# Patient Record
Sex: FEMALE | Race: WHITE | NOT HISPANIC OR LATINO | Employment: UNEMPLOYED | URBAN - METROPOLITAN AREA
[De-identification: names, ages, dates, MRNs, and addresses within clinical notes are randomized per-mention and may not be internally consistent; named-entity substitution may affect disease eponyms.]

---

## 2017-06-19 ENCOUNTER — TRANSCRIBE ORDERS (OUTPATIENT)
Dept: ADMINISTRATIVE | Facility: HOSPITAL | Age: 52
End: 2017-06-19

## 2017-06-19 DIAGNOSIS — G45.9 TIA DUE TO EMBOLISM (HCC): Primary | ICD-10-CM

## 2017-06-19 DIAGNOSIS — I74.9 TIA DUE TO EMBOLISM (HCC): Primary | ICD-10-CM

## 2017-06-21 ENCOUNTER — HOSPITAL ENCOUNTER (OUTPATIENT)
Dept: RADIOLOGY | Facility: MEDICAL CENTER | Age: 52
Discharge: HOME | End: 2017-06-21
Payer: COMMERCIAL

## 2017-06-21 DIAGNOSIS — I74.9 TIA DUE TO EMBOLISM (HCC): ICD-10-CM

## 2017-06-21 DIAGNOSIS — G45.9 TIA DUE TO EMBOLISM (HCC): ICD-10-CM

## 2017-06-21 PROCEDURE — 93880 EXTRACRANIAL BILAT STUDY: CPT

## 2017-07-24 ENCOUNTER — ALLSCRIPTS OFFICE VISIT (OUTPATIENT)
Dept: OTHER | Facility: OTHER | Age: 52
End: 2017-07-24

## 2018-01-15 VITALS — HEART RATE: 67 BPM | DIASTOLIC BLOOD PRESSURE: 80 MMHG | RESPIRATION RATE: 18 BRPM | SYSTOLIC BLOOD PRESSURE: 130 MMHG

## 2018-01-24 DIAGNOSIS — I65.23 OCCLUSION AND STENOSIS OF BILATERAL CAROTID ARTERIES: ICD-10-CM

## 2018-08-21 ENCOUNTER — HOSPITAL ENCOUNTER (OUTPATIENT)
Dept: RADIOLOGY | Facility: MEDICAL CENTER | Age: 53
Discharge: HOME | End: 2018-08-21
Payer: COMMERCIAL

## 2018-08-21 DIAGNOSIS — I65.23 OCCLUSION AND STENOSIS OF BILATERAL CAROTID ARTERIES: ICD-10-CM

## 2018-08-21 PROCEDURE — 93880 EXTRACRANIAL BILAT STUDY: CPT | Performed by: SURGERY

## 2018-08-21 PROCEDURE — 93880 EXTRACRANIAL BILAT STUDY: CPT

## 2018-11-04 ENCOUNTER — OFFICE VISIT (OUTPATIENT)
Dept: URGENT CARE | Facility: MEDICAL CENTER | Age: 53
End: 2018-11-04
Payer: COMMERCIAL

## 2018-11-04 VITALS
HEART RATE: 84 BPM | SYSTOLIC BLOOD PRESSURE: 176 MMHG | HEIGHT: 63 IN | OXYGEN SATURATION: 97 % | BODY MASS INDEX: 34.12 KG/M2 | DIASTOLIC BLOOD PRESSURE: 118 MMHG | WEIGHT: 192.6 LBS | RESPIRATION RATE: 18 BRPM | TEMPERATURE: 98 F

## 2018-11-04 DIAGNOSIS — S05.01XA ABRASION OF RIGHT CORNEA, INITIAL ENCOUNTER: Primary | ICD-10-CM

## 2018-11-04 PROCEDURE — 99213 OFFICE O/P EST LOW 20 MIN: CPT | Performed by: PHYSICIAN ASSISTANT

## 2018-11-04 RX ORDER — KETOROLAC TROMETHAMINE 5 MG/ML
1 SOLUTION OPHTHALMIC 4 TIMES DAILY
Qty: 5 ML | Refills: 0 | Status: SHIPPED | OUTPATIENT
Start: 2018-11-04 | End: 2019-10-23 | Stop reason: ALTCHOICE

## 2018-11-04 RX ORDER — HYDROCHLOROTHIAZIDE 25 MG/1
25 TABLET ORAL DAILY
COMMUNITY

## 2018-11-04 RX ORDER — OFLOXACIN 3 MG/ML
SOLUTION/ DROPS OPHTHALMIC
Qty: 5 ML | Refills: 0 | Status: SHIPPED | OUTPATIENT
Start: 2018-11-04 | End: 2019-10-23 | Stop reason: ALTCHOICE

## 2018-11-04 RX ORDER — IRBESARTAN 150 MG/1
150 TABLET ORAL EVERY OTHER DAY
COMMUNITY

## 2018-11-04 RX ORDER — LEVOTHYROXINE AND LIOTHYRONINE 76; 18 UG/1; UG/1
120 TABLET ORAL EVERY OTHER DAY
COMMUNITY
End: 2019-11-05

## 2018-11-04 NOTE — PATIENT INSTRUCTIONS
Corneal Ulcer   WHAT YOU NEED TO KNOW:   What is a corneal ulcer? A corneal ulcer is an open sore on your cornea  The cornea is the smooth, clear outer layer of your eye  A corneal ulcer is caused by bacteria that get into your eye, such as through a scratch  What increases my risk for a corneal ulcer? · Dry eyes    · Eye injury from an accident, contact lenses, or a chemical splash    · Medical conditions, such as diabetes or rheumatoid arthritis    · Incorrect use of eye medicines    · Swollen eyelids    · Recent eye surgery  What are the signs and symptoms of a corneal ulcer? The most common symptom is eye pain  You may also have the following:  · A feeling that you have something in your eye    · Round, white spots or gray haze on your eye    · Red, swollen, and watery eyes    · Red skin around your eye    · Blurred or decreased vision    · Sensitivity to bright light  How is a corneal ulcer diagnosed? Your healthcare provider will examine your eye and ask about your symptoms  You may need any of the following:  · Slit-lamp test:  A microscope is used to look into your eye and check for injury  Your healthcare provider may use dye to see your injury better  · Contact lens culture: Your healthcare provider may take a sample of your contact lens to check for bacteria  How is a corneal ulcer treated? · NSAIDs:  These medicines decrease swelling, pain, and fever  NSAIDs are available without a doctor's order  Ask your healthcare provider which medicine is right for you  Ask how much to take and when to take it  Take as directed  NSAIDs can cause stomach bleeding and kidney problems if not taken correctly  · Antibiotic eye medicine: This is given to treat an infection caused by bacteria  It may be in eyedrops or an ointment  · Cycloplegic eye medicine: This will dilate your pupil and relax your eye muscles, which will decrease your pain  · Pain medicines:   You may be given prescription medicine to take away or decrease pain  Do not wait until the pain is severe before you take your medicine  What are the risks of a corneal ulcer? Your condition may return or worsen after treatment  The bacteria may spread deeper into your eye and cause tissue damage or scarring  Without treatment, you could lose your vision  How can I manage my symptoms? · Apply a warm compress:  Wet a washcloth with warm water and place it on your eye  This will help decrease swelling and pain  Use as often as directed  · Clean around your eye:  Gently remove any crusty buildup around your eye  · Use eyedrops: This will keep your eyes moist and help decrease pain  · Use safety equipment:  Wear sunglasses or safety goggles to avoid another injury  · Ask about your contacts:  Do not wear contact lenses until your healthcare provider says it is okay  Always clean your contact lenses with proper contact   When should I contact my healthcare provider? · Your vision gets worse  · Your symptoms do not improve with treatment  · You have questions or concerns about your condition or care  When should I seek immediate care or call 911? · You have severe eye pain  · You lose your vision  · You think your corneal ulcer is getting bigger  · You injure your eye again  CARE AGREEMENT:   You have the right to help plan your care  Learn about your health condition and how it may be treated  Discuss treatment options with your caregivers to decide what care you want to receive  You always have the right to refuse treatment  The above information is an  only  It is not intended as medical advice for individual conditions or treatments  Talk to your doctor, nurse or pharmacist before following any medical regimen to see if it is safe and effective for you    © 2017 [employer] Information is for End User's use only and may not be sold, redistributed or otherwise used for commercial purposes  All illustrations and images included in CareNotes® are the copyrighted property of A D A M , Inc  or Alfie Flores

## 2018-11-04 NOTE — PROGRESS NOTES
[facility]      NAME: Keaton Diamond is a 48 y o  female  : 1965    MRN: [id number]  DATE: 2018  TIME: 4:10 PM    Assessment and Plan   Abrasion of right cornea, initial encounter [S05 01XA]  1  Abrasion of right cornea, initial encounter  ofloxacin (OCUFLOX) 0 3 % ophthalmic solution    ketorolac (ACULAR) 0 5 % ophthalmic solution       Patient Instructions      I am concerned for possible corneal ulceration, due to the size the region gave patient OFloxin and Acular  Patient will follow up Ophthalmology tomorrow morning she will call for appointment  Advised if symptoms worsen, she is unable to get ophthalmology to go to the emergency department without delay  Chief Complaint     Chief Complaint   Patient presents with    Eye Pain         History of Present Illness   Keaton Diamond presents to the clinic c/o      35-year-old female, presents for evaluation of right eye pain, redness started today when she 1st toe  Patient states she has been having clear drainage coming from that eye as well she states that she feels is more irritation compared to pain  She denies any photophobia  She denies any new visual disturbances or visual loss  Denies any pain with extraocular movements  She does not wear contact lenses  Review of Systems   Review of Systems   Constitutional: Negative for fever  Eyes: Positive for pain and redness  Negative for photophobia and itching           Current Medications     Long-Term Prescriptions   Medication Sig Dispense Refill    aspirin 81 MG tablet Take 1 tablet by mouth daily      hydrochlorothiazide (HYDRODIURIL) 25 mg tablet Take 25 mg by mouth daily      irbesartan (AVAPRO) 150 mg tablet Take 150 mg by mouth daily at bedtime      ketorolac (ACULAR) 0 5 % ophthalmic solution Administer 1 drop to the right eye 4 (four) times a day 5 mL 0    thyroid (ARMOUR THYROID) 120 MG tablet Take by mouth         Current Allergies     Allergies as of 11/04/2018    (No Known Allergies)            The following portions of the patient's history were reviewed and updated as appropriate: allergies, current medications, past family history, past medical history, past social history, past surgical history and problem list     HISTORICAL INFO:  Past Medical History:   Diagnosis Date    Hypertension      No past surgical history on file  Objective   BP (!) 176/118   Pulse 84   Temp 98 °F (36 7 °C) (Temporal)   Resp 18   Ht 5' 3" (1 6 m)   Wt 87 4 kg (192 lb 9 6 oz)   SpO2 97%   BMI 34 12 kg/m²        Physical Exam     Physical Exam   Constitutional: She appears well-developed and well-nourished  No distress  HENT:   Head: Normocephalic and atraumatic  Eyes: Pupils are equal, round, and reactive to light  EOM are normal  Right conjunctiva is injected  Left conjunctiva is not injected  Fluorescein stain, with proparacaine drops revealed a triangular shaped abrasion at approximately 7 o'clock on cornea  due to the size, concerning for corneal ulcer  Cardiovascular: Normal rate, regular rhythm and normal heart sounds  Pulmonary/Chest: Effort normal and breath sounds normal  No respiratory distress  She has no wheezes  She has no rales  Skin: Skin is warm and dry  She is not diaphoretic  Nursing note and vitals reviewed  M*Modal software was used to dictate this note  It may contain errors with dictating incorrect words/spelling  Please contact provider directly for any questions

## 2019-02-26 DIAGNOSIS — I65.23 BILATERAL CAROTID ARTERY STENOSIS: Primary | ICD-10-CM

## 2019-03-22 ENCOUNTER — TELEPHONE (OUTPATIENT)
Dept: ADMINISTRATIVE | Facility: HOSPITAL | Age: 54
End: 2019-03-22

## 2019-04-01 ENCOUNTER — HOSPITAL ENCOUNTER (OUTPATIENT)
Dept: NON INVASIVE DIAGNOSTICS | Facility: CLINIC | Age: 54
Discharge: HOME | End: 2019-04-01
Payer: COMMERCIAL

## 2019-04-01 DIAGNOSIS — I65.23 BILATERAL CAROTID ARTERY STENOSIS: ICD-10-CM

## 2019-04-01 PROCEDURE — 93880 EXTRACRANIAL BILAT STUDY: CPT | Performed by: SURGERY

## 2019-04-01 PROCEDURE — 93880 EXTRACRANIAL BILAT STUDY: CPT

## 2019-10-23 ENCOUNTER — OFFICE VISIT (OUTPATIENT)
Dept: FAMILY MEDICINE CLINIC | Facility: MEDICAL CENTER | Age: 54
End: 2019-10-23
Payer: COMMERCIAL

## 2019-10-23 VITALS
RESPIRATION RATE: 16 BRPM | DIASTOLIC BLOOD PRESSURE: 60 MMHG | SYSTOLIC BLOOD PRESSURE: 108 MMHG | WEIGHT: 193 LBS | HEART RATE: 78 BPM | BODY MASS INDEX: 34.2 KG/M2 | HEIGHT: 63 IN

## 2019-10-23 DIAGNOSIS — Z12.39 SCREENING FOR BREAST CANCER: ICD-10-CM

## 2019-10-23 DIAGNOSIS — Z12.11 SCREENING FOR COLON CANCER: ICD-10-CM

## 2019-10-23 DIAGNOSIS — E78.5 HYPERLIPIDEMIA, UNSPECIFIED HYPERLIPIDEMIA TYPE: ICD-10-CM

## 2019-10-23 DIAGNOSIS — I10 ESSENTIAL HYPERTENSION: Primary | ICD-10-CM

## 2019-10-23 DIAGNOSIS — Z12.4 SCREENING FOR CERVICAL CANCER: ICD-10-CM

## 2019-10-23 DIAGNOSIS — Z13.1 SCREENING FOR DIABETES MELLITUS: ICD-10-CM

## 2019-10-23 DIAGNOSIS — Z11.59 NEED FOR HEPATITIS C SCREENING TEST: ICD-10-CM

## 2019-10-23 DIAGNOSIS — E83.52 HYPERCALCEMIA: ICD-10-CM

## 2019-10-23 DIAGNOSIS — E03.9 HYPOTHYROIDISM, UNSPECIFIED TYPE: ICD-10-CM

## 2019-10-23 PROBLEM — I65.23 CAROTID STENOSIS, ASYMPTOMATIC, BILATERAL: Status: ACTIVE | Noted: 2017-07-24

## 2019-10-23 PROCEDURE — 3074F SYST BP LT 130 MM HG: CPT | Performed by: FAMILY MEDICINE

## 2019-10-23 PROCEDURE — 99204 OFFICE O/P NEW MOD 45 MIN: CPT | Performed by: FAMILY MEDICINE

## 2019-10-23 PROCEDURE — 3078F DIAST BP <80 MM HG: CPT | Performed by: FAMILY MEDICINE

## 2019-10-23 RX ORDER — ASCORBIC ACID 100 MG
TABLET,CHEWABLE ORAL
COMMUNITY
End: 2020-01-30 | Stop reason: CLARIF

## 2019-10-23 RX ORDER — LEVOTHYROXINE AND LIOTHYRONINE 57; 13.5 UG/1; UG/1
90 TABLET ORAL EVERY OTHER DAY
COMMUNITY
End: 2019-11-06 | Stop reason: SDUPTHER

## 2019-10-23 RX ORDER — IRBESARTAN 300 MG/1
300 TABLET ORAL EVERY OTHER DAY
COMMUNITY
Start: 2019-08-24 | End: 2020-09-18 | Stop reason: SDUPTHER

## 2019-10-23 NOTE — PROGRESS NOTES
Assessment/Plan:    No problem-specific Assessment & Plan notes found for this encounter  Diagnoses and all orders for this visit:    Essential hypertension  -     Comprehensive metabolic panel; Future  -     Microalbumin / creatinine urine ratio  Blood pressure well controlled in the office today  Review of previous labs show stable renal function  Recheck labs to assess stability of renal function  Continue alternating doses of Avapro  Hypothyroidism, unspecified type  -     T4, free; Future  -     TSH, 3rd generation; Future  -     Thyroid Antibodies Panel; Future  Previous labs shows low TSH  I am going to repeat thyroid labs and adjust dose of Armor Thyroid if needed  For now continue alternating doses of 120 mg and 90 mg  Hyperlipidemia, unspecified hyperlipidemia type  -     Comprehensive metabolic panel; Future  -     Lipid Panel with Direct LDL reflex; Future  Elevated on previous labs  Recheck labs to assess current levels  May need to start medication based on current lipid levels and risk score  Will follow up results and go from there  Hypercalcemia  -     Vitamin D 25 hydroxy; Future  -     PTH, intact; Future  Review of previous labs also shows hypercalcemia  Check a vitamin-D level and PTH level  Screening for cervical cancer  -     Ambulatory referral to Gynecology; Future  Referral to gynecology for cervical cancer screening  Screening for colon cancer  -     Ambulatory referral to Gastroenterology; Future  Risks and benefits of colonoscopy discussed for colon cancer screening  Pt agreeable to getting a colonoscopy and referral for gastroenterology placed  Pt instructed to call the number on the referral to schedule an appointment for a consultation with the gastroenterology office before getting the colonoscopy  Screening for breast cancer  -     Mammo screening bilateral w 3d & cad; Future  Risks and benefits of mammography discussed to screen for breast cancer  Pt agreeable to the test and order for mammogram placed  Pt instructed to call the number on the order to schedule the appointment for the test     Need for hepatitis C screening test  -     Hepatitis C antibody; Future  Risks and benefits of Hep C testing discussed to screen for Hep C infection  Pt agreeable to the test and order for Hep C ab placed  Screening for diabetes mellitus  -     Hemoglobin A1C; Future  Check A1c to screen for diabetes  Other orders  -     B Complex Vitamins (B COMPLEX 50 PO); B Complex  -     Multiple Vitamins-Minerals (MULTIVITAMIN ADULT EXTRA C PO); multivitamin  -     Ascorbic Acid (VITAMIN C) 100 MG CHEW; Vitamin C  -     irbesartan (AVAPRO) 300 mg tablet; Take 300 mg by mouth every other day Alternating with 150mg dose  -     thyroid (ARMOUR THYROID) 90 MG tablet; Take 90 mg by mouth every other day Alternating with 120mg dose      Age appropriate vaccinations highly encouraged but patient declines any vaccines at this time  BMI Counseling: Body mass index is 34 19 kg/m²  The BMI is above normal  Nutrition recommendations include decreasing overall calorie intake  Exercise recommendations include moderate aerobic physical activity for 150 minutes/week  Follow-up in six months or sooner if needed  Subjective:      Patient ID: Ele Al is a 47 y o  female  Patient presents to establish care  She has hypertension  She is currently on Avapro 300 mg and 150 mg  Alternates dosing  This helps to keep her blood pressure controlled and not too high and not too low  Tolerating medication well without any lightheadedness, dizziness or cough  She has hypothyroidism  She is on Armor thyroid 120 mg and 90 mg  Alternates dosing  Believes she is due for thyroid labs  No symptoms of hypothyroidism such as weight gain, hair loss or fatigue  She has hyperlipidemia  She is not on any medication at this time    She has been on statins in the past but does not tolerate the medication as she states it raises her CK level  Currently controls her cholesterol via lifestyle modification with healthy diet and exercise  Has not seen the gynecologist in many years  Has not had a colonoscopy  Has not had a mammogram in a few years  Patient did bring in labs for review from April 2019  The following portions of the patient's history were reviewed and updated as appropriate:   She  has a past medical history of Hypertension and Hypothyroid  She   Patient Active Problem List    Diagnosis Date Noted    HTN (hypertension) 10/23/2019    Hypothyroid 10/23/2019    Hyperlipidemia 10/23/2019    Carotid stenosis, asymptomatic, bilateral 07/24/2017     She  has a past surgical history that includes Hip surgery (Right) and Breast surgery  Her family history includes Heart disease in her mother; Lung cancer in her father; Other in her mother  She  reports that she has quit smoking  She has never used smokeless tobacco  She reports that she drinks alcohol  She reports that she does not use drugs  Current Outpatient Medications   Medication Sig Dispense Refill    Ascorbic Acid (VITAMIN C) 100 MG CHEW Vitamin C      aspirin 81 MG tablet Take 1 tablet by mouth daily      B Complex Vitamins (B COMPLEX 50 PO) B Complex      hydrochlorothiazide (HYDRODIURIL) 25 mg tablet Take 25 mg by mouth daily      irbesartan (AVAPRO) 150 mg tablet Take 150 mg by mouth every other day Alternating with 300mg dose      irbesartan (AVAPRO) 300 mg tablet Take 300 mg by mouth every other day Alternating with 150mg dose      Multiple Vitamins-Minerals (MULTIVITAMIN ADULT EXTRA C PO) multivitamin      thyroid (ARMOUR THYROID) 120 MG tablet Take 120 mg by mouth every other day Alternating with 90mg dose       thyroid (ARMOUR THYROID) 90 MG tablet Take 90 mg by mouth every other day Alternating with 120mg dose       No current facility-administered medications for this visit  Current Outpatient Medications on File Prior to Visit   Medication Sig    Ascorbic Acid (VITAMIN C) 100 MG CHEW Vitamin C    aspirin 81 MG tablet Take 1 tablet by mouth daily    B Complex Vitamins (B COMPLEX 50 PO) B Complex    hydrochlorothiazide (HYDRODIURIL) 25 mg tablet Take 25 mg by mouth daily    irbesartan (AVAPRO) 150 mg tablet Take 150 mg by mouth every other day Alternating with 300mg dose    irbesartan (AVAPRO) 300 mg tablet Take 300 mg by mouth every other day Alternating with 150mg dose    Multiple Vitamins-Minerals (MULTIVITAMIN ADULT EXTRA C PO) multivitamin    thyroid (ARMOUR THYROID) 120 MG tablet Take 120 mg by mouth every other day Alternating with 90mg dose     thyroid (ARMOUR THYROID) 90 MG tablet Take 90 mg by mouth every other day Alternating with 120mg dose    [DISCONTINUED] ketorolac (ACULAR) 0 5 % ophthalmic solution Administer 1 drop to the right eye 4 (four) times a day (Patient not taking: Reported on 10/23/2019)    [DISCONTINUED] ofloxacin (OCUFLOX) 0 3 % ophthalmic solution Apply 1 drop every 1-2 hours on day 1-3 while awake  Day 4-7 every 4 hours  (Patient not taking: Reported on 10/23/2019)     No current facility-administered medications on file prior to visit  She is allergic to latex       Review of Systems   Constitutional: Negative for fever  HENT: Negative for ear pain, rhinorrhea and sore throat  Eyes: Negative for visual disturbance  Respiratory: Negative for shortness of breath  Cardiovascular: Negative for chest pain  Gastrointestinal: Negative for abdominal pain and blood in stool  Genitourinary: Negative for dysuria and hematuria  Musculoskeletal: Negative for arthralgias and myalgias  Skin: Negative for rash  Neurological: Negative for headaches  Psychiatric/Behavioral: Negative for dysphoric mood  The patient is not nervous/anxious            Objective:      /60 (BP Location: Left arm, Patient Position: Sitting, Cuff Size: Adult)   Pulse 78   Resp 16   Ht 5' 3" (1 6 m)   Wt 87 5 kg (193 lb)   BMI 34 19 kg/m²          Physical Exam   Constitutional: She is oriented to person, place, and time  Vital signs are normal  She appears well-developed and well-nourished  HENT:   Head: Normocephalic and atraumatic  Right Ear: Tympanic membrane, external ear and ear canal normal    Left Ear: Tympanic membrane, external ear and ear canal normal    Nose: Nose normal    Mouth/Throat: Uvula is midline, oropharynx is clear and moist and mucous membranes are normal    Eyes: Pupils are equal, round, and reactive to light  Conjunctivae, EOM and lids are normal    Neck: Trachea normal  Neck supple  No thyromegaly present  Cardiovascular: Normal rate, regular rhythm, S1 normal and S2 normal    No murmur heard  Pulmonary/Chest: Effort normal and breath sounds normal    Abdominal: Soft  Bowel sounds are normal  There is no tenderness  Lymphadenopathy:     She has no cervical adenopathy  Neurological: She is alert and oriented to person, place, and time  No cranial nerve deficit  Skin: Skin is warm, dry and intact  Psychiatric: She has a normal mood and affect   Her speech is normal and behavior is normal  Thought content normal

## 2019-10-25 ENCOUNTER — OFFICE VISIT (OUTPATIENT)
Dept: OBGYN CLINIC | Facility: MEDICAL CENTER | Age: 54
End: 2019-10-25
Payer: COMMERCIAL

## 2019-10-25 VITALS
BODY MASS INDEX: 32.6 KG/M2 | WEIGHT: 184 LBS | HEIGHT: 63 IN | DIASTOLIC BLOOD PRESSURE: 56 MMHG | SYSTOLIC BLOOD PRESSURE: 110 MMHG

## 2019-10-25 DIAGNOSIS — N90.89 DISCOLORATION OF VULVA: ICD-10-CM

## 2019-10-25 DIAGNOSIS — Z01.419 ENCOUNTER FOR GYNECOLOGICAL EXAMINATION WITHOUT ABNORMAL FINDING: ICD-10-CM

## 2019-10-25 DIAGNOSIS — Z12.39 ENCOUNTER FOR SCREENING FOR MALIGNANT NEOPLASM OF BREAST: Primary | ICD-10-CM

## 2019-10-25 DIAGNOSIS — Z12.4 SCREENING FOR CERVICAL CANCER: ICD-10-CM

## 2019-10-25 PROCEDURE — G0143 SCR C/V CYTO,THINLAYER,RESCR: HCPCS | Performed by: STUDENT IN AN ORGANIZED HEALTH CARE EDUCATION/TRAINING PROGRAM

## 2019-10-25 PROCEDURE — S0610 ANNUAL GYNECOLOGICAL EXAMINA: HCPCS | Performed by: STUDENT IN AN ORGANIZED HEALTH CARE EDUCATION/TRAINING PROGRAM

## 2019-10-25 PROCEDURE — 87624 HPV HI-RISK TYP POOLED RSLT: CPT | Performed by: STUDENT IN AN ORGANIZED HEALTH CARE EDUCATION/TRAINING PROGRAM

## 2019-10-25 PROCEDURE — 88305 TISSUE EXAM BY PATHOLOGIST: CPT | Performed by: PATHOLOGY

## 2019-10-25 PROCEDURE — 56605 BIOPSY OF VULVA/PERINEUM: CPT | Performed by: STUDENT IN AN ORGANIZED HEALTH CARE EDUCATION/TRAINING PROGRAM

## 2019-10-25 NOTE — PROGRESS NOTES
Assessment/Plan:    No problem-specific Assessment & Plan notes found for this encounter  {Assess/PlanSmartLinks:40402}      Subjective:      Patient ID: Leta Turner is a 47 y o  female      HPI    {Common ambulatory SmartLinks:23897}    Review of Systems      Objective:      /56 (BP Location: Right arm, Patient Position: Sitting, Cuff Size: Standard)   Ht 5' 3" (1 6 m)   Wt 83 5 kg (184 lb)   BMI 32 59 kg/m²          Physical Exam

## 2019-10-25 NOTE — PROGRESS NOTES
Assessment/Plan:    46 yo F here for annual exam     Follow up results of pap smear and vulvar biopsy  Pt given post procedure bleeding precautions  Instructed to place pressure on area  Subjective:      Patient ID: Matt Landis is a 47 y o  female  46 yo G0 postmenopausal female presents for annual exam  She has no complaints  She has not been to a gyn in several years  Denies postmenopausal bleeding or discharge  No pelvic pain  Did have hot flashes and night sweats years ago but these have stopped  Pt and her  have not been sexually active for years  Pt does have sexual desire and is unsure why they have not been sexually active  Denies issues with their marriage -reports her  is her best friend and they are very happy  Last pap smear: unknown  Denies any abnormal pap smear  Mammo: ordered for next week  Colonoscopy: never  Given referral from PCP  Pt and her  were  on Halloween in full costumes  The following portions of the patient's history were reviewed and updated as appropriate: allergies, current medications, past family history, past medical history, past social history, past surgical history and problem list     Review of Systems   Constitutional: Negative  HENT: Negative  Eyes: Negative  Respiratory: Negative  Cardiovascular: Negative  Gastrointestinal: Negative  Endocrine: Negative  Genitourinary: Negative for dyspareunia, dysuria, frequency, menstrual problem, pelvic pain, vaginal discharge and vaginal pain  Musculoskeletal: Negative  Skin: Negative  Allergic/Immunologic: Negative  Neurological: Negative  Hematological: Negative  Psychiatric/Behavioral: Negative  Objective:      /56 (BP Location: Right arm, Patient Position: Sitting, Cuff Size: Standard)   Ht 5' 3" (1 6 m)   Wt 83 5 kg (184 lb)   BMI 32 59 kg/m²          Physical Exam   Constitutional: She is oriented to person, place, and time  She appears well-nourished  Neck: Normal range of motion  Cardiovascular: Normal rate  Pulmonary/Chest: Effort normal  Right breast exhibits no mass, no nipple discharge, no skin change and no tenderness  Left breast exhibits no mass, no nipple discharge, no skin change and no tenderness  Breasts are symmetrical    Abdominal: Soft  Genitourinary: Vagina normal and uterus normal  There is lesion on the left labia  Uterus is not enlarged and not fixed  Right adnexum displays no mass  Left adnexum displays no mass  No signs of injury around the vagina  Genitourinary Comments: 2 cm light brown discoloration of labia minora  Per pt it has not been pruritic or painful  Neurological: She is alert and oriented to person, place, and time  Skin: Skin is warm and dry  Psychiatric: She has a normal mood and affect  Vitals reviewed  Biopsy  Date/Time: 10/25/2019 3:10 PM  Performed by: Srini Barry MD  Authorized by: Srini Barry MD     Procedure Details - Skin Biopsy:     Biopsy tissue type: mucous membrane    Biopsy method: punch biopsy      Initial size (mm):  6     Area cleansed with betadine  Injected local anesthetic  6 mm punch biopsy taken  Amputated with scissors  Pressure placed  Silver nitrate applied and hemostasis noted

## 2019-10-28 DIAGNOSIS — I65.23 CAROTID STENOSIS, BILATERAL: Primary | ICD-10-CM

## 2019-10-28 LAB
HPV HR 12 DNA CVX QL NAA+PROBE: NEGATIVE
HPV16 DNA CVX QL NAA+PROBE: NEGATIVE
HPV18 DNA CVX QL NAA+PROBE: NEGATIVE

## 2019-10-29 ENCOUNTER — TELEPHONE (OUTPATIENT)
Dept: ADMINISTRATIVE | Facility: HOSPITAL | Age: 54
End: 2019-10-29

## 2019-10-29 LAB
CREAT ?TM UR-SCNC: 141 UMOL/L
EXT MICROALBUMIN URINE RANDOM: 1
HBA1C MFR BLD HPLC: 5.8 %
HCV AB SER-ACNC: NEGATIVE
MICROALBUMIN/CREAT UR: 7.1 MG/G{CREAT}

## 2019-10-31 LAB
LAB AP GYN PRIMARY INTERPRETATION: NORMAL
Lab: NORMAL

## 2019-11-04 ENCOUNTER — TELEPHONE (OUTPATIENT)
Dept: GASTROENTEROLOGY | Facility: AMBULARY SURGERY CENTER | Age: 54
End: 2019-11-04

## 2019-11-04 ENCOUNTER — TELEPHONE (OUTPATIENT)
Dept: OBGYN CLINIC | Facility: CLINIC | Age: 54
End: 2019-11-04

## 2019-11-04 NOTE — TELEPHONE ENCOUNTER
Lm for pt to call back re: scheduling screening colonoscopy w/ dr Isaias Davila at [facility] per referral

## 2019-11-04 NOTE — TELEPHONE ENCOUNTER
----- Message from Christoph Bean MD sent at 11/4/2019 10:49 AM EST -----  Discussed results with pt  Biopsy and pap WNL  Can you please give her a call and set up an annual for October 2020? She is expecting your call  Thanks!

## 2019-11-05 ENCOUNTER — HOSPITAL ENCOUNTER (OUTPATIENT)
Dept: RADIOLOGY | Facility: MEDICAL CENTER | Age: 54
Discharge: HOME | End: 2019-11-05
Payer: COMMERCIAL

## 2019-11-05 ENCOUNTER — TELEPHONE (OUTPATIENT)
Dept: FAMILY MEDICINE CLINIC | Facility: MEDICAL CENTER | Age: 54
End: 2019-11-05

## 2019-11-05 VITALS — WEIGHT: 184 LBS | HEIGHT: 63 IN | BODY MASS INDEX: 32.6 KG/M2

## 2019-11-05 DIAGNOSIS — E03.9 HYPOTHYROIDISM, UNSPECIFIED TYPE: Primary | ICD-10-CM

## 2019-11-05 DIAGNOSIS — E55.9 VITAMIN D DEFICIENCY: ICD-10-CM

## 2019-11-05 DIAGNOSIS — Z12.39 SCREENING FOR BREAST CANCER: ICD-10-CM

## 2019-11-05 PROCEDURE — 77067 SCR MAMMO BI INCL CAD: CPT

## 2019-11-05 PROCEDURE — 77063 BREAST TOMOSYNTHESIS BI: CPT

## 2019-11-05 NOTE — TELEPHONE ENCOUNTER
Aware- needs [facility] as the Vitamin D rx/ will  orders for blood work this afternoon

## 2019-11-05 NOTE — TELEPHONE ENCOUNTER
----- Message from Gayla Blair DO sent at 11/5/2019 12:32 PM EST -----  Labs reviewed  Patient is getting too much thyroid medication has her TSH is very low  She is currently on Armor Thyroid 120 mg and 90 mg which she alternates  She is to discontinue the 120 mg tablets and stick with the 90 mg tablets  Repeat thyroid labs in six weeks  Order for that has been placed  Vitamin-D is low likely causing hypercalcemia  I recommend vitamin-D 85648 units weekly for at least 3-6 months  If patient is agreeable I will fax in the medication  We will then repeat vitamin-D labs at a later date  Remainder of labs unremarkable

## 2019-11-06 PROBLEM — E55.9 VITAMIN D DEFICIENCY: Status: ACTIVE | Noted: 2019-11-06

## 2019-11-06 RX ORDER — LEVOTHYROXINE AND LIOTHYRONINE 57; 13.5 UG/1; UG/1
90 TABLET ORAL DAILY
Qty: 90 TABLET | Refills: 0 | Status: SHIPPED | OUTPATIENT
Start: 2019-11-06 | End: 2019-11-11

## 2019-11-06 RX ORDER — ERGOCALCIFEROL 1.25 MG/1
50000 CAPSULE ORAL WEEKLY
Qty: 12 CAPSULE | Refills: 1 | Status: SHIPPED | OUTPATIENT
Start: 2019-11-06 | End: 2020-01-30 | Stop reason: ALTCHOICE

## 2019-11-06 NOTE — TELEPHONE ENCOUNTER
Vitamin-D and Wander Thyroid both [facility]ed [facility]  Thyroid labs ordered yesterday  That will be for six weeks from yesterday  Vitamin-D labs ordered which will be due in six months

## 2019-11-07 ENCOUNTER — TELEPHONE (OUTPATIENT)
Dept: FAMILY MEDICINE CLINIC | Facility: MEDICAL CENTER | Age: 54
End: 2019-11-07

## 2019-11-07 NOTE — TELEPHONE ENCOUNTER
----- Message from Emerita Lerner DO sent at 11/7/2019  1:02 PM EST -----  Mammogram negative  Repeat in one year

## 2019-11-11 ENCOUNTER — TELEPHONE (OUTPATIENT)
Dept: FAMILY MEDICINE CLINIC | Facility: MEDICAL CENTER | Age: 54
End: 2019-11-11

## 2019-11-11 DIAGNOSIS — E03.9 HYPOTHYROIDISM, UNSPECIFIED TYPE: ICD-10-CM

## 2019-11-11 RX ORDER — LEVOTHYROXINE AND LIOTHYRONINE 57; 13.5 UG/1; UG/1
90 TABLET ORAL DAILY
Qty: 90 TABLET | Refills: 0 | Status: SHIPPED | OUTPATIENT
Start: 2019-11-11 | End: 2019-11-12 | Stop reason: SDUPTHER

## 2019-11-11 NOTE — TELEPHONE ENCOUNTER
Pt is confused by her thyroid med dose  She thought she was to just be taking 90 mg, but when she picked up the rx it has her alternating with the 120 mg dose  Pt wants to confirm correct dose  I did see in the 11/5 phone msg she was to discontinue the 120 mg, but her 90 mg rx says differently    I wanted to check with you first

## 2019-11-11 NOTE — TELEPHONE ENCOUNTER
Aware, she received RX from her mail order for incorrect amount  New RX requested to go to correct pharmacy

## 2019-11-11 NOTE — TELEPHONE ENCOUNTER
Please resend her Switzer Thyroid RX to her mail order pharmacy for the 90 day supply of 90 mg  Daily  You sent it to the [facility], I called them and canceled that RX

## 2019-11-12 RX ORDER — LEVOTHYROXINE AND LIOTHYRONINE 57; 13.5 UG/1; UG/1
90 TABLET ORAL DAILY
Qty: 90 TABLET | Refills: 0 | Status: SHIPPED | OUTPATIENT
Start: 2019-11-12 | End: 2020-02-26

## 2019-11-13 ENCOUNTER — OFFICE VISIT (OUTPATIENT)
Dept: URGENT CARE | Facility: MEDICAL CENTER | Age: 54
End: 2019-11-13
Payer: COMMERCIAL

## 2019-11-13 VITALS
BODY MASS INDEX: 32.43 KG/M2 | HEART RATE: 89 BPM | RESPIRATION RATE: 20 BRPM | WEIGHT: 183 LBS | TEMPERATURE: 97.6 F | HEIGHT: 63 IN | OXYGEN SATURATION: 100 %

## 2019-11-13 DIAGNOSIS — M54.41 ACUTE RIGHT-SIDED LOW BACK PAIN WITH RIGHT-SIDED SCIATICA: Primary | ICD-10-CM

## 2019-11-13 PROCEDURE — 99213 OFFICE O/P EST LOW 20 MIN: CPT | Performed by: PHYSICIAN ASSISTANT

## 2019-11-13 RX ORDER — CYCLOBENZAPRINE HCL 10 MG
10 TABLET ORAL 3 TIMES DAILY PRN
Qty: 30 TABLET | Refills: 0 | Status: SHIPPED | OUTPATIENT
Start: 2019-11-13 | End: 2020-01-30 | Stop reason: ALTCHOICE

## 2019-11-13 RX ORDER — PREDNISONE 20 MG/1
40 TABLET ORAL DAILY
Qty: 10 TABLET | Refills: 0 | Status: SHIPPED | OUTPATIENT
Start: 2019-11-13 | End: 2019-11-18

## 2019-11-14 NOTE — PROGRESS NOTES
[facility]        NAME: Anais Devries is a 47 y o  female  : 1965    MRN: [id number]  DATE: 2019  TIME: 7:33 PM    Assessment and Plan   Acute right-sided low back pain with right-sided sciatica [M54 41]  1  Acute right-sided low back pain with right-sided sciatica  cyclobenzaprine (FLEXERIL) 10 mg tablet    predniSONE 20 mg tablet         Patient Instructions     May continue Tylenol   use moist heat on back   use prednisone and Flexeril as directed   follow-up with PCP if symptoms do not improve    Chief Complaint     Chief Complaint   Patient presents with    Back Pain     x3 as taken tylenol  pt states it barely works  very bad back spasms  History of Present Illness         Patient is a 51-year-old female who presents today with complaints of right-sided lower back pain that started over the past 3 days  Denies any known injury  Patient did have a right hip replacement back in May 2019, she has been in physical therapy for this  She reports her right leg was shorter than her left until her hip was replaced  She does report pain and some numbness and tingling down the right leg as well  She has tried Tylenol Motrin with no relief  Certain movements make the pain worse  She denies any fevers, abdominal pain, bowel or bladder incontinence  Review of Systems   Review of Systems   Constitutional: Negative for fever  Respiratory: Negative for shortness of breath  Cardiovascular: Negative for chest pain  Gastrointestinal: Negative for abdominal pain  Genitourinary: Negative for difficulty urinating, dysuria and hematuria  Musculoskeletal: Positive for back pain  Skin: Negative for rash  Neurological: Positive for numbness           Current Medications       Current Outpatient Medications:     Ascorbic Acid (VITAMIN C) 100 MG CHEW, Vitamin C, Disp: , Rfl:     aspirin 81 MG tablet, Take 1 tablet by mouth daily, Disp: , Rfl:     B Complex Vitamins (B COMPLEX 50 PO), B Complex, Disp: , Rfl:     ergocalciferol (VITAMIN D2) 50,000 units, Take 1 capsule (50,000 Units total) by mouth once a week, Disp: 12 capsule, Rfl: 1    hydrochlorothiazide (HYDRODIURIL) 25 mg tablet, Take 25 mg by mouth daily, Disp: , Rfl:     irbesartan (AVAPRO) 150 mg tablet, Take 150 mg by mouth every other day Alternating with 300mg dose, Disp: , Rfl:     irbesartan (AVAPRO) 300 mg tablet, Take 300 mg by mouth every other day Alternating with 150mg dose, Disp: , Rfl:     Multiple Vitamins-Minerals (MULTIVITAMIN ADULT EXTRA C PO), multivitamin, Disp: , Rfl:     thyroid (ARMOUR THYROID) 90 MG tablet, Take 1 tablet (90 mg total) by mouth daily, Disp: 90 tablet, Rfl: 0    cyclobenzaprine (FLEXERIL) 10 mg tablet, Take 1 tablet (10 mg total) by mouth 3 (three) times a day as needed for muscle spasms, Disp: 30 tablet, Rfl: 0    predniSONE 20 mg tablet, Take 2 tablets (40 mg total) by mouth daily for 5 days, Disp: 10 tablet, Rfl: 0    Current Allergies     Allergies as of 11/13/2019 - Reviewed 11/13/2019   Allergen Reaction Noted    Latex  10/23/2019            The following portions of the patient's history were reviewed and updated as appropriate: allergies, current medications, past family history, past medical history, past social history, past surgical history and problem list      Past Medical History:   Diagnosis Date    Arthritis     Hyperlipidemia     Hypertension     Hypothyroid        Past Surgical History:   Procedure Laterality Date    BREAST CYST EXCISION Left 2001    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    BREAST LUMPECTOMY Left 04/2001    DCIS    BREAST SURGERY      HIP SURGERY Right     1966, 1975, 1976        Family History   Problem Relation Age of Onset    Other Mother         Carotid stenosis, bilateral     Heart disease Mother     Hypertension Mother     Lung cancer Father         smoker    Liver cancer Father    Keysha Slipper Hypertension Father     Hypertension Brother     Heart attack Maternal Grandmother     Thyroid disease Maternal Grandmother     No Known Problems Sister     No Known Problems Maternal Grandfather     No Known Problems Paternal Grandmother     No Known Problems Paternal Grandfather     No Known Problems Sister     No Known Problems Brother     No Known Problems Maternal Aunt     No Known Problems Maternal Aunt          Medications have been verified  Objective   Pulse 89   Temp 97 6 °F (36 4 °C) (Temporal)   Resp 20   Ht 5' 3" (1 6 m)   Wt 83 kg (183 lb)   SpO2 100%   BMI 32 42 kg/m²        Physical Exam     Physical Exam   Constitutional: She appears well-developed and well-nourished  No distress  HENT:   Head: Normocephalic and atraumatic  Cardiovascular: Normal rate and regular rhythm  Pulmonary/Chest: Effort normal and breath sounds normal    Abdominal: Soft  Bowel sounds are normal  She exhibits no distension  There is no tenderness  Musculoskeletal:        Lumbar back: She exhibits decreased range of motion, tenderness, pain and spasm  She exhibits no bony tenderness, no swelling, no edema, no deformity, no laceration and normal pulse  Back:    Skin: Skin is warm and dry  No rash noted

## 2019-11-14 NOTE — PATIENT INSTRUCTIONS
May continue Tylenol   use moist heat on back   use prednisone and Flexeril as directed   follow-up with PCP if symptoms do not improve    Back Pain   WHAT YOU NEED TO KNOW:   Back pain is common  It can be caused by many conditions, such as arthritis or the breakdown of spinal discs  Your risk for back pain is increased by injuries, lack of activity, or repeated bending and twisting  You may feel sore or stiff on one or both sides of your back  The pain may spread to your buttocks or thighs  DISCHARGE INSTRUCTIONS:   Medicines:   · NSAIDs  help decrease swelling and pain  This medicine is available with or without a doctor's order  NSAIDs can cause stomach bleeding or kidney problems in certain people  If you take blood thinner medicine, always ask your healthcare provider if NSAIDs are safe for you  Always read the medicine label and follow directions  · Acetaminophen  decreases pain  It is available without a doctor's order  Ask how much to take and how often to take it  Follow directions  Acetaminophen can cause liver damage if not taken correctly  · Prescription pain medicine  may be given  Ask your healthcare provider how to take this medicine safely  · Take your medicine as directed  Contact your healthcare provider if you think your medicine is not helping or if you have side effects  Tell him or her if you are allergic to any medicine  Keep a list of the medicines, vitamins, and herbs you take  Include the amounts, and when and why you take them  Bring the list or the pill bottles to follow-up visits  Carry your medicine list with you in case of an emergency  Follow up with your healthcare provider in 2 weeks, or as directed:  Write down your questions so you remember to ask them during your visits  How to manage your back pain:   · Apply ice  on your back or affected area for 15 to 20 minutes every hour or as directed  Use an ice pack, or put crushed ice in a plastic bag   Cover it with a towel  Ice helps prevent tissue damage and decreases pain  · Apply heat  on your back or affected area for 20 to 30 minutes every 2 hours for as many days as directed  Heat helps decrease pain and muscle spasms  · Stay active  as much as you can without causing more pain  Bed rest could make your back pain worse  Avoid heavy lifting until your pain is gone  Return to the emergency department if:   · You have pain, numbness, or weakness in one or both legs  · Your pain becomes so severe that you cannot walk  · You cannot control your urine or bowel movements  · You have severe back pain with chest pain  · You have severe back pain, nausea, and vomiting  · You have severe back pain that spreads to your side or genital area  Contact your healthcare provider if:   · You have back pain that does not get better with rest and pain medicine  · You have a fever  · You have pain that worsens when you are on your back or when you rest     · You have pain that worsens when you cough or sneeze  · You lose weight without trying  · You have questions or concerns about your condition or care  © 2017 [facility] Information is for End User's use only and may not be sold, redistributed or otherwise used for commercial purposes  All illustrations and images included in CareNotes® are the copyrighted property of A D [employer] M [employer]  or Alfie Flores  The above information is an  only  It is not intended as medical advice for individual conditions or treatments  Talk to your doctor, nurse or pharmacist before following any medical regimen to see if it is safe and effective for you

## 2019-11-15 ENCOUNTER — HOSPITAL ENCOUNTER (OUTPATIENT)
Dept: NON INVASIVE DIAGNOSTICS | Facility: CLINIC | Age: 54
Discharge: HOME | End: 2019-11-15
Payer: COMMERCIAL

## 2019-11-15 DIAGNOSIS — I65.23 CAROTID STENOSIS, BILATERAL: ICD-10-CM

## 2019-11-15 PROCEDURE — 93880 EXTRACRANIAL BILAT STUDY: CPT

## 2019-11-19 PROCEDURE — 93880 EXTRACRANIAL BILAT STUDY: CPT | Performed by: SURGERY

## 2020-01-02 ENCOUNTER — TELEPHONE (OUTPATIENT)
Dept: FAMILY MEDICINE CLINIC | Facility: MEDICAL CENTER | Age: 55
End: 2020-01-02

## 2020-01-02 DIAGNOSIS — E03.9 HYPOTHYROIDISM, UNSPECIFIED TYPE: Primary | ICD-10-CM

## 2020-01-02 NOTE — TELEPHONE ENCOUNTER
----- Message from Christopher Javier, DO sent at 1/2/2020  8:09 AM EST -----  Thyroid labs do show some abnormality  Continue current dose of Armor Thyroid  Repeat thyroid labs in six weeks  Will determine at that time if medication adjustment needs to be made or if endocrinology referral is needed

## 2020-01-02 NOTE — TELEPHONE ENCOUNTER
Pt aware, agreeable to labs  Please place order and we will mail to her home address as she uses [facility]

## 2020-01-15 NOTE — TELEPHONE ENCOUNTER
Patients GI provider:  Dr Cooper Payne to return call: [phone]    Reason for call: Pt calling to schedule her repeat procedure    Scheduled procedure/appointment date if applicable: Apt/procedure

## 2020-01-16 ENCOUNTER — TELEPHONE (OUTPATIENT)
Dept: GASTROENTEROLOGY | Facility: AMBULARY SURGERY CENTER | Age: 55
End: 2020-01-16

## 2020-01-16 DIAGNOSIS — Z12.11 SCREEN FOR COLON CANCER: Primary | ICD-10-CM

## 2020-01-16 NOTE — TELEPHONE ENCOUNTER
Spoke to pt   Scheduled for colonoscopy w/ dr Frannie Gamez at an gi lab on 1/30/2020/suprep ordered/instructions mailed

## 2020-01-30 ENCOUNTER — HOSPITAL ENCOUNTER (OUTPATIENT)
Dept: GASTROENTEROLOGY | Facility: HOSPITAL | Age: 55
Setting detail: OUTPATIENT SURGERY
Discharge: HOME | End: 2020-01-30
Attending: INTERNAL MEDICINE
Payer: COMMERCIAL

## 2020-01-30 ENCOUNTER — ANESTHESIA EVENT (OUTPATIENT)
Dept: GASTROENTEROLOGY | Facility: HOSPITAL | Age: 55
End: 2020-01-30

## 2020-01-30 ENCOUNTER — ANESTHESIA (OUTPATIENT)
Dept: GASTROENTEROLOGY | Facility: HOSPITAL | Age: 55
End: 2020-01-30

## 2020-01-30 VITALS
RESPIRATION RATE: 18 BRPM | WEIGHT: 182 LBS | TEMPERATURE: 96.8 F | OXYGEN SATURATION: 98 % | BODY MASS INDEX: 32.25 KG/M2 | HEIGHT: 63 IN | HEART RATE: 77 BPM | DIASTOLIC BLOOD PRESSURE: 95 MMHG | SYSTOLIC BLOOD PRESSURE: 156 MMHG

## 2020-01-30 DIAGNOSIS — Z12.11 SCREENING FOR COLON CANCER: ICD-10-CM

## 2020-01-30 PROCEDURE — G0121 COLON CA SCRN NOT HI RSK IND: HCPCS | Performed by: INTERNAL MEDICINE

## 2020-01-30 RX ORDER — PROPOFOL 10 MG/ML
INJECTION, EMULSION INTRAVENOUS AS NEEDED
Status: DISCONTINUED | OUTPATIENT
Start: 2020-01-30 | End: 2020-01-30 | Stop reason: SURG

## 2020-01-30 RX ORDER — MULTIVIT WITH MINERALS/LUTEIN
1000 TABLET ORAL DAILY
COMMUNITY

## 2020-01-30 RX ORDER — LIDOCAINE HYDROCHLORIDE 10 MG/ML
INJECTION, SOLUTION EPIDURAL; INFILTRATION; INTRACAUDAL; PERINEURAL AS NEEDED
Status: DISCONTINUED | OUTPATIENT
Start: 2020-01-30 | End: 2020-01-30 | Stop reason: SURG

## 2020-01-30 RX ORDER — SODIUM CHLORIDE, SODIUM LACTATE, POTASSIUM CHLORIDE, CALCIUM CHLORIDE 600; 310; 30; 20 MG/100ML; MG/100ML; MG/100ML; MG/100ML
INJECTION, SOLUTION INTRAVENOUS CONTINUOUS PRN
Status: DISCONTINUED | OUTPATIENT
Start: 2020-01-30 | End: 2020-01-30 | Stop reason: SURG

## 2020-01-30 RX ORDER — IBUPROFEN 200 MG
800 TABLET ORAL EVERY 6 HOURS PRN
COMMUNITY

## 2020-01-30 RX ADMIN — SODIUM CHLORIDE, SODIUM LACTATE, POTASSIUM CHLORIDE, AND CALCIUM CHLORIDE: .6; .31; .03; .02 INJECTION, SOLUTION INTRAVENOUS at 10:33

## 2020-01-30 RX ADMIN — LIDOCAINE HYDROCHLORIDE 50 MG: 10 INJECTION, SOLUTION EPIDURAL; INFILTRATION; INTRACAUDAL; PERINEURAL at 10:37

## 2020-01-30 RX ADMIN — PROPOFOL 100 MG: 10 INJECTION, EMULSION INTRAVENOUS at 10:37

## 2020-01-30 RX ADMIN — PROPOFOL 50 MG: 10 INJECTION, EMULSION INTRAVENOUS at 10:46

## 2020-01-30 RX ADMIN — PROPOFOL 50 MG: 10 INJECTION, EMULSION INTRAVENOUS at 10:40

## 2020-01-30 NOTE — ANESTHESIA PREPROCEDURE EVALUATION
Review of Systems/Medical History          Cardiovascular  Hyperlipidemia, Hypertension ,   Comment: Right carotid stenosis - no symptoms, followed by vascular surgery,  Pulmonary       GI/Hepatic            Endo/Other  History of thyroid disease , hypothyroidism,      GYN       Hematology   Musculoskeletal    Arthritis     Neurology   Psychology                Anesthesia Plan  ASA Score- 2     Anesthesia Type- IV sedation with anesthesia with ASA Monitors  Additional Monitors:   Airway Plan:     Comment: IV sedation,  standard ASA monitors  Risks and benefits discussed with patient; patient consented and agrees to proceed  I saw and evaluated the patient  If seen with CRNA, we have discussed the anesthetic plan and I am in agreement that the plan is appropriate for the patient  Post-menopausal      Plan Factors-    Induction- intravenous  Postoperative Plan-     Informed Consent- Anesthetic plan and risks discussed with patient  I personally reviewed this patient with the CRNA  Discussed and agreed on the Anesthesia Plan with the CRNA  Surya Win

## 2020-02-26 DIAGNOSIS — E03.9 HYPOTHYROIDISM, UNSPECIFIED TYPE: ICD-10-CM

## 2020-02-26 RX ORDER — THYROID,PORK 90 MG
TABLET ORAL
Qty: 30 TABLET | Refills: 0 | Status: SHIPPED | OUTPATIENT
Start: 2020-02-26

## 2020-02-28 NOTE — TELEPHONE ENCOUNTER
I called patient to let her know 30 days [place] Thyroid sent in and she needs to do repeat labs  She already did the labs in December (they are in media) , she didn't think she needed to take this medication anymore   [facility] sent to   Request for [place] Thyroid to be filled  Looks like it was filled for 30 days to [facility] which they will only fill 90 anyway  Please cancel order if not needed and call  Patient with  any other instructions

## 2020-03-05 ENCOUNTER — TELEPHONE (OUTPATIENT)
Dept: FAMILY MEDICINE CLINIC | Facility: MEDICAL CENTER | Age: 55
End: 2020-03-05

## 2020-03-05 NOTE — TELEPHONE ENCOUNTER
----- Message from Elsi Ochoa DO sent at 3/5/2020 12:48 PM EST -----  Thyroid labs remain abnormal   Is patient still taking her thyroid medication?

## 2020-03-06 NOTE — TELEPHONE ENCOUNTER
She will then need to see endocrinology as I will not continue prescribing Armor Thyroid due to lab abnormalities

## 2020-03-06 NOTE — TELEPHONE ENCOUNTER
Patient declines Levothyroxine  States she does do well with synthetic thyroid medication   Please advise

## 2020-03-06 NOTE — TELEPHONE ENCOUNTER
fyi-  Pt aware- She verbalized not happy with the recommendation   States you only made one adjustment   I explained to the patient that Family practice can manage most conditions however when the condition becomes difficult or complications arise then the PCP will refer to a specialist   She will find a specialist  I told her we would put a referral order in for her for a [facility] provider  She declined at this time    Will call back if needed

## 2020-03-06 NOTE — TELEPHONE ENCOUNTER
Due to persistent abnormalities I recommend switching to levothyroxine for treatment of her hypothyroidism

## 2020-06-30 DIAGNOSIS — E03.9 HYPOTHYROIDISM, UNSPECIFIED TYPE: ICD-10-CM

## 2020-06-30 RX ORDER — LEVOTHYROXINE AND LIOTHYRONINE 57; 13.5 UG/1; UG/1
90 TABLET ORAL DAILY
Qty: 30 TABLET | Refills: 0 | OUTPATIENT
Start: 2020-06-30

## 2020-06-30 NOTE — TELEPHONE ENCOUNTER
Pt scheduled her annual physical for 8/31/20  She is requesting Rx for her thyroid med    Please send 90 day supply to [facility]

## 2020-06-30 NOTE — TELEPHONE ENCOUNTER
Per telephone message from 3/5/2020 I did state I was no longer going to keep patient on Armor Thyroid due to abnormal thyroid labs  She will need to take levothyroxine or see endocrinology  That previous message states she was going to reestablish elsewhere  I will be more than happy to fill levothyroxine and patient can have repeat labs in six weeks

## 2020-06-30 NOTE — TELEPHONE ENCOUNTER
Pt will not take the Levothyroxine  ( a synthetic ) , unhappy that you are not willing to work with her    She will find another doctor then

## 2020-08-07 DIAGNOSIS — I65.23 CAROTID STENOSIS, BILATERAL: Primary | ICD-10-CM

## 2020-09-03 ENCOUNTER — TRANSCRIBE ORDERS (OUTPATIENT)
Dept: VASCULAR SURGERY | Facility: CLINIC | Age: 55
End: 2020-09-03

## 2020-09-03 DIAGNOSIS — I65.23 BILATERAL CAROTID ARTERY STENOSIS: Primary | ICD-10-CM

## 2020-09-09 ENCOUNTER — TRANSCRIBE ORDERS (OUTPATIENT)
Dept: ADMINISTRATIVE | Facility: HOSPITAL | Age: 55
End: 2020-09-09

## 2020-09-15 ENCOUNTER — HOSPITAL ENCOUNTER (OUTPATIENT)
Dept: NON INVASIVE DIAGNOSTICS | Facility: CLINIC | Age: 55
Discharge: HOME | End: 2020-09-15
Payer: COMMERCIAL

## 2020-09-15 DIAGNOSIS — I65.23 CAROTID STENOSIS, BILATERAL: ICD-10-CM

## 2020-09-15 PROCEDURE — 93880 EXTRACRANIAL BILAT STUDY: CPT

## 2020-09-16 PROCEDURE — 93880 EXTRACRANIAL BILAT STUDY: CPT | Performed by: SURGERY

## 2020-09-17 ENCOUNTER — TELEPHONE (OUTPATIENT)
Dept: ADMINISTRATIVE | Facility: HOSPITAL | Age: 55
End: 2020-09-17

## 2020-09-18 ENCOUNTER — OFFICE VISIT (OUTPATIENT)
Dept: VASCULAR SURGERY | Facility: CLINIC | Age: 55
End: 2020-09-18
Payer: COMMERCIAL

## 2020-09-18 VITALS
BODY MASS INDEX: 32.07 KG/M2 | WEIGHT: 181 LBS | TEMPERATURE: 98.3 F | HEART RATE: 90 BPM | HEIGHT: 63 IN | SYSTOLIC BLOOD PRESSURE: 124 MMHG | DIASTOLIC BLOOD PRESSURE: 78 MMHG

## 2020-09-18 DIAGNOSIS — I65.23 CAROTID STENOSIS, ASYMPTOMATIC, BILATERAL: Primary | ICD-10-CM

## 2020-09-18 DIAGNOSIS — I65.23 BILATERAL CAROTID ARTERY STENOSIS: ICD-10-CM

## 2020-09-18 DIAGNOSIS — I10 ESSENTIAL HYPERTENSION: ICD-10-CM

## 2020-09-18 DIAGNOSIS — E78.5 HYPERLIPIDEMIA, UNSPECIFIED HYPERLIPIDEMIA TYPE: ICD-10-CM

## 2020-09-18 PROCEDURE — 1036F TOBACCO NON-USER: CPT | Performed by: PHYSICIAN ASSISTANT

## 2020-09-18 PROCEDURE — 99244 OFF/OP CNSLTJ NEW/EST MOD 40: CPT | Performed by: PHYSICIAN ASSISTANT

## 2020-09-18 RX ORDER — LEVOTHYROXINE AND LIOTHYRONINE 38; 9 UG/1; UG/1
60 TABLET ORAL DAILY
COMMUNITY
End: 2022-06-29 | Stop reason: ALTCHOICE

## 2020-09-18 NOTE — PATIENT INSTRUCTIONS
Carotid artery stenosis      Carotid du R 50-69% (199/84/2  23) L < 50% (95/33)   total cholesterol 239 triglycerides 76 HDL 71       - Continue with aspirin 81 and Avapro  - We discussed sx of stroke for which she should call 911  - Follow up carotid duplex in 6 months per surveillance protocol  - Office visit in 12 months      Symptoms of stroke:  - Unable to speak or understand speech  - Unable to move one side of the body (arm or leg)  - Visual changes  - Call 911 for any symptoms of stroke          Carotid duplex 09/15/2020  FINDINGS:     Right        Impression  PSV  EDV (cm/s)  Direction of Flow  Ratio    Dist  ICA                 87          47                      0 97    Mid  ICA                 128          44                      1 43    Prox  ICA    50 - 69%    199          84                      2 23    Dist CCA                  87          31                              Mid CCA                   89          30                      0 88    Prox CCA                 102          37                              Ext Carotid               76          15                      0 85    Prox Vert                 57          19  Antegrade                   Subclavian               121          21                                 Left         Impression  PSV  EDV (cm/s)  Direction of Flow  Ratio    Dist  ICA                 48          24                      0 38    Mid  ICA                  74          36                      0 59    Prox   ICA    1 - 49%      95          35                      0 76    Dist CCA                  86          27                              Mid CCA                  124          41                      1 29    Prox CCA                  96          40                              Ext Carotid               82          21                      0 66    Prox Vert                 69          29  Antegrade                   Subclavian                94          14 CONCLUSION:     Impression  RIGHT:  There is 50-69% stenosis noted in the internal carotid artery  Plaque is  heterogenous and irregular  Vertebral artery flow is antegrade  There is no significant subclavian artery  disease  LEFT:  There is <50% stenosis noted in the internal carotid artery  Plaque is  heterogenous and irregular  Vertebral artery flow is antegrade  There is no significant subclavian artery  disease  Compared to previous study on 11/05/2019, there is no significant progression  of disease  Recommend repeat testing in 6 months as per protocol unless otherwise  indicated

## 2020-09-18 NOTE — PROGRESS NOTES
Assessment/Plan:    Bilateral carotid artery stenosis, R > L (asymptomatic)  Hypertension  Hyperlipidemia  Obesity    53 y/o F hypertension, hyperlipidemia, obesity, hypothyroidism, carotid artery stenosis who presents for vascular evaluation  - asymptomatic from carotid artery stenosis   - We reviewed the symptoms of stroke for which she should call 911     - Carotid du R 50-69% (199/84/2  23) L < 50% (95/33)  - Total cholesterol 239 triglycerides 76 HDL 71     Discussion:  The patient's carotid duplex studies have remained overall unchanged for the past  3 years  Her blood pressure in the office is stable today  She is on aspirin 81 milligrams and Avapro  We discussed that her cholesterol is not at goal   She seems averse to taking any medications and reports that she is unable to take statins as she developed weakness with Lipitor and Zocor  LDL goal would be  < 70  We discussed healthy lifestyle choices, heart healthy diet, low carbohydrates and regular exercise for weight loss  - Continue with aspirin 81 and Avapro  - Consider statin therapy, defer to PCP; It is unlikely that she would qualify for an injectable cholesterol Rx  - Patient education for healthy lifestyle changes and risk factor modification was provided  - She would benefit from regular exercise and weight loss  - Follow up carotid duplex in 6 months per surveillance protocol  - Office visit in 12 months or sooner, if needed         Diagnoses and all orders for this visit:    Carotid stenosis, asymptomatic, bilateral  -     VAS carotid complete study; Future    Essential hypertension  -     VAS carotid complete study; Future    Hyperlipidemia, unspecified hyperlipidemia type  -     VAS carotid complete study; Future    Bilateral carotid artery stenosis  -     Ambulatory referral to Vascular Surgery  -     VAS carotid complete study; Future    Other orders  -     thyroid (ARMOUR) 60 MG tablet;  Take 60 mg by mouth daily Subjective:      Patient ID: Chung Ramirez is a 54 y o  female  Patient presents today to review carotid study done 9/15  Patient denies any s/s of CVA  HPI   Chung Ramirez 53 y/o F hypertension, hyperlipidemia, obesity, hypothyroidism, carotid artery stenosis who presents for vascular evaluation  Patient is known for over 3 years that she has carotid artery stenosis  She remains asymptomatic  She has no symptoms of TIA/stroke  She denies transient visual loss, burry vision, difficulty speaking, facial numbness/weakness and arm/leg weakness  We reviewed the symptoms of stroke for which she should call 911  We reviewed her carotid duplex studies starting with 2017 through to the current study  The studies have remained overall unchanged over the years  The right internal carotid artery velocity may be slightly higher on the last study but also her blood pressure was in excess of 597 systolically  The patient requested that we review the actual carotid duplex images which we did  She also asked for copies of her testing which was provided to her  We also reviewed her most recent labs and testing  Her blood pressure in the office is stable today  She is on aspirin 81 milligrams and Avapro  We discussed that her cholesterol is not at goal   She seems averse to taking any medications and reports that she is unable to take statins as she developed weakness with Lipitor and Zocor  We discussed that her LDL goal would be  < 70  We discussed healthy lifestyle choices, heart healthy diet, low carbohydrates and regular exercise for weight loss  She will continue with routine Doppler surveillance every 6 months per protocol and we will see her in the office in 12 months           Tg 76 H 71 L 153  A!c 5 8  Renal function normal      VAS carotid du 09/15/2020  FINDINGS:     Right        Impression  PSV  EDV (cm/s)  Direction of Flow  Ratio    Dist  ICA                 87          47 0 97    Mid  ICA                 128          44                      1 43    Prox  ICA    50 - 69%    199          84                      2 23    Dist CCA                  87          31                              Mid CCA                   89          30                      0 88    Prox CCA                 102          37                              Ext Carotid               76          15                      0 85    Prox Vert                 57          19  Antegrade                   Subclavian               121          21                                 Left         Impression  PSV  EDV (cm/s)  Direction of Flow  Ratio    Dist  ICA                 48          24                      0 38    Mid  ICA                  74          36                      0 59    Prox  ICA    1 - 49%      95          35                      0 76    Dist CCA                  86          27                              Mid CCA                  124          41                      1 29    Prox CCA                  96          40                              Ext Carotid               82          21                      0 66    Prox Vert                 69          29  Antegrade                   Subclavian                94          14                                       CONCLUSION:     Impression  RIGHT:  There is 50-69% stenosis noted in the internal carotid artery  Plaque is  heterogenous and irregular  Vertebral artery flow is antegrade  There is no significant subclavian artery  disease  LEFT:  There is <50% stenosis noted in the internal carotid artery  Plaque is  heterogenous and irregular  Vertebral artery flow is antegrade  There is no significant subclavian artery  disease  Compared to previous study on 11/05/2019, there is no significant progression  of disease  Recommend repeat testing in 6 months as per protocol unless otherwise  Indicated          The following portions of the patient's history were reviewed and updated as appropriate: allergies, current medications, past family history, past medical history, past social history, past surgical history and problem list     No chest pain or SOB  No sx of claudication  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  Review of Systems   Constitutional: Negative  HENT: Negative  Eyes: Negative  Respiratory: Negative  Cardiovascular: Negative  Gastrointestinal: Negative  Endocrine: Negative  Genitourinary: Negative  Musculoskeletal: Negative  Skin: Negative  Allergic/Immunologic: Negative  Neurological: Negative  Hematological: Negative  Psychiatric/Behavioral: Negative  Objective:      /78 (BP Location: Left arm, Patient Position: Sitting)   Pulse 90   Temp 98 3 °F (36 8 °C) (Tympanic)   Ht 5' 3" (1 6 m)   Wt 82 1 kg (181 lb)   BMI 32 06 kg/m²      Physical Exam  Vitals signs and nursing note reviewed  Constitutional:       Appearance: She is well-developed  She is obese  HENT:      Head: Normocephalic and atraumatic  Eyes:      General: No scleral icterus  Pupils: Pupils are equal, round, and reactive to light  Neck:      Musculoskeletal: Neck supple  Thyroid: No thyromegaly  Vascular: No JVD  Trachea: Trachea normal    Cardiovascular:      Rate and Rhythm: Normal rate and regular rhythm  Pulses:           Carotid pulses are 2+ on the right side and 2+ on the left side  Radial pulses are 2+ on the right side and 2+ on the left side  Dorsalis pedis pulses are 1+ on the right side and 1+ on the left side  Heart sounds: Normal heart sounds, S1 normal and S2 normal  No murmur  No friction rub  No gallop  Pulmonary:      Effort: Pulmonary effort is normal  No accessory muscle usage or respiratory distress  Breath sounds: Normal breath sounds  No wheezing or rales     Abdominal:      General: Bowel sounds are normal  There is no distension  Palpations: Abdomen is soft  Tenderness: There is no abdominal tenderness  Musculoskeletal: Normal range of motion  General: No deformity  Skin:     General: Skin is warm and dry  Findings: No lesion or rash  Nails: There is no clubbing  Neurological:      Mental Status: She is alert and oriented to person, place, and time  Comments: Grossly normal    Psychiatric:         Behavior: Behavior is cooperative  I have reviewed and made appropriate changes to the review of systems input by the medical assistant      Vitals:    09/18/20 1109   BP: 124/78   BP Location: Left arm   Patient Position: Sitting   Pulse: 90   Temp: 98 3 °F (36 8 °C)   TempSrc: Tympanic   Weight: 82 1 kg (181 lb)   Height: 5' 3" (1 6 m)       Patient Active Problem List   Diagnosis    Carotid stenosis, asymptomatic, bilateral    HTN (hypertension)    Hypothyroid    Hyperlipidemia    Vitamin D deficiency       Past Surgical History:   Procedure Laterality Date    BREAST CYST EXCISION Left 2001    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    BREAST LUMPECTOMY Left 04/2001    DCIS    BREAST SURGERY      HIP SURGERY Right     1966, 1975, 1976        Family History   Problem Relation Age of Onset    Other Mother         Carotid stenosis, bilateral     Heart disease Mother     Hypertension Mother     Lung cancer Father         smoker    Liver cancer Father     Hypertension Father     Hypertension Brother     Heart attack Maternal Grandmother     Thyroid disease Maternal Grandmother     No Known Problems Sister     No Known Problems Maternal Grandfather     No Known Problems Paternal Grandmother     No Known Problems Paternal Grandfather     No Known Problems Sister     No Known Problems Brother     No Known Problems Maternal Aunt     No Known Problems Maternal Aunt        Social History     Socioeconomic History    Marital status: /Civil [employer]     Spouse name: Not on file    Number of children: Not on file    Years of education: Not on file    Highest education level: Not on file   Occupational History    Not on file   Social Needs    Financial resource strain: Not on file    Food insecurity     Worry: Not on file     Inability: Not on file    Transportation needs     Medical: Not on file     Non-medical: Not on file   Tobacco Use    Smoking status: Former Smoker    Smokeless tobacco: Never Used    Tobacco comment: Smoked 20yrs up to 1ppd    Quit in 2008   Substance and Sexual Activity    Alcohol use: Yes     Comment: Occasional     Drug use: Never    Sexual activity: Not Currently     Partners: Male   Lifestyle    Physical activity     Days per week: Not on file     Minutes per session: Not on file    Stress: Not on file   Relationships    Social connections     Talks on phone: Not on file     Gets together: Not on file     Attends [employer] witness service: Not on file     Active member of club or organization: Not on file     Attends meetings of clubs or organizations: Not on file     Relationship status: Not on file    Intimate partner violence     Fear of current or ex partner: Not on file     Emotionally abused: Not on file     Physically abused: Not on file     Forced sexual activity: Not on file   Other Topics Concern    Not on file   Social History Narrative    Not on file       Allergies   Allergen Reactions    Latex Swelling         Current Outpatient Medications:     ARMOUR THYROID 90 MG tablet, TAKE 1 TABLET DAILY, Disp: 30 tablet, Rfl: 0    Ascorbic Acid (VITAMIN C) 1000 MG tablet, Take 1,000 mg by mouth daily, Disp: , Rfl:     aspirin 81 MG tablet, Take 1 tablet by mouth daily, Disp: , Rfl:     B Complex Vitamins (B COMPLEX 50 PO), B Complex, Disp: , Rfl:     hydrochlorothiazide (HYDRODIURIL) 25 mg tablet, Take 25 mg by mouth daily, Disp: , Rfl:     ibuprofen (MOTRIN) 200 mg tablet, Take 800 mg by mouth every 6 (six) hours as needed for mild pain, Disp: , Rfl:     irbesartan (AVAPRO) 150 mg tablet, Take 150 mg by mouth every other day Alternating with 300mg dose, Disp: , Rfl:     Multiple Vitamins-Minerals (MULTIVITAMIN ADULT EXTRA C PO), multivitamin, Disp: , Rfl:     thyroid (ARMOUR) 60 MG tablet, Take 60 mg by mouth daily, Disp: , Rfl:

## 2020-09-18 NOTE — LETTER
September 18, 2020     Katie Flores DO  [address]    Patient: Bunny Conway   YOB: 1965   Date of Visit: 9/18/2020     Dear Dr Rossana Kahn      Thank you for referring Bunny Conway to me for evaluation  Below are the relevant portions of my assessment and plan of care  If you have questions, please do not hesitate to call me  I look forward to following Krzysztof Sean along with you  Sincerely,        Janet Rolon PA-C        CC: Murphy Dorado,     Progress Notes:      Assessment/Plan:    Bilateral carotid artery stenosis, R > L (asymptomatic)  Hypertension  Hyperlipidemia  Obesity    53 y/o F hypertension, hyperlipidemia, obesity, hypothyroidism, carotid artery stenosis who presents for vascular evaluation  - asymptomatic from carotid artery stenosis   - We reviewed the symptoms of stroke for which she should call 911     - Carotid du R 50-69% (199/84/2  23) L < 50% (95/33)  - Total cholesterol 239 triglycerides 76 HDL 71     Discussion:  The patient's carotid duplex studies have remained overall unchanged for the past  3 years  Her blood pressure in the office is stable today  She is on aspirin 81 milligrams and Avapro  We discussed that her cholesterol is not at goal   She seems averse to taking any medications and reports that she is unable to take statins as she developed weakness with Lipitor and Zocor  LDL goal would be  < 70  We discussed healthy lifestyle choices, heart healthy diet, low carbohydrates and regular exercise for weight loss  - Continue with aspirin 81 and Avapro  - Consider statin therapy; It is unlikely that she would qualify for an injectable cholesterol Rx  - Patient education for healthy lifestyle changes and risk factor modification was provided  - She would benefit from regular exercise and weight loss    - Follow up carotid duplex in 6 months per surveillance protocol  - Office visit in 12 months or sooner, if needed

## 2020-09-22 ENCOUNTER — ANNUAL EXAM (OUTPATIENT)
Dept: OBGYN CLINIC | Facility: MEDICAL CENTER | Age: 55
End: 2020-09-22
Payer: COMMERCIAL

## 2020-09-22 VITALS
SYSTOLIC BLOOD PRESSURE: 126 MMHG | DIASTOLIC BLOOD PRESSURE: 84 MMHG | BODY MASS INDEX: 33.1 KG/M2 | WEIGHT: 186.8 LBS | HEIGHT: 63 IN

## 2020-09-22 DIAGNOSIS — N95.2 VAGINAL ATROPHY: ICD-10-CM

## 2020-09-22 DIAGNOSIS — Z01.419 ENCOUNTER FOR ANNUAL ROUTINE GYNECOLOGICAL EXAMINATION: Primary | ICD-10-CM

## 2020-09-22 DIAGNOSIS — Z12.31 ENCOUNTER FOR SCREENING MAMMOGRAM FOR MALIGNANT NEOPLASM OF BREAST: ICD-10-CM

## 2020-09-22 PROCEDURE — S0612 ANNUAL GYNECOLOGICAL EXAMINA: HCPCS | Performed by: STUDENT IN AN ORGANIZED HEALTH CARE EDUCATION/TRAINING PROGRAM

## 2020-09-22 NOTE — PROGRESS NOTES
Assessment/Plan:    [address] [place] yo G0 here for annual exam, doing well  F/u in 1 yr or PRN       Problem List Items Addressed This Visit    Visit Diagnoses     Encounter for annual routine gynecological examination    -  Primary  Pap smear due 2024    Encounter for screening mammogram for malignant neoplasm of breast        Mammo screening bilateral w 3d & cad    Vaginal atrophy      Bleeding likely due to atrophy  Counseled pt to let us know if she has any bleeding unrelated to intercourse as that would prompt investigation into endometrial hyperplasia/carcinoma  She expressed understanding  Will trial premarin for atrophy  conjugated estrogens (PREMARIN) vaginal cream (Start on 9/24/2020)          Subjective:      Patient ID: Abdoul Schmidt is a [address] [place] y o  female  This is a [address] [place] y o  postmenopausal G0 who presents for annual exam   She recently had intercourse with her  and had some bleeding  They had not had intercourse for a long time  Reports she was well lubricated and it was enjoyable, but afterwards had bright red bleeding  Spotting continued until the next day  She denies vaginal bleeding that is not associated with intercourse  She denies urinary issues including stress and urge incontinence  She declines STD testing today  Last pap smear: Oct 2019 negative cytology and HPV  Last mammogram: 11/5/19 - negative  Breast lumpectomy in 2001 - normal since  Colonoscopy: Jan 16 2020 - f/u in 10 yrs  Family history: Three great aunts on mom's side  No first degree relatives  Had wedding on Halloween in full costume - considering vowel renewal this year on blue         The following portions of the patient's history were reviewed and updated as appropriate: allergies, current medications, past family history, past medical history, past social history, past surgical history and problem list     Review of Systems   Constitutional: Negative  HENT: Negative  Eyes: Negative      Respiratory: Negative  Cardiovascular: Negative  Gastrointestinal: Negative  Endocrine: Negative  Genitourinary: Negative for dyspareunia, dysuria, frequency, menstrual problem, pelvic pain, vaginal discharge and vaginal pain  Musculoskeletal: Negative  Skin: Negative  Allergic/Immunologic: Negative  Neurological: Negative  Hematological: Negative  Psychiatric/Behavioral: Negative  Objective:      /84 (BP Location: Left arm, Patient Position: Sitting, Cuff Size: Large)   Ht 5' 3" (1 6 m)   Wt 84 7 kg (186 lb 12 8 oz)   BMI 33 09 kg/m²          Physical Exam  Vitals signs reviewed  Exam conducted with a chaperone present  Neck:      Musculoskeletal: Normal range of motion  Cardiovascular:      Rate and Rhythm: Normal rate  Pulmonary:      Effort: Pulmonary effort is normal    Chest:      Breasts: Breasts are symmetrical          Right: No mass, nipple discharge, skin change or tenderness  Left: No mass, nipple discharge, skin change or tenderness  Abdominal:      Palpations: Abdomen is soft  Genitourinary:     Labia:         Right: No rash  Left: No rash  Vagina: Normal  No signs of injury  Uterus: Not enlarged and not fixed  Adnexa:         Right: No mass  Left: No mass  Comments: Vaginal atrophy  Pale/brown skin changes consistent with last year's exam  Had biopsy that was benign  Skin:     General: Skin is warm and dry  Neurological:      Mental Status: She is alert and oriented to person, place, and time

## 2020-10-23 ENCOUNTER — HOSPITAL ENCOUNTER (OUTPATIENT)
Dept: MAMMOGRAPHY | Facility: CLINIC | Age: 55
Discharge: HOME | End: 2020-10-23

## 2020-10-23 DIAGNOSIS — Z12.31 ENCOUNTER FOR SCREENING MAMMOGRAM FOR MALIGNANT NEOPLASM OF BREAST: ICD-10-CM

## 2021-03-21 ENCOUNTER — OFFICE VISIT (OUTPATIENT)
Dept: URGENT CARE | Facility: MEDICAL CENTER | Age: 56
End: 2021-03-21
Payer: COMMERCIAL

## 2021-03-21 VITALS
RESPIRATION RATE: 18 BRPM | TEMPERATURE: 98.2 F | WEIGHT: 175 LBS | HEART RATE: 94 BPM | OXYGEN SATURATION: 96 % | HEIGHT: 63 IN | BODY MASS INDEX: 31.01 KG/M2

## 2021-03-21 DIAGNOSIS — B00.2 HERPES STOMATITIS: ICD-10-CM

## 2021-03-21 DIAGNOSIS — J02.9 SORE THROAT: Primary | ICD-10-CM

## 2021-03-21 LAB — S PYO AG THROAT QL: NEGATIVE

## 2021-03-21 PROCEDURE — 87880 STREP A ASSAY W/OPTIC: CPT | Performed by: PHYSICIAN ASSISTANT

## 2021-03-21 PROCEDURE — 99213 OFFICE O/P EST LOW 20 MIN: CPT | Performed by: PHYSICIAN ASSISTANT

## 2021-03-21 PROCEDURE — 87070 CULTURE OTHR SPECIMN AEROBIC: CPT | Performed by: PHYSICIAN ASSISTANT

## 2021-03-21 NOTE — PROGRESS NOTES
[facility]        NAME: Grace Bella is a 54 y o  female  : 1965    MRN: [id number]  DATE: 2021  TIME: 3:54 PM    Assessment and Plan   Sore throat [J02 9]  1  Sore throat  POCT rapid strepA    Throat culture   2  Herpes stomatitis  al mag oxide-diphenhydramine-lidocaine viscous (MAGIC MOUTHWASH) 1:1:1 suspension         Patient Instructions     1  Motrin as needed for pain/swelling  2  Use Magic mouthwash every 4-6 hours as needed for pain  3  Increase fluids  4  Follow up with PCP in 3-5 days if symptoms persist        Chief Complaint     Chief Complaint   Patient presents with    Sore Throat     since thursday some swelling and white pustules         History of Present Illness       Caleb Conrad   Is a 59-year-old female presents with a 3 day history of acute onset sore throat and painful swallowing  Patient denies any fever, chills or body aches since the onset of her symptoms  She denies any nasal discharge or cough  Patient reports she had increasing throat pain over the past 24 hours with "white patches" on the left side of her throat      Review of Systems   Review of Systems   Constitutional: Negative  HENT: Positive for sore throat  Respiratory: Negative  Cardiovascular: Negative  Gastrointestinal: Negative            Current Medications       Current Outpatient Medications:     ARMOUR THYROID 90 MG tablet, TAKE 1 TABLET DAILY, Disp: 30 tablet, Rfl: 0    Ascorbic Acid (VITAMIN C) 1000 MG tablet, Take 1,000 mg by mouth daily, Disp: , Rfl:     aspirin 81 MG tablet, Take 1 tablet by mouth daily, Disp: , Rfl:     B Complex Vitamins (B COMPLEX 50 PO), B Complex, Disp: , Rfl:     hydrochlorothiazide (HYDRODIURIL) 25 mg tablet, Take 25 mg by mouth daily, Disp: , Rfl:     ibuprofen (MOTRIN) 200 mg tablet, Take 800 mg by mouth every 6 (six) hours as needed for mild pain, Disp: , Rfl:     irbesartan (AVAPRO) 150 mg tablet, Take 150 mg by mouth every other day Alternating with 300mg dose, Disp: , Rfl:     Multiple Vitamins-Minerals (MULTIVITAMIN ADULT EXTRA C PO), multivitamin, Disp: , Rfl:     thyroid (ARMOUR) 60 MG tablet, Take 60 mg by mouth daily, Disp: , Rfl:     al mag oxide-diphenhydramine-lidocaine viscous (MAGIC MOUTHWASH) 1:1:1 suspension, Swish and spit 10 mL every 4 (four) hours as needed for mouth pain or discomfort for up to 5 days, Disp: 90 Bottle, Rfl: 0    conjugated estrogens (PREMARIN) vaginal cream, Insert 0 5 g into the vagina 2 (two) times a week (Patient not taking: Reported on 3/21/2021), Disp: 30 g, Rfl: 0    Current Allergies     Allergies as of 03/21/2021 - Reviewed 03/21/2021   Allergen Reaction Noted    Latex Swelling 10/23/2019            The following portions of the patient's history were reviewed and updated as appropriate: allergies, current medications, past family history, past medical history, past social history, past surgical history and problem list      Past Medical History:   Diagnosis Date    Arthritis     Hyperlipidemia     Hypertension     Hypothyroid     Vitiligo        Past Surgical History:   Procedure Laterality Date    BREAST CYST EXCISION Left 2001    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    BREAST LUMPECTOMY Left 04/2001    DCIS    BREAST SURGERY      HIP SURGERY Right     1966, 1975, 1976        Family History   Problem Relation Age of Onset    Other Mother         Carotid stenosis, bilateral     Heart disease Mother     Hypertension Mother     Lung cancer Father         smoker    Liver cancer Father     Hypertension Father     Hypertension Brother     Heart attack Maternal Grandmother     Thyroid disease Maternal Grandmother     No Known Problems Sister     No Known Problems Maternal Grandfather     No Known Problems Paternal Grandmother     No Known Problems Paternal Grandfather     No Known Problems Sister     No Known Problems Brother     No Known Problems Maternal Aunt     No Known Problems Maternal Aunt          Medications have been verified  Objective   Pulse 94   Temp 98 2 °F (36 8 °C)   Resp 18   Ht 5' 3" (1 6 m)   Wt 79 4 kg (175 lb)   SpO2 96%   BMI 31 00 kg/m²   No LMP recorded  Patient is postmenopausal        Physical Exam     Physical Exam  Constitutional:       General: She is not in acute distress  Appearance: She is well-developed  She is not ill-appearing  HENT:      Head: Normocephalic and atraumatic  Right Ear: Tympanic membrane and ear canal normal       Left Ear: Tympanic membrane and ear canal normal       Nose: Nose normal       Mouth/Throat:     Cardiovascular:      Rate and Rhythm: Normal rate and regular rhythm  Heart sounds: Normal heart sounds  No murmur  Pulmonary:      Effort: Pulmonary effort is normal       Breath sounds: Normal breath sounds  Neurological:      Mental Status: She is alert

## 2021-03-21 NOTE — PATIENT INSTRUCTIONS
1  Motrin as needed for pain/swelling  2  Use Magic mouthwash every 4-6 hours as needed for pain  3  Increase fluids  4   Follow up with PCP in 3-5 days if symptoms persist

## 2021-03-24 LAB — BACTERIA THROAT CULT: NORMAL

## 2021-03-29 ENCOUNTER — HOSPITAL ENCOUNTER (OUTPATIENT)
Dept: RADIOLOGY | Facility: MEDICAL CENTER | Age: 56
Discharge: HOME | End: 2021-03-29
Payer: COMMERCIAL

## 2021-03-29 DIAGNOSIS — I10 ESSENTIAL HYPERTENSION: ICD-10-CM

## 2021-03-29 DIAGNOSIS — I65.23 BILATERAL CAROTID ARTERY STENOSIS: ICD-10-CM

## 2021-03-29 DIAGNOSIS — E78.5 HYPERLIPIDEMIA, UNSPECIFIED HYPERLIPIDEMIA TYPE: ICD-10-CM

## 2021-03-29 DIAGNOSIS — I65.23 CAROTID STENOSIS, ASYMPTOMATIC, BILATERAL: ICD-10-CM

## 2021-03-29 PROCEDURE — 93880 EXTRACRANIAL BILAT STUDY: CPT

## 2021-03-29 PROCEDURE — 93880 EXTRACRANIAL BILAT STUDY: CPT | Performed by: SURGERY

## 2021-05-19 ENCOUNTER — HOSPITAL ENCOUNTER (OUTPATIENT)
Dept: RADIOLOGY | Facility: MEDICAL CENTER | Age: 56
Discharge: HOME | End: 2021-05-19
Payer: COMMERCIAL

## 2021-05-19 VITALS — HEIGHT: 63 IN | BODY MASS INDEX: 31.01 KG/M2 | WEIGHT: 175 LBS

## 2021-05-19 PROCEDURE — 77063 BREAST TOMOSYNTHESIS BI: CPT

## 2021-05-19 PROCEDURE — 77067 SCR MAMMO BI INCL CAD: CPT

## 2021-05-20 ENCOUNTER — TELEPHONE (OUTPATIENT)
Dept: OBGYN CLINIC | Facility: CLINIC | Age: 56
End: 2021-05-20

## 2021-05-20 NOTE — TELEPHONE ENCOUNTER
----- Message from Narinder Fletcher MD sent at 5/20/2021  9:14 AM EDT -----  Please notify mammo is wnl  Repeat in 1 yr

## 2021-09-24 ENCOUNTER — ANNUAL EXAM (OUTPATIENT)
Dept: OBGYN CLINIC | Facility: MEDICAL CENTER | Age: 56
End: 2021-09-24
Payer: COMMERCIAL

## 2021-09-24 VITALS — BODY MASS INDEX: 31.89 KG/M2 | DIASTOLIC BLOOD PRESSURE: 68 MMHG | SYSTOLIC BLOOD PRESSURE: 118 MMHG | WEIGHT: 180 LBS

## 2021-09-24 DIAGNOSIS — Z01.419 ENCOUNTER FOR ANNUAL ROUTINE GYNECOLOGICAL EXAMINATION: Primary | ICD-10-CM

## 2021-09-24 DIAGNOSIS — Z12.31 ENCOUNTER FOR SCREENING MAMMOGRAM FOR MALIGNANT NEOPLASM OF BREAST: ICD-10-CM

## 2021-09-24 PROCEDURE — S0612 ANNUAL GYNECOLOGICAL EXAMINA: HCPCS | Performed by: STUDENT IN AN ORGANIZED HEALTH CARE EDUCATION/TRAINING PROGRAM

## 2021-09-24 RX ORDER — AMOXICILLIN 500 MG/1
500 TABLET, FILM COATED ORAL 2 TIMES DAILY
COMMUNITY

## 2021-09-24 NOTE — PROGRESS NOTES
Assessment/Plan:    65 yo G0 - annual exam   F/u in 1 year or prn     Problem List Items Addressed This Visit    Visit Diagnoses     Encounter for annual routine gynecological examination    -  Primary  Pap due   Colonoscopy and mammo up to date  Encounter for screening mammogram for malignant neoplasm of breast        Relevant Orders    Mammo screening bilateral w 3d & cad            Subjective:      Patient ID: Eddie Chaudhry is a 64 y o  female  This is a 64 y o  postmenopausal G0 who presents for annual exam  She denies vaginal bleeding, discharge, or pelvic pain  She denies urinary issues including stress and urge incontinence  She has regular bowel movements  At last year's appt reported bleeding and pain with intercourse  She and her  have not been sexually active and so she no longer has those issues  Last pap smear: 10/25/19 neg/neg  Last mammogram: 21 normal  Colon Cancer screenin20 - f/u in 10 years    Is currently doing keto + [employer] and losing weight  No changes in family history  The following portions of the patient's history were reviewed and updated as appropriate: allergies, current medications, past family history, past medical history, past social history, past surgical history and problem list     Review of Systems   Constitutional: Negative  HENT: Negative  Eyes: Negative  Respiratory: Negative  Cardiovascular: Negative  Gastrointestinal: Negative  Endocrine: Negative  Genitourinary: Negative for dyspareunia, dysuria, frequency, menstrual problem, pelvic pain, vaginal discharge and vaginal pain  Musculoskeletal: Negative  Skin: Negative  Allergic/Immunologic: Negative  Neurological: Negative  Hematological: Negative  Psychiatric/Behavioral: Negative            Objective:      /68 (BP Location: Left arm, Patient Position: Sitting, Cuff Size: Standard)   Wt 81 6 kg (180 lb)   BMI 31 89 kg/m² Physical Exam  Vitals reviewed  Cardiovascular:      Rate and Rhythm: Normal rate  Pulmonary:      Effort: Pulmonary effort is normal    Chest:      Breasts: Breasts are symmetrical          Right: No mass, nipple discharge, skin change or tenderness  Left: No mass, nipple discharge, skin change or tenderness  Abdominal:      Palpations: Abdomen is soft  Genitourinary:     Vagina: Normal  No signs of injury  Uterus: Not enlarged and not fixed  Adnexa:         Right: No mass  Left: No mass  Musculoskeletal:      Cervical back: Normal range of motion  Skin:     General: Skin is warm and dry  Neurological:      Mental Status: She is alert and oriented to person, place, and time

## 2022-05-04 ENCOUNTER — TELEPHONE (OUTPATIENT)
Dept: VASCULAR SURGERY | Facility: CLINIC | Age: 57
End: 2022-05-04

## 2022-05-04 DIAGNOSIS — I65.23 CAROTID STENOSIS, ASYMPTOMATIC, BILATERAL: Primary | ICD-10-CM

## 2022-05-04 NOTE — TELEPHONE ENCOUNTER
Attempted to contact patient to schedule appointment(s) listed below  Requested patient call [phone] option 3 to schedule appointment(s)  Patient's appointment(s) are due now      Dopplers  [] Abdominal Aorta Iliac (AOIL)  [x] Carotid (CV)   [] Celiac and/or Mesenteric  [] Endovascular Aortic Repair (EVAR)   [] Hemodialysis Access (HD)   [] Lower Limb Arterial (ERNA)  [] Lower Limb Venous Duplex with Reflux (LEVDR)  [] Renal Artery  [] Upper Limb (UEA)    Advanced Imaging   [] CTA abdomen    [] CTA abdomen & pelvis    [] CT abdomen with/ without contrast  [] CT abdomen with contrast  [] CT abdomen without contrast    [] CT abdomen & pelvis with/ without contrast  [] CT abdomen & pelvis with contrast  [] CT abdomen & pelvis without contrast    Office Visit   [] New patient, patient last seen over 3 years ago  [x] Risk factor modification (RFM) L/S RD 9/18/2020  [] Follow up

## 2022-05-16 ENCOUNTER — HOSPITAL ENCOUNTER (OUTPATIENT)
Dept: RADIOLOGY | Facility: MEDICAL CENTER | Age: 57
Discharge: HOME | End: 2022-05-16
Payer: COMMERCIAL

## 2022-05-16 DIAGNOSIS — I65.23 CAROTID STENOSIS, ASYMPTOMATIC, BILATERAL: ICD-10-CM

## 2022-05-16 PROCEDURE — 93880 EXTRACRANIAL BILAT STUDY: CPT | Performed by: SURGERY

## 2022-05-16 PROCEDURE — 93880 EXTRACRANIAL BILAT STUDY: CPT

## 2022-05-23 ENCOUNTER — HOSPITAL ENCOUNTER (OUTPATIENT)
Dept: MAMMOGRAPHY | Facility: CLINIC | Age: 57
Discharge: HOME | End: 2022-05-23
Payer: COMMERCIAL

## 2022-05-23 VITALS — HEIGHT: 63 IN | BODY MASS INDEX: 31.89 KG/M2 | WEIGHT: 180 LBS

## 2022-05-23 DIAGNOSIS — Z12.31 ENCOUNTER FOR SCREENING MAMMOGRAM FOR MALIGNANT NEOPLASM OF BREAST: ICD-10-CM

## 2022-05-23 PROCEDURE — 77063 BREAST TOMOSYNTHESIS BI: CPT

## 2022-05-23 PROCEDURE — 77067 SCR MAMMO BI INCL CAD: CPT

## 2022-05-26 ENCOUNTER — TELEPHONE (OUTPATIENT)
Dept: OBGYN CLINIC | Facility: CLINIC | Age: 57
End: 2022-05-26

## 2022-05-26 NOTE — TELEPHONE ENCOUNTER
Left voice message after checking her communication consent of Dr Megan Tavarez message:"Notify normal mammo - repeat in 1 year"  Also encouraged to call our office back if she would like to provide her e-mail address for us to send her a link to activate her [employer] account

## 2022-05-26 NOTE — TELEPHONE ENCOUNTER
----- Message from Serene Mcfarland MD sent at 5/26/2022  9:17 AM EDT -----  Notify normal mammo - repeat in 1 year

## 2022-06-29 ENCOUNTER — OFFICE VISIT (OUTPATIENT)
Dept: VASCULAR SURGERY | Facility: CLINIC | Age: 57
End: 2022-06-29
Payer: COMMERCIAL

## 2022-06-29 VITALS
HEIGHT: 63 IN | BODY MASS INDEX: 33.38 KG/M2 | OXYGEN SATURATION: 98 % | DIASTOLIC BLOOD PRESSURE: 68 MMHG | WEIGHT: 188.4 LBS | SYSTOLIC BLOOD PRESSURE: 110 MMHG | HEART RATE: 73 BPM

## 2022-06-29 DIAGNOSIS — I65.23 CAROTID STENOSIS, ASYMPTOMATIC, BILATERAL: ICD-10-CM

## 2022-06-29 DIAGNOSIS — I65.21 ASYMPTOMATIC CAROTID ARTERY STENOSIS WITHOUT INFARCTION, RIGHT: Primary | ICD-10-CM

## 2022-06-29 DIAGNOSIS — I65.23 CAROTID STENOSIS, ASYMPTOMATIC, BILATERAL: Primary | ICD-10-CM

## 2022-06-29 PROCEDURE — 99214 OFFICE O/P EST MOD 30 MIN: CPT | Performed by: SURGERY

## 2022-06-29 RX ORDER — B-COMPLEX WITH VITAMIN C
1 TABLET ORAL 2 TIMES DAILY WITH MEALS
COMMUNITY

## 2022-06-29 RX ORDER — IRBESARTAN 300 MG/1
TABLET ORAL
COMMUNITY
Start: 2022-04-22

## 2022-06-29 RX ORDER — THYROID, PORCINE 120 MG/1
TABLET ORAL
COMMUNITY
Start: 2022-04-28

## 2022-06-29 NOTE — PROGRESS NOTES
Assessment/Plan:    Carotid stenosis, asymptomatic, bilateral  Juliana Saul is a 75-year-old who was previously seen by me back in 2017 for asymptomatic carotid stenosis  Recent surveillance carotid duplex demonstrated step-up in velocities on the right  Duplex suggest now a right 70-99% stenosis with a velocity of 401/167  She remains asymptomatic from a carotid artery disease standpoint  We discussed the indications for carotid intervention  Will obtain a CTA of the neck with return office visit to discuss results  Continue aspirin  Ideally she should be on statin therapy however she has stated multiple times that she is intolerant despite trial of multiple medications  I have asked that she cycle back and discuss with PCP whether she may be a candidate for repatha         Diagnoses and all orders for this visit:    Asymptomatic carotid artery stenosis without infarction, right  -     CTA neck w wo contrast; Future    Carotid stenosis, asymptomatic, bilateral    Other orders  -     irbesartan (AVAPRO) 300 mg tablet  -     Paden Thyroid 120 MG tablet  -     calcium carbonate-vitamin D (OSCAL-D) 500 mg-200 units per tablet; Take 1 tablet by mouth 2 (two) times a day with meals  -     B Complex Vitamins (VITAMIN B COMPLEX PO); B Complex  -     Multiple Vitamin (MULTIVITAMIN ADULT PO); Take by mouth          Subjective:      Patient ID: Mendoza Ignacio is a 64 y o  female  Patient is here today to review results of CV done5/16/2022  Patient denies any headaches, dizziness, sudden loss of vision, trouble swallowing, slurred speech, TIA or Stroke symptoms  Patient is taking ASA 81 mg  She is a former smoker  Juliana Saul returns to the office review surveillance carotid duplex  She has known asymptomatic carotid artery disease  Recent duplex suggested a step-up in velocities on the right suggesting now a 70-99% stenosis  She remains asymptomatic  She remains on aspirin    Of note she has stated on multiple occasions that she is intolerant of multiple statins  The following portions of the patient's history were reviewed and updated as appropriate: allergies, current medications, past family history, past medical history, past social history, past surgical history and problem list     Review of Systems   Constitutional: Negative  HENT: Positive for rhinorrhea  Eyes: Negative  Respiratory: Negative  Cardiovascular: Negative  Gastrointestinal: Negative  Endocrine: Negative  Genitourinary: Positive for frequency  Musculoskeletal: Positive for arthralgias, back pain and neck pain  Skin: Negative  Allergic/Immunologic: Negative  Neurological: Negative  Hematological: Bruises/bleeds easily  Psychiatric/Behavioral: Negative  I have personally reviewed the ROS entered by MA and agree as documented  Objective:      /68 (BP Location: Left arm, Patient Position: Sitting, Cuff Size: Standard)   Pulse 73   Ht 5' 3" (1 6 m)   Wt 85 5 kg (188 lb 6 4 oz)   SpO2 98%   BMI 33 37 kg/m²          Physical Exam  Constitutional:       General: She is not in acute distress  Appearance: She is well-developed  HENT:      Head: Normocephalic and atraumatic  Eyes:      General: No scleral icterus  Conjunctiva/sclera: Conjunctivae normal    Neck:      Trachea: No tracheal deviation  Cardiovascular:      Rate and Rhythm: Normal rate and regular rhythm  Heart sounds: Normal heart sounds  Pulmonary:      Effort: Pulmonary effort is normal       Breath sounds: Normal breath sounds  Abdominal:      General: There is no distension  Palpations: Abdomen is soft  There is no mass (no appreciable aortic pulsation/aneurysm)  Tenderness: There is no abdominal tenderness  There is no guarding or rebound  Musculoskeletal:         General: Normal range of motion  Cervical back: Normal range of motion and neck supple  Skin:     General: Skin is warm and dry  Neurological:      Mental Status: She is alert and oriented to person, place, and time  Psychiatric:         Mood and Affect: Mood normal          Behavior: Behavior normal          Thought Content: Thought content normal          Judgment: Judgment normal        carotid duplex:   CONCLUSION:  Impression  RIGHT:  There is 70-99% stenosis noted in the internal carotid artery  Plaque is  heterogenous and irregular  Vertebral artery flow is antegrade  There is no significant subclavian artery  disease  LEFT:  There is <50% stenosis noted in the internal carotid artery  Plaque is  heterogenous and irregular  Vertebral artery flow is antegrade  There is no significant subclavian artery  disease  Compared to previous study on 3/29/2021, there is an increase in the disease  process on the right  Recommend repeat testing in 6month as per protocol unless otherwise indicated

## 2022-06-29 NOTE — ASSESSMENT & PLAN NOTE
[facility] is a 27-year-old who was previously seen by me back in 2017 for asymptomatic carotid stenosis  Recent surveillance carotid duplex demonstrated step-up in velocities on the right  Duplex suggest now a right 70-99% stenosis with a velocity of 401/167  She remains asymptomatic from a carotid artery disease standpoint  We discussed the indications for carotid intervention  Will obtain a CTA of the neck with return office visit to discuss results  Continue aspirin  Ideally she should be on statin therapy however she has stated multiple times that she is intolerant despite trial of multiple medications    I have asked that she cycle back and discuss with PCP whether she may be a candidate for repatha

## 2022-08-08 ENCOUNTER — HOSPITAL ENCOUNTER (OUTPATIENT)
Dept: RADIOLOGY | Facility: MEDICAL CENTER | Age: 57
Discharge: HOME | End: 2022-08-08
Payer: COMMERCIAL

## 2022-08-08 DIAGNOSIS — I65.21 ASYMPTOMATIC CAROTID ARTERY STENOSIS WITHOUT INFARCTION, RIGHT: ICD-10-CM

## 2022-08-08 PROCEDURE — G1004 CDSM NDSC: HCPCS

## 2022-08-08 PROCEDURE — 70498 CT ANGIOGRAPHY NECK: CPT

## 2022-08-08 RX ADMIN — IOHEXOL 65 ML: 350 INJECTION, SOLUTION INTRAVENOUS at 14:54

## 2022-08-10 ENCOUNTER — TELEPHONE (OUTPATIENT)
Dept: VASCULAR SURGERY | Facility: CLINIC | Age: 57
End: 2022-08-10

## 2022-08-10 DIAGNOSIS — I67.1 ANTERIOR COMMUNICATING ARTERY ANEURYSM: Primary | ICD-10-CM

## 2022-08-10 NOTE — TELEPHONE ENCOUNTER
Spoke with pt and made her aware that the referral was placed  Gave her the phone number to schedule the appt with neurosurgery  She will keep the OV with provider to discuss the carotid results

## 2022-08-10 NOTE — TELEPHONE ENCOUNTER
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

## 2022-08-10 NOTE — TELEPHONE ENCOUNTER
Whit Alanis from [facility] radiology called with significant findings on the CTA of the neck from 8/8/22  Results able to be viewed in Epic  Pt has an OV on 8/17/22 to review the results  Routed to triage to advise if there are any recommendations before pt's scheduled visit

## 2022-08-17 ENCOUNTER — OFFICE VISIT (OUTPATIENT)
Dept: VASCULAR SURGERY | Facility: CLINIC | Age: 57
End: 2022-08-17
Payer: COMMERCIAL

## 2022-08-17 ENCOUNTER — TELEPHONE (OUTPATIENT)
Dept: VASCULAR SURGERY | Facility: CLINIC | Age: 57
End: 2022-08-17

## 2022-08-17 VITALS
WEIGHT: 184.4 LBS | HEART RATE: 77 BPM | OXYGEN SATURATION: 98 % | SYSTOLIC BLOOD PRESSURE: 116 MMHG | BODY MASS INDEX: 32.67 KG/M2 | HEIGHT: 63 IN | DIASTOLIC BLOOD PRESSURE: 80 MMHG

## 2022-08-17 DIAGNOSIS — I65.21 ASYMPTOMATIC STENOSIS OF RIGHT CAROTID ARTERY: Primary | ICD-10-CM

## 2022-08-17 DIAGNOSIS — I65.23 CAROTID STENOSIS, ASYMPTOMATIC, BILATERAL: ICD-10-CM

## 2022-08-17 PROCEDURE — 99214 OFFICE O/P EST MOD 30 MIN: CPT | Performed by: SURGERY

## 2022-08-17 RX ORDER — CHLORHEXIDINE GLUCONATE 0.12 MG/ML
15 RINSE ORAL ONCE
Status: CANCELLED | OUTPATIENT
Start: 2022-10-13 | End: 2022-08-17

## 2022-08-17 RX ORDER — CHLORHEXIDINE GLUCONATE 0.12 MG/ML
15 RINSE ORAL EVERY 12 HOURS SCHEDULED
Status: CANCELLED | OUTPATIENT
Start: 2022-10-13

## 2022-08-17 RX ORDER — CEFAZOLIN SODIUM 2 G/50ML
2000 SOLUTION INTRAVENOUS ONCE
Status: CANCELLED | OUTPATIENT
Start: 2022-10-13 | End: 2022-08-17

## 2022-08-17 NOTE — ASSESSMENT & PLAN NOTE
Asymptomatic high-grade right carotid stenosis  Patient returns to the office review CTA of the neck  There is focal dense atherosclerotic plaque at the bifurcation/proximal internal carotid artery on the right resulting in a high-grade stenosis  We discussed the options of stenting versus standard endarterectomy  Due to vessel tortuosity and calcific plaque I am concerned that stent may not fully expand  Recommend right carotid endarterectomy  Discuss with patient and  that her lesion is somewhat high and tortuous and may certainly pose challenges to dissection/exposure  The procedure for right carotid endarterectomy (eversion endarterectomy), benefits, risks, and anticipated postop course discussed in detail  All questions answered to her satisfaction  Patient agrees to proceed  Written consent was obtained  Of note patient was also noted to have a 5 5 mm aneurysm of the anterior communicating artery  She is scheduled see Dr Vielka Sanchez in late September  Will plan on right carotid endarterectomy in early October

## 2022-08-17 NOTE — TELEPHONE ENCOUNTER
REMINDER: Under Reason For Call, comments MUST be formatted as:   (Surgeon's Initials) / (Procedure)      Special Instructions / FYI: Patient does not want to be scheduled for surgery until October 2022  (patient has appt end of september with Neurosurgery for small intracranial aneurysm ) Please schedule surgery after seen by neurosurgery    Procedure:  Right CEA (eversion    Level: 4 - Route clearance(s) to [facility] Surgery Coordinator Pool    Allergies: Latex    Instructions Given: NO Bowel Prep General Instructions     Dialysis: Patient is not on dialysis  Return Visit Required Prior to Procedure: No     Consent: I certify that patient has signed, printed, timed, and dated their surgery consent  I certify that the patient's LEGAL NAME and DATE OF BIRTH are written in the upper left corner on BOTH sides of the consent  I certify that BOTH sides of the completed surgery consent have been scanned into the patient's Epic chart by myself on 8/17/2022  Yes, I have LABELED the consent in Epic as Consent for Vascular Procedure  For Surgical Clearances     Levels   1-3   ROUTE this encounter to [facility] Clearance Pool (AND)   [facility] Surgery Coordinator Pool     Level   4   ROUTE this encounter to [facility] Surgery Coordinator Pool       HYDRATION CLEARANCES   ONLY ROUTE TO  [facility] Clearance Pool     Patient does not require any pre operative clearance  Yes, I have ROUTED this encounter to [facility] Surgery Coordinator and/or [facility] Clearance Pool

## 2022-08-17 NOTE — PROGRESS NOTES
Assessment/Plan:    Carotid stenosis, asymptomatic, bilateral  Asymptomatic high-grade right carotid stenosis  Patient returns to the office review CTA of the neck  There is focal dense atherosclerotic plaque at the bifurcation/proximal internal carotid artery on the right resulting in a high-grade stenosis  We discussed the options of stenting versus standard endarterectomy  Due to vessel tortuosity and calcific plaque I am concerned that stent may not fully expand  Recommend right carotid endarterectomy  Discuss with patient and  that her lesion is somewhat high and tortuous and may certainly pose challenges to dissection/exposure  The procedure for right carotid endarterectomy (eversion endarterectomy), benefits, risks, and anticipated postop course discussed in detail  All questions answered to her satisfaction  Patient agrees to proceed  Written consent was obtained  Of note patient was also noted to have a 5 5 mm aneurysm of the anterior communicating artery  She is scheduled see Dr Ree Hicks in late September  Will plan on right carotid endarterectomy in early October  Diagnoses and all orders for this visit:    Carotid stenosis, asymptomatic, bilateral          Subjective:      Patient ID: Adi Stone is a 64 y o  female  Patient is here today to review results of CTA neck w/wo contrast done 8/8/2022  He had a CV done 5/16/2022  Patient c/o dizziness  She denies any headaches, sudden loss of vision, trouble swallowing, slurred speech, TIA or Stroke symptoms  Patient is taking ASA 81 mg daily  She is a former smoker  Quinton Membreno returns to the office to discuss results of CTA neck  No new focal neurologic changes in the interim        The following portions of the patient's history were reviewed and updated as appropriate: allergies, current medications, past family history, past medical history, past social history, past surgical history and problem list     Review of Systems   Constitutional: Positive for fatigue  HENT: Negative  Eyes: Negative  Respiratory: Negative  Cardiovascular: Positive for leg swelling  Gastrointestinal: Negative  Endocrine: Negative  Genitourinary: Positive for frequency  Musculoskeletal: Positive for arthralgias  Skin: Negative  Allergic/Immunologic: Negative  Neurological: Positive for dizziness  Hematological: Bruises/bleeds easily  Psychiatric/Behavioral: Negative  I have personally reviewed the ROS entered by MA and agree as documented  Objective:      /80 (BP Location: Left arm, Patient Position: Sitting, Cuff Size: Standard)   Pulse 77   Ht 5' 3" (1 6 m)   Wt 83 6 kg (184 lb 6 4 oz)   SpO2 98%   BMI 32 66 kg/m²          Physical Exam  Constitutional:       General: She is not in acute distress  Appearance: She is well-developed  HENT:      Head: Normocephalic and atraumatic  Eyes:      General: No scleral icterus  Conjunctiva/sclera: Conjunctivae normal    Neck:      Trachea: No tracheal deviation  Cardiovascular:      Rate and Rhythm: Normal rate and regular rhythm  Heart sounds: Normal heart sounds  Pulmonary:      Effort: Pulmonary effort is normal       Breath sounds: Normal breath sounds  Abdominal:      General: There is no distension  Palpations: Abdomen is soft  There is no mass (no appreciable aortic pulsation/aneurysm)  Tenderness: There is no abdominal tenderness  There is no guarding or rebound  Musculoskeletal:         General: Normal range of motion  Cervical back: Normal range of motion and neck supple  Skin:     General: Skin is warm and dry  Neurological:      Mental Status: She is alert and oriented to person, place, and time  Psychiatric:         Mood and Affect: Mood normal          Behavior: Behavior normal          Thought Content: Thought content normal          Judgment: Judgment normal        CTA neck:  IMPRESSION:     1  Estimated 83% atherosclerotic stenosis at the origin of left cervical ICA      2  No significant stenosis of left cervical ICA      3  Incidentally noted anterosuperiorly 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      4  Anatomic variant retropharyngeal course of bilateral cervical internal carotid arteries      5   Mild sinus disease  Operative Scheduling Information:    Hospital:  [facility]    Physician:  Blair Madera and Burak Schulz or Fellow    Surgery: Right CEA (eversion    Urgency:  Standard    Level:  Level 4: Outpatients to be scheduled for screening procedures and elective surgery that can be delayed for longer than one month without reasonable expectation of detriment to patient   (patient has appt end of september with Neurosurgery for small intracranial aneurysm ) Please schedule surgery after seen by neurosurgery      Case Length:  3 hours    Post-op Bed:  Stepdown    OR Table:  Standard    Equipment Needs:  Rep: SSEP/EEG neuromonitoring    Medication Instructions:  Aspirin:   Continue (do not hold)    Hydration:  No    Contrast Allergy:  no

## 2022-09-06 NOTE — TELEPHONE ENCOUNTER
Patient called for date of surgery I told that we have a tentative date of 10-13-22 we are waiting for her to see Dr Ryan Prince on 9-19-22 and will call with details LLF Tranexamic Acid Pregnancy And Lactation Text: It is unknown if this medication is safe during pregnancy or breast feeding.

## 2022-09-19 ENCOUNTER — CONSULT (OUTPATIENT)
Dept: NEUROSURGERY | Facility: CLINIC | Age: 57
End: 2022-09-19
Payer: COMMERCIAL

## 2022-09-19 VITALS
TEMPERATURE: 98.2 F | RESPIRATION RATE: 16 BRPM | SYSTOLIC BLOOD PRESSURE: 118 MMHG | HEART RATE: 80 BPM | HEIGHT: 63 IN | BODY MASS INDEX: 32.25 KG/M2 | WEIGHT: 182 LBS | DIASTOLIC BLOOD PRESSURE: 80 MMHG

## 2022-09-19 DIAGNOSIS — I67.1 ANTERIOR COMMUNICATING ARTERY ANEURYSM: Primary | ICD-10-CM

## 2022-09-19 PROCEDURE — 99204 OFFICE O/P NEW MOD 45 MIN: CPT | Performed by: NEUROLOGICAL SURGERY

## 2022-09-19 RX ORDER — SODIUM CHLORIDE 9 MG/ML
75 INJECTION, SOLUTION INTRAVENOUS CONTINUOUS
OUTPATIENT
Start: 2022-10-13

## 2022-09-19 NOTE — PROGRESS NOTES
Patient Id: Popeye Hinson is a 62 y o  female        Handedness: Right      Assessment/Plan:    Diagnoses and all orders for this visit:    Anterior communicating artery aneurysm  -     Ambulatory Referral to Neurosurgery  -     IR cerebral angiography; Future    Other orders  -     Diet NPO; Sips with meds; Standing  -     Nursing communication Apply gown prior to procedure; Standing  -     Have Patient Void On Call to Procedure Room; Standing  -     Insert and Maintain IV; Standing  -     sodium chloride 0 9 % infusion        Discussion Summary:   1  Incidental 5-6mm ACoA aneurysm  We discussed the natural history of intracranial aneurysms and subarachnoid hemorrhage  We specifically discussed the risks associated with aneurysms based on the size and location  Based on [facility] her risk of hemorrhage is still 0% over 5 years versus [facility] where it is 0 75 % annually  Given her age location of the aneurysm and morphology of the aneurysm I do believe it is reasonable to evaluate this for treatment  As such we did discuss the risks and benefits of a diagnostic angiogram including bleeding vascular injury and stroke  She has elected to proceed and signed surgical consent today  We discussed the warning signs of subarachnoid hemorrhage in reasons to go to the emergency room  In addition we discussed modifiable risk factors for aneurysmal growth and subarachnoid hemorrhage  2  Family history of aneurysm  Given the presence of an aneurysm in her mother and her I recommend that her siblings be screened with an MRA  3  Carotid stenosis regarding right carotid endarterectomy  From my standpoint she is cleared to have this done  I will schedule her angiogram approximately 6-8 weeks after it is completed to better evaluate her aneurysm    I would address this prior to addressing her aneurysm      Chief Complaint: Consult and Aneurysm        HPI:   This is a very pleasant 70-year-old female who has been followed for her carotid stenosis for an extended period time  She was noted to have interval worsening stenosis and evaluated for carotid endarterectomy  As part of this evaluation a CT a of her neck was performed which demonstrated a 5 5 mm anterior communicating artery aneurysm  She presents for evaluation of this today  She has no history of TIA  She did have extensive right hip surgery and does have some difficulty walking due to this  Her past medical history significant for hypertension, carotid stenosis  Her past surgical history significant for left leg surgery and right breast surgery  She is not allergic to any medications  Patient is  with no children , 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  She is a former smoker, denies any alcohol usage, a very long time ago did use drugs  Mother had brain aneurysm  Grandmother  suddenly of unknown cause  Review of systems obtained by the MA reviewed and updated below  Review of Systems   HENT: Negative for tinnitus  Eyes: Positive for visual disturbance (wears glasses for a long time )  Respiratory: Positive for shortness of breath (sometimes - going up stairs )  Negative for wheezing  Cardiovascular: Negative for chest pain  Gastrointestinal: Negative  Genitourinary: Positive for frequency  Musculoskeletal: Negative for back pain, gait problem, myalgias and neck pain  Neurological: Positive for dizziness (occa ) and numbness (bilateral hands , more so when she is sleeping)  Negative for tremors, seizures, speech difficulty, weakness and headaches  Hematological: Bruises/bleeds easily (asa81)  Physical Exam  Vitals:    22 1522   BP: 118/80   Pulse: 80   Resp: 16   Temp: 98 2 °F (36 8 °C)   She is well appearing  Affect is appropriate  Body mass index is 32 24 kg/m²  Deepali Freire She is awake alert and oriented  Hearing and vision are grossly intact  Her pupils are equal round reactive to light    Her extraocular movements are intact  Her face is symmetric  Tongue is midline  Facial sensation is intact and symmetric throughout  Shoulder shrug is 5/5  There is no drift or dysmetria  She has full strength in her bilateral upper and lower extremities  She has normal muscle tone muscle bulk  Her biceps reflexes and patellar reflexes are 2+ and symmetric  Fermín sign negative bilaterally  Sensation intact to light touch and pinprick throughout  Her gait is normal      Her heart rate is regular  Normal respiratory effort  Abdomen nondistended  Radial pulses 2+       The following portions of the patient's history were reviewed and updated as appropriate: allergies, current medications, past family history, past medical history, past social history, past surgical history and problem list     Active Ambulatory Problems     Diagnosis Date Noted    Carotid stenosis, asymptomatic, bilateral 07/24/2017    HTN (hypertension) 10/23/2019    Hypothyroid 10/23/2019    Hyperlipidemia 10/23/2019    Vitamin D deficiency 11/06/2019     Resolved Ambulatory Problems     Diagnosis Date Noted    No Resolved Ambulatory Problems     Past Medical History:   Diagnosis Date    Arthritis     Breast cancer ([facility] [address] ) 2001    Hypertension     Vitiligo        Past Surgical History:   Procedure Laterality Date    BREAST CYST EXCISION Left 2001    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    BREAST LUMPECTOMY Left 04/2001    DCIS    BREAST SURGERY      HIP SURGERY Right     1966, 1975, 1976          Current Outpatient Medications:     [place] Thyroid 120 MG tablet, Take 120 mg by mouth every other day, Disp: , Rfl:     ARMOUR THYROID 90 MG tablet, TAKE 1 TABLET DAILY (Patient taking differently: Take 90 mg by mouth every other day), Disp: 30 tablet, Rfl: 0    Ascorbic Acid (VITAMIN C) 1000 MG tablet, Take 1,000 mg by mouth daily, Disp: , Rfl:     aspirin 81 MG tablet, Take 1 tablet by mouth daily, Disp: , Rfl:     B Complex Vitamins (VITAMIN B COMPLEX PO), B Complex, Disp: , Rfl:     calcium carbonate-vitamin D (OSCAL-D) 500 mg-200 units per tablet, Take 1 tablet by mouth 2 (two) times a day with meals, Disp: , Rfl:     hydrochlorothiazide (HYDRODIURIL) 25 mg tablet, Take 25 mg by mouth daily, Disp: , Rfl:     ibuprofen (MOTRIN) 200 mg tablet, Take 800 mg by mouth every 6 (six) hours as needed for mild pain, Disp: , Rfl:     irbesartan (AVAPRO) 150 mg tablet, Take 150 mg by mouth every other day Alternating with 300mg dose, Disp: , Rfl:     irbesartan (AVAPRO) 300 mg tablet, Take 300 mg by mouth every other day, Disp: , Rfl:     Multiple Vitamin (MULTIVITAMIN ADULT PO), Take by mouth, Disp: , Rfl:     amoxicillin (AMOXIL) 500 MG tablet, Take 500 mg by mouth 2 (two) times a day (Patient not taking: Reported on 8/17/2022), Disp: , Rfl:     Results/Data: We reviewed her imaging in detail as well as the report

## 2022-09-20 ENCOUNTER — PREP FOR PROCEDURE (OUTPATIENT)
Dept: VASCULAR SURGERY | Facility: CLINIC | Age: 57
End: 2022-09-20

## 2022-09-20 NOTE — TELEPHONE ENCOUNTER
Case Confirmation #: [id number] from [address] [place] [address]  Patient #: [id number]  Surgeon: Marissa Kemp  Patient Procedures: Vascular: Carotid endarterectomy (CEA)  Monitoring Modalities Ordered: EEG  Time of Surgery: 08:00 EDT on 10/13/2022  Date of Order: Tuesday, September 20, 2022  Scheduling Note: Per keith 9/20

## 2022-09-20 NOTE — TELEPHONE ENCOUNTER
Verified patient's insurance   CONFIRMED - Patient's insurance is [employer]  Is patient requesting a call when authorization has been obtained? Patient did not request a call  Surgery Date: 10/13/22  Primary Surgeon: Criss Camacho // Tj Cartwright (NPI: [id number])  Assisting Surgeon: fellow or [address]: Katelynn (Tax: [id number] / NPI: [id number])  Inpatient / Outpatient: Inpatient  Level: 4    Clearance Received: Yes, Cardiology   Consent Received: Yes, scanned into Epic on 8/17/22  Medication Hold / Last Dose: no hold on aspirin  VQI Spreadsheet: 9/20/22  IR Notified: Not Applicable (N/A)  Rep  Notified: nuvasive 9/20/22  Equipment Needs: Rep: SSEP/EEG neuromonitoring      Vas Lab Requested: emailed 9/20/22  Patient Contacted: 9/20/22    Diagnosis: I65 21  Procedure/ CPT Code(s): (CEA) Carotid Endarterectomy / Patch Angioplasty // CPT: 10160    For varicose vein related procedures:   Last LEVDR: Not Applicable (N/A)  CEAP Classification: Not Applicable (N/A)  VCSS: Not Applicable (N/A)    Post Operative Date/ Time: 10/27/22 , 1pm Maulik with KAI Verduzco (NPI: [id number])     *Please review medication hold(s), PATs, and check H&P with patient  *  PATIENT WAS MAILED SURGERY/SHOWERING/DISCHARGE/COVID INSTRUCTIONS AFTER REVIEWING WITH THEM VIA PHONE CALL  Lab scripts and discharge instructions were mailed to patient per her request today

## 2022-09-21 DIAGNOSIS — I67.1 ANTERIOR COMMUNICATING ARTERY ANEURYSM: Primary | ICD-10-CM

## 2022-09-21 NOTE — TELEPHONE ENCOUNTER
Authorization requirements reviewed  Please refer to Alicia Lerner / Maday Martyr number [id number] for case updates

## 2022-09-22 NOTE — TELEPHONE ENCOUNTER
Rec call from [facility] w/some questions about labs and covid screening  She is not vaccinated so I advised her to have labs done 5-6 days prior to procedure  She is also requesting a phone call from Dr Larry Miller in reference to procedure and how this will be done  Her  has a different understanding than she does and they need some clarification  She was advised Dr Larry Miller is away until Monday and to expect a CB next week

## 2022-09-29 ENCOUNTER — LAB REQUISITION (OUTPATIENT)
Dept: LAB | Facility: HOSPITAL | Age: 57
End: 2022-09-29
Payer: COMMERCIAL

## 2022-09-29 ENCOUNTER — APPOINTMENT (OUTPATIENT)
Dept: LAB | Facility: MEDICAL CENTER | Age: 57
End: 2022-09-29
Payer: COMMERCIAL

## 2022-09-29 DIAGNOSIS — I65.21 ASYMPTOMATIC STENOSIS OF RIGHT CAROTID ARTERY: ICD-10-CM

## 2022-09-29 DIAGNOSIS — I65.23 CAROTID STENOSIS, ASYMPTOMATIC, BILATERAL: ICD-10-CM

## 2022-09-29 DIAGNOSIS — I65.21 OCCLUSION AND STENOSIS OF RIGHT CAROTID ARTERY: ICD-10-CM

## 2022-09-29 LAB
ABO GROUP BLD: NORMAL
ANION GAP SERPL CALCULATED.3IONS-SCNC: 7 MMOL/L (ref 4–13)
BLD GP AB SCN SERPL QL: NEGATIVE
BUN SERPL-MCNC: 17 MG/DL (ref 5–25)
CALCIUM SERPL-MCNC: 8.9 MG/DL (ref 8.3–10.1)
CHLORIDE SERPL-SCNC: 98 MMOL/L (ref 96–108)
CO2 SERPL-SCNC: 28 MMOL/L (ref 21–32)
CREAT SERPL-MCNC: 0.88 MG/DL (ref 0.6–1.3)
ERYTHROCYTE [DISTWIDTH] IN BLOOD BY AUTOMATED COUNT: 15.7 % (ref 11.6–15.1)
GFR SERPL CREATININE-BSD FRML MDRD: 73 ML/MIN/1.73SQ M
GLUCOSE P FAST SERPL-MCNC: 97 MG/DL (ref 65–99)
HCT VFR BLD AUTO: 31.7 % (ref 34.8–46.1)
HGB BLD-MCNC: 10.6 G/DL (ref 11.5–15.4)
MCH RBC QN AUTO: 33.7 PG (ref 26.8–34.3)
MCHC RBC AUTO-ENTMCNC: 33.4 G/DL (ref 31.4–37.4)
MCV RBC AUTO: 101 FL (ref 82–98)
PLATELET # BLD AUTO: 211 THOUSANDS/UL (ref 149–390)
PMV BLD AUTO: 10.7 FL (ref 8.9–12.7)
POTASSIUM SERPL-SCNC: 4 MMOL/L (ref 3.5–5.3)
RBC # BLD AUTO: 3.15 MILLION/UL (ref 3.81–5.12)
RH BLD: NEGATIVE
SODIUM SERPL-SCNC: 133 MMOL/L (ref 135–147)
SPECIMEN EXPIRATION DATE: NORMAL
WBC # BLD AUTO: 3.23 THOUSAND/UL (ref 4.31–10.16)

## 2022-09-29 PROCEDURE — 85027 COMPLETE CBC AUTOMATED: CPT

## 2022-09-29 PROCEDURE — 86901 BLOOD TYPING SEROLOGIC RH(D): CPT | Performed by: SURGERY

## 2022-09-29 PROCEDURE — 80048 BASIC METABOLIC PNL TOTAL CA: CPT

## 2022-09-29 PROCEDURE — 86900 BLOOD TYPING SEROLOGIC ABO: CPT | Performed by: SURGERY

## 2022-09-29 PROCEDURE — 36415 COLL VENOUS BLD VENIPUNCTURE: CPT

## 2022-09-29 PROCEDURE — 86850 RBC ANTIBODY SCREEN: CPT | Performed by: SURGERY

## 2022-10-04 ENCOUNTER — ANESTHESIA EVENT (OUTPATIENT)
Dept: PERIOP | Facility: HOSPITAL | Age: 57
DRG: 038 | End: 2022-10-04
Payer: COMMERCIAL

## 2022-10-04 NOTE — PRE-PROCEDURE INSTRUCTIONS
Pre-Surgery Instructions:   Medication Instructions   • [place] Thyroid 120 MG tablet Instructions provided by MD   • ARMOUR THYROID 90 MG tablet Instructions provided by MD   • Ascorbic Acid (VITAMIN C) 1000 MG tablet Instructions provided by MD   • aspirin 81 MG tablet Instructions provided by MD   • B Complex Vitamins (VITAMIN B COMPLEX PO) Instructions provided by MD   • calcium carbonate-vitamin D (OSCAL-D) 500 mg-200 units per tablet Instructions provided by MD   • hydrochlorothiazide (HYDRODIURIL) 25 mg tablet Instructions provided by MD   • ibuprofen (MOTRIN) 200 mg tablet Instructions provided by MD   • irbesartan (AVAPRO) 150 mg tablet Instructions provided by MD   • irbesartan (AVAPRO) 300 mg tablet Instructions provided by MD   • Multiple Vitamin (MULTIVITAMIN ADULT PO) Instructions provided by MD   Covid screening negative as per patient  Reviewed [facility]'s masking policy  Patient understands  Reviewed showering and medication instructions  Take medications morning of surgery with a small sip of water  Patient verbalized understanding  Advised NPO after MN and ASC will call with scheduled surgical time

## 2022-10-08 DIAGNOSIS — I65.21 STENOSIS OF RIGHT CAROTID ARTERY: ICD-10-CM

## 2022-10-08 DIAGNOSIS — I65.21 ASYMPTOMATIC STENOSIS OF RIGHT CAROTID ARTERY: ICD-10-CM

## 2022-10-08 LAB — SARS-COV-2 RNA RESP QL NAA+PROBE: NEGATIVE

## 2022-10-08 PROCEDURE — 87635 SARS-COV-2 COVID-19 AMP PRB: CPT | Performed by: SURGERY

## 2022-10-13 ENCOUNTER — APPOINTMENT (OUTPATIENT)
Dept: NON INVASIVE DIAGNOSTICS | Facility: HOSPITAL | Age: 57
DRG: 038 | End: 2022-10-13
Payer: COMMERCIAL

## 2022-10-13 ENCOUNTER — ANESTHESIA (OUTPATIENT)
Dept: PERIOP | Facility: HOSPITAL | Age: 57
DRG: 038 | End: 2022-10-13
Payer: COMMERCIAL

## 2022-10-13 ENCOUNTER — HOSPITAL ENCOUNTER (INPATIENT)
Facility: HOSPITAL | Age: 57
LOS: 1 days | Discharge: HOME | DRG: 038 | End: 2022-10-14
Attending: SURGERY | Admitting: SURGERY
Payer: COMMERCIAL

## 2022-10-13 DIAGNOSIS — I65.21 CAROTID STENOSIS, RIGHT: ICD-10-CM

## 2022-10-13 PROBLEM — E66.9 OBESE: Status: ACTIVE | Noted: 2022-10-13

## 2022-10-13 LAB
ABO GROUP BLD: NORMAL
BASE EXCESS BLDA CALC-SCNC: 1 MMOL/L (ref -2–3)
CA-I BLD-SCNC: 1.13 MMOL/L (ref 1.12–1.32)
FOLATE SERPL-MCNC: >20 NG/ML (ref 3.1–17.5)
GLUCOSE SERPL-MCNC: 116 MG/DL (ref 65–140)
HCO3 BLDA-SCNC: 26.2 MMOL/L (ref 22–28)
HCT VFR BLD CALC: 25 % (ref 34.8–46.1)
HGB BLD-MCNC: 9.8 G/DL (ref 11.5–15.4)
HGB BLDA-MCNC: 8.5 G/DL (ref 11.5–15.4)
KCT BLD-ACNC: 237 SEC (ref 89–137)
KCT BLD-ACNC: 237 SEC (ref 89–137)
PCO2 BLD: 28 MMOL/L (ref 21–32)
PCO2 BLD: 43.4 MM HG (ref 36–44)
PH BLD: 7.39 [PH] (ref 7.35–7.45)
PO2 BLD: 229 MM HG (ref 75–129)
POTASSIUM BLD-SCNC: 3.1 MMOL/L (ref 3.5–5.3)
RH BLD: NEGATIVE
SAO2 % BLD FROM PO2: 100 % (ref 60–85)
SODIUM BLD-SCNC: 138 MMOL/L (ref 136–145)
SPECIMEN SOURCE: ABNORMAL
TSH SERPL DL<=0.05 MIU/L-ACNC: 1.54 UIU/ML (ref 0.45–4.5)
VIT B12 SERPL-MCNC: <60 PG/ML (ref 100–900)

## 2022-10-13 PROCEDURE — 82746 ASSAY OF FOLIC ACID SERUM: CPT | Performed by: INTERNAL MEDICINE

## 2022-10-13 PROCEDURE — 35301 RECHANNELING OF ARTERY: CPT | Performed by: PHYSICIAN ASSISTANT

## 2022-10-13 PROCEDURE — 82947 ASSAY GLUCOSE BLOOD QUANT: CPT

## 2022-10-13 PROCEDURE — 84132 ASSAY OF SERUM POTASSIUM: CPT

## 2022-10-13 PROCEDURE — 03CH0ZZ EXTIRPATION OF MATTER FROM RIGHT COMMON CAROTID ARTERY, OPEN APPROACH: ICD-10-PCS | Performed by: SURGERY

## 2022-10-13 PROCEDURE — 84295 ASSAY OF SERUM SODIUM: CPT

## 2022-10-13 PROCEDURE — NC001 PR NO CHARGE: Performed by: SURGERY

## 2022-10-13 PROCEDURE — C1781 MESH (IMPLANTABLE): HCPCS | Performed by: SURGERY

## 2022-10-13 PROCEDURE — 84443 ASSAY THYROID STIM HORMONE: CPT | Performed by: INTERNAL MEDICINE

## 2022-10-13 PROCEDURE — 99024 POSTOP FOLLOW-UP VISIT: CPT | Performed by: SURGERY

## 2022-10-13 PROCEDURE — 82330 ASSAY OF CALCIUM: CPT

## 2022-10-13 PROCEDURE — 85347 COAGULATION TIME ACTIVATED: CPT

## 2022-10-13 PROCEDURE — 85014 HEMATOCRIT: CPT

## 2022-10-13 PROCEDURE — 82803 BLOOD GASES ANY COMBINATION: CPT

## 2022-10-13 PROCEDURE — 35301 RECHANNELING OF ARTERY: CPT | Performed by: SURGERY

## 2022-10-13 PROCEDURE — 82607 VITAMIN B-12: CPT | Performed by: INTERNAL MEDICINE

## 2022-10-13 PROCEDURE — 93882 EXTRACRANIAL UNI/LTD STUDY: CPT

## 2022-10-13 PROCEDURE — 03UH0JZ SUPPLEMENT RIGHT COMMON CAROTID ARTERY WITH SYNTHETIC SUBSTITUTE, OPEN APPROACH: ICD-10-PCS | Performed by: SURGERY

## 2022-10-13 PROCEDURE — 85018 HEMOGLOBIN: CPT | Performed by: INTERNAL MEDICINE

## 2022-10-13 PROCEDURE — 99222 1ST HOSP IP/OBS MODERATE 55: CPT | Performed by: INTERNAL MEDICINE

## 2022-10-13 DEVICE — XENOSURE BIOLOGIC PATCH, 0.8CM X 8CM, EIFU
Type: IMPLANTABLE DEVICE | Site: CAROTID | Status: FUNCTIONAL
Brand: XENOSURE BIOLOGIC PATCH

## 2022-10-13 RX ORDER — SODIUM CHLORIDE 9 MG/ML
INJECTION, SOLUTION INTRAVENOUS CONTINUOUS PRN
Status: DISCONTINUED | OUTPATIENT
Start: 2022-10-13 | End: 2022-10-13

## 2022-10-13 RX ORDER — EPHEDRINE SULFATE 50 MG/ML
INJECTION INTRAVENOUS AS NEEDED
Status: DISCONTINUED | OUTPATIENT
Start: 2022-10-13 | End: 2022-10-13

## 2022-10-13 RX ORDER — CHLORHEXIDINE GLUCONATE 0.12 MG/ML
15 RINSE ORAL EVERY 12 HOURS SCHEDULED
Status: DISCONTINUED | OUTPATIENT
Start: 2022-10-13 | End: 2022-10-13 | Stop reason: HOSPADM

## 2022-10-13 RX ORDER — ONDANSETRON 2 MG/ML
INJECTION INTRAMUSCULAR; INTRAVENOUS AS NEEDED
Status: DISCONTINUED | OUTPATIENT
Start: 2022-10-13 | End: 2022-10-13

## 2022-10-13 RX ORDER — NAPROXEN 250 MG/1
500 TABLET ORAL AS NEEDED
COMMUNITY
End: 2022-10-14

## 2022-10-13 RX ORDER — ASPIRIN 81 MG/1
81 TABLET ORAL DAILY
Status: DISCONTINUED | OUTPATIENT
Start: 2022-10-14 | End: 2022-10-14 | Stop reason: HOSPADM

## 2022-10-13 RX ORDER — CHLORHEXIDINE GLUCONATE 0.12 MG/ML
15 RINSE ORAL ONCE
Status: COMPLETED | OUTPATIENT
Start: 2022-10-13 | End: 2022-10-13

## 2022-10-13 RX ORDER — ONDANSETRON 2 MG/ML
4 INJECTION INTRAMUSCULAR; INTRAVENOUS ONCE AS NEEDED
Status: DISCONTINUED | OUTPATIENT
Start: 2022-10-13 | End: 2022-10-13 | Stop reason: HOSPADM

## 2022-10-13 RX ORDER — OXYCODONE HYDROCHLORIDE 5 MG/1
2.5 TABLET ORAL EVERY 4 HOURS PRN
Status: DISCONTINUED | OUTPATIENT
Start: 2022-10-13 | End: 2022-10-14 | Stop reason: HOSPADM

## 2022-10-13 RX ORDER — OXYCODONE HYDROCHLORIDE 5 MG/1
5 TABLET ORAL EVERY 4 HOURS PRN
Status: DISCONTINUED | OUTPATIENT
Start: 2022-10-13 | End: 2022-10-14 | Stop reason: HOSPADM

## 2022-10-13 RX ORDER — PROTAMINE SULFATE 10 MG/ML
INJECTION, SOLUTION INTRAVENOUS AS NEEDED
Status: DISCONTINUED | OUTPATIENT
Start: 2022-10-13 | End: 2022-10-13

## 2022-10-13 RX ORDER — CLOPIDOGREL BISULFATE 75 MG/1
75 TABLET ORAL DAILY
Status: DISCONTINUED | OUTPATIENT
Start: 2022-10-14 | End: 2022-10-14 | Stop reason: HOSPADM

## 2022-10-13 RX ORDER — PROMETHAZINE HYDROCHLORIDE 25 MG/ML
6.25 INJECTION, SOLUTION INTRAMUSCULAR; INTRAVENOUS ONCE AS NEEDED
Status: DISCONTINUED | OUTPATIENT
Start: 2022-10-13 | End: 2022-10-13 | Stop reason: HOSPADM

## 2022-10-13 RX ORDER — ROCURONIUM BROMIDE 10 MG/ML
INJECTION, SOLUTION INTRAVENOUS AS NEEDED
Status: DISCONTINUED | OUTPATIENT
Start: 2022-10-13 | End: 2022-10-13

## 2022-10-13 RX ORDER — SODIUM CHLORIDE 9 MG/ML
125 INJECTION, SOLUTION INTRAVENOUS CONTINUOUS
Status: DISCONTINUED | OUTPATIENT
Start: 2022-10-13 | End: 2022-10-13

## 2022-10-13 RX ORDER — MAGNESIUM HYDROXIDE 1200 MG/15ML
LIQUID ORAL AS NEEDED
Status: DISCONTINUED | OUTPATIENT
Start: 2022-10-13 | End: 2022-10-13 | Stop reason: HOSPADM

## 2022-10-13 RX ORDER — HYDRALAZINE HYDROCHLORIDE 20 MG/ML
15 INJECTION INTRAMUSCULAR; INTRAVENOUS
Status: DISCONTINUED | OUTPATIENT
Start: 2022-10-13 | End: 2022-10-14 | Stop reason: HOSPADM

## 2022-10-13 RX ORDER — PROPOFOL 10 MG/ML
INJECTION, EMULSION INTRAVENOUS AS NEEDED
Status: DISCONTINUED | OUTPATIENT
Start: 2022-10-13 | End: 2022-10-13

## 2022-10-13 RX ORDER — ONDANSETRON 2 MG/ML
4 INJECTION INTRAMUSCULAR; INTRAVENOUS EVERY 6 HOURS PRN
Status: DISCONTINUED | OUTPATIENT
Start: 2022-10-13 | End: 2022-10-14 | Stop reason: HOSPADM

## 2022-10-13 RX ORDER — FENTANYL CITRATE 50 UG/ML
INJECTION, SOLUTION INTRAMUSCULAR; INTRAVENOUS AS NEEDED
Status: DISCONTINUED | OUTPATIENT
Start: 2022-10-13 | End: 2022-10-13

## 2022-10-13 RX ORDER — ACETAMINOPHEN 325 MG/1
650 TABLET ORAL EVERY 6 HOURS PRN
Status: DISCONTINUED | OUTPATIENT
Start: 2022-10-13 | End: 2022-10-14 | Stop reason: HOSPADM

## 2022-10-13 RX ORDER — CEFAZOLIN SODIUM 2 G/50ML
2000 SOLUTION INTRAVENOUS ONCE
Status: COMPLETED | OUTPATIENT
Start: 2022-10-13 | End: 2022-10-13

## 2022-10-13 RX ORDER — HEPARIN SODIUM 5000 [USP'U]/ML
5000 INJECTION, SOLUTION INTRAVENOUS; SUBCUTANEOUS EVERY 8 HOURS SCHEDULED
Status: DISCONTINUED | OUTPATIENT
Start: 2022-10-14 | End: 2022-10-14 | Stop reason: HOSPADM

## 2022-10-13 RX ORDER — NEOSTIGMINE METHYLSULFATE 1 MG/ML
INJECTION INTRAVENOUS AS NEEDED
Status: DISCONTINUED | OUTPATIENT
Start: 2022-10-13 | End: 2022-10-13

## 2022-10-13 RX ORDER — LIDOCAINE HYDROCHLORIDE 10 MG/ML
INJECTION, SOLUTION EPIDURAL; INFILTRATION; INTRACAUDAL; PERINEURAL AS NEEDED
Status: DISCONTINUED | OUTPATIENT
Start: 2022-10-13 | End: 2022-10-13 | Stop reason: HOSPADM

## 2022-10-13 RX ORDER — HYDROMORPHONE HCL/PF 1 MG/ML
0.5 SYRINGE (ML) INJECTION
Status: DISCONTINUED | OUTPATIENT
Start: 2022-10-13 | End: 2022-10-13 | Stop reason: HOSPADM

## 2022-10-13 RX ORDER — FENTANYL CITRATE/PF 50 MCG/ML
50 SYRINGE (ML) INJECTION
Status: DISCONTINUED | OUTPATIENT
Start: 2022-10-13 | End: 2022-10-13 | Stop reason: HOSPADM

## 2022-10-13 RX ORDER — HEPARIN SODIUM 1000 [USP'U]/ML
INJECTION, SOLUTION INTRAVENOUS; SUBCUTANEOUS AS NEEDED
Status: DISCONTINUED | OUTPATIENT
Start: 2022-10-13 | End: 2022-10-13

## 2022-10-13 RX ORDER — LIDOCAINE HYDROCHLORIDE 20 MG/ML
INJECTION, SOLUTION EPIDURAL; INFILTRATION; INTRACAUDAL; PERINEURAL AS NEEDED
Status: DISCONTINUED | OUTPATIENT
Start: 2022-10-13 | End: 2022-10-13

## 2022-10-13 RX ORDER — ATORVASTATIN CALCIUM 20 MG/1
20 TABLET, FILM COATED ORAL
Status: DISCONTINUED | OUTPATIENT
Start: 2022-10-13 | End: 2022-10-14 | Stop reason: HOSPADM

## 2022-10-13 RX ORDER — MEPERIDINE HYDROCHLORIDE 25 MG/ML
12.5 INJECTION INTRAMUSCULAR; INTRAVENOUS; SUBCUTANEOUS ONCE AS NEEDED
Status: DISCONTINUED | OUTPATIENT
Start: 2022-10-13 | End: 2022-10-13 | Stop reason: HOSPADM

## 2022-10-13 RX ORDER — GLYCOPYRROLATE 0.2 MG/ML
INJECTION INTRAMUSCULAR; INTRAVENOUS AS NEEDED
Status: DISCONTINUED | OUTPATIENT
Start: 2022-10-13 | End: 2022-10-13

## 2022-10-13 RX ORDER — DEXAMETHASONE SODIUM PHOSPHATE 10 MG/ML
INJECTION, SOLUTION INTRAMUSCULAR; INTRAVENOUS AS NEEDED
Status: DISCONTINUED | OUTPATIENT
Start: 2022-10-13 | End: 2022-10-13

## 2022-10-13 RX ORDER — LEVOTHYROXINE SODIUM 0.1 MG/1
300 TABLET ORAL
Status: DISCONTINUED | OUTPATIENT
Start: 2022-10-14 | End: 2022-10-14 | Stop reason: HOSPADM

## 2022-10-13 RX ORDER — LABETALOL HYDROCHLORIDE 5 MG/ML
5 INJECTION, SOLUTION INTRAVENOUS
Status: DISCONTINUED | OUTPATIENT
Start: 2022-10-13 | End: 2022-10-14 | Stop reason: HOSPADM

## 2022-10-13 RX ADMIN — CEFAZOLIN SODIUM 2000 MG: 2 SOLUTION INTRAVENOUS at 08:32

## 2022-10-13 RX ADMIN — NEOSTIGMINE METHYLSULFATE 2 MG: 1 INJECTION INTRAVENOUS at 10:57

## 2022-10-13 RX ADMIN — DEXAMETHASONE SODIUM PHOSPHATE 10 MG: 10 INJECTION, SOLUTION INTRAMUSCULAR; INTRAVENOUS at 08:33

## 2022-10-13 RX ADMIN — EPHEDRINE SULFATE 5 MG: 50 INJECTION, SOLUTION INTRAVENOUS at 09:47

## 2022-10-13 RX ADMIN — REMIFENTANIL HYDROCHLORIDE 0.1 MCG/KG/MIN: 1 INJECTION, POWDER, LYOPHILIZED, FOR SOLUTION INTRAVENOUS at 08:23

## 2022-10-13 RX ADMIN — PROPOFOL 200 MG: 10 INJECTION, EMULSION INTRAVENOUS at 08:14

## 2022-10-13 RX ADMIN — OXYCODONE 5 MG: 5 TABLET ORAL at 20:38

## 2022-10-13 RX ADMIN — FENTANYL CITRATE 50 MCG: 50 INJECTION, SOLUTION INTRAMUSCULAR; INTRAVENOUS at 08:14

## 2022-10-13 RX ADMIN — HEPARIN SODIUM 8000 UNITS: 1000 INJECTION INTRAVENOUS; SUBCUTANEOUS at 09:40

## 2022-10-13 RX ADMIN — ATORVASTATIN CALCIUM 20 MG: 20 TABLET, FILM COATED ORAL at 17:05

## 2022-10-13 RX ADMIN — CHLORHEXIDINE GLUCONATE 0.12% ORAL RINSE 15 ML: 1.2 LIQUID ORAL at 07:18

## 2022-10-13 RX ADMIN — SODIUM CHLORIDE 125 ML/HR: 0.9 INJECTION, SOLUTION INTRAVENOUS at 07:16

## 2022-10-13 RX ADMIN — GLYCOPYRROLATE 0.2 MG: 0.2 INJECTION, SOLUTION INTRAMUSCULAR; INTRAVENOUS at 09:24

## 2022-10-13 RX ADMIN — LIDOCAINE HYDROCHLORIDE 80 MG: 20 INJECTION, SOLUTION EPIDURAL; INFILTRATION; INTRACAUDAL; PERINEURAL at 08:14

## 2022-10-13 RX ADMIN — FENTANYL CITRATE 50 MCG: 50 INJECTION, SOLUTION INTRAMUSCULAR; INTRAVENOUS at 08:58

## 2022-10-13 RX ADMIN — REMIFENTANIL HYDROCHLORIDE 0.12 MCG/KG/MIN: 1 INJECTION, POWDER, LYOPHILIZED, FOR SOLUTION INTRAVENOUS at 09:48

## 2022-10-13 RX ADMIN — FENTANYL CITRATE 50 MCG: 50 INJECTION, SOLUTION INTRAMUSCULAR; INTRAVENOUS at 11:23

## 2022-10-13 RX ADMIN — LABETALOL HYDROCHLORIDE 5 MG: 5 INJECTION, SOLUTION INTRAVENOUS at 20:14

## 2022-10-13 RX ADMIN — HEPARIN SODIUM 2000 UNITS: 1000 INJECTION INTRAVENOUS; SUBCUTANEOUS at 09:55

## 2022-10-13 RX ADMIN — SODIUM CHLORIDE: 0.9 INJECTION, SOLUTION INTRAVENOUS at 08:27

## 2022-10-13 RX ADMIN — ONDANSETRON 4 MG: 2 INJECTION INTRAMUSCULAR; INTRAVENOUS at 10:57

## 2022-10-13 RX ADMIN — GLYCOPYRROLATE 0.2 MG: 0.2 INJECTION, SOLUTION INTRAMUSCULAR; INTRAVENOUS at 10:57

## 2022-10-13 RX ADMIN — FENTANYL CITRATE 50 MCG: 50 INJECTION, SOLUTION INTRAMUSCULAR; INTRAVENOUS at 12:07

## 2022-10-13 RX ADMIN — PHENYLEPHRINE HYDROCHLORIDE 30 MCG/MIN: 10 INJECTION INTRAVENOUS at 08:31

## 2022-10-13 RX ADMIN — FENTANYL CITRATE 50 MCG: 50 INJECTION, SOLUTION INTRAMUSCULAR; INTRAVENOUS at 11:33

## 2022-10-13 RX ADMIN — PROTAMINE SULFATE 30 MG: 10 INJECTION, SOLUTION INTRAVENOUS at 10:54

## 2022-10-13 RX ADMIN — OXYCODONE 5 MG: 5 TABLET ORAL at 12:59

## 2022-10-13 RX ADMIN — EPHEDRINE SULFATE 5 MG: 50 INJECTION, SOLUTION INTRAVENOUS at 09:02

## 2022-10-13 RX ADMIN — SODIUM CHLORIDE: 0.9 INJECTION, SOLUTION INTRAVENOUS at 11:10

## 2022-10-13 RX ADMIN — ROCURONIUM BROMIDE 50 MG: 10 SOLUTION INTRAVENOUS at 08:15

## 2022-10-13 NOTE — ASSESSMENT & PLAN NOTE
S/P right carotid endarterectomy with bovine patch angioplasty  Plan  Close hemodynamic monitoring  BP goal -160 mmHg  PRN labetalol, hydralazine, nicardipine for SBP>160mmHg  IV fluids for SBP<100 mmHg  Continue Asprin, atorvastatin, clopidogrel

## 2022-10-13 NOTE — LETTER
Dear Dr Huma Goyal,  This is regarding Ronan Casarez, 62year old who is under your care  She is admitted to [place] [address] for her right carotid endarterectomy  We found out that her vit  B 12 levels are low  We also ordered intrinsic factor antibodies  Could you please follow up on her vitamin B12 deficiency  Thank you

## 2022-10-13 NOTE — H&P
H & P    Plan: R CEA  Operative site marked  Ne new changes  /86   Pulse 94   Temp 97 9 °F (36 6 °C) (Temporal)   Resp 16   Ht 5' 3" (1 6 m)   Wt 81 8 kg (180 lb 5 4 oz)   SpO2 100%   BMI 31 95 kg/m²         Assessment/Plan:     Carotid stenosis, asymptomatic, bilateral  Asymptomatic high-grade right carotid stenosis      Patient returns to the office review CTA of the neck  There is focal dense atherosclerotic plaque at the bifurcation/proximal internal carotid artery on the right resulting in a high-grade stenosis  We discussed the options of stenting versus standard endarterectomy  Due to vessel tortuosity and calcific plaque I am concerned that stent may not fully expand  Recommend right carotid endarterectomy  Discuss with patient and  that her lesion is somewhat high and tortuous and may certainly pose challenges to dissection/exposure  The procedure for right carotid endarterectomy (eversion endarterectomy), benefits, risks, and anticipated postop course discussed in detail  All questions answered to her satisfaction  Patient agrees to proceed  Written consent was obtained  Of note patient was also noted to have a 5 5 mm aneurysm of the anterior communicating artery  She is scheduled see Dr Khalif Quintero in late September  Will plan on right carotid endarterectomy in early October          Diagnoses and all orders for this visit:     Carotid stenosis, asymptomatic, bilateral            Subjective:       Patient ID: Clem Puentes is a 64 y o  female      Patient is here today to review results of CTA neck w/wo contrast done 8/8/2022  He had a CV done 5/16/2022  Patient c/o dizziness  She denies any headaches, sudden loss of vision, trouble swallowing, slurred speech, TIA or Stroke symptoms  Patient is taking ASA 81 mg daily  She is a former smoker    Quiana Patino returns to the office to discuss results of CTA neck    No new focal neurologic changes in the interim         The following portions of the patient's history were reviewed and updated as appropriate: allergies, current medications, past family history, past medical history, past social history, past surgical history and problem list      Review of Systems   Constitutional: Positive for fatigue  HENT: Negative  Eyes: Negative  Respiratory: Negative  Cardiovascular: Positive for leg swelling  Gastrointestinal: Negative  Endocrine: Negative  Genitourinary: Positive for frequency  Musculoskeletal: Positive for arthralgias  Skin: Negative  Allergic/Immunologic: Negative  Neurological: Positive for dizziness  Hematological: Bruises/bleeds easily  Psychiatric/Behavioral: Negative  I have personally reviewed the ROS entered by MA and agree as documented      Objective:        /80 (BP Location: Left arm, Patient Position: Sitting, Cuff Size: Standard)   Pulse 77   Ht 5' 3" (1 6 m)   Wt 83 6 kg (184 lb 6 4 oz)   SpO2 98%   BMI 32 66 kg/m²             Physical Exam  Constitutional:       General: She is not in acute distress  Appearance: She is well-developed  HENT:      Head: Normocephalic and atraumatic  Eyes:      General: No scleral icterus  Conjunctiva/sclera: Conjunctivae normal    Neck:      Trachea: No tracheal deviation  Cardiovascular:      Rate and Rhythm: Normal rate and regular rhythm  Heart sounds: Normal heart sounds  Pulmonary:      Effort: Pulmonary effort is normal       Breath sounds: Normal breath sounds  Abdominal:      General: There is no distension  Palpations: Abdomen is soft  There is no mass (no appreciable aortic pulsation/aneurysm)  Tenderness: There is no abdominal tenderness  There is no guarding or rebound  Musculoskeletal:         General: Normal range of motion  Cervical back: Normal range of motion and neck supple  Skin:     General: Skin is warm and dry     Neurological:      Mental Status: She is alert and oriented to person, place, and time  Psychiatric:         Mood and Affect: Mood normal          Behavior: Behavior normal          Thought Content:  Thought content normal          Judgment: Judgment normal         CTA neck:  IMPRESSION:     1   Estimated 83% atherosclerotic stenosis at the origin of left cervical ICA      2   No significant stenosis of left cervical ICA      3   Incidentally noted anterosuperiorly 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      4   Anatomic variant retropharyngeal course of bilateral cervical internal carotid arteries      5   Mild sinus disease

## 2022-10-13 NOTE — ANESTHESIA PROCEDURE NOTES
Arterial Line Insertion  Performed by: Dia De La Fuente CRNA  Authorized by: Sofiya Matthews DO   Consent: Written consent obtained  Risks and benefits: risks, benefits and alternatives were discussed  Consent given by: patient  Patient identity confirmed: arm band  Time out: Immediately prior to procedure a "time out" was called to verify the correct patient, procedure, equipment, support staff and site/side marked as required  Preparation: Patient was prepped and draped in the usual sterile fashion    Indications: hemodynamic monitoring  Orientation:  Left  Location: ulnar artery  Procedure Details:  Needle gauge: 20  Number of attempts: 1    Post-procedure:  Post-procedure: dressing applied  Waveform: good waveform and waveform confirmed  Post-procedure CNS: normal  Patient tolerance: Patient tolerated the procedure well with no immediate complications and patient tolerated the procedure well with no immediate complications

## 2022-10-13 NOTE — CONSULTS
[facility] 1965, 62 y o  female MRN: [id number]  Unit/Bed#: ICU 02 Encounter: [id number]  Primary Care Provider: Petra Stokes DO (Inactive)   Date and time admitted to hospital: 10/13/2022  5:56 AM        * Carotid stenosis, right  Assessment & Plan  S/P right carotid endarterectomy with bovine patch angioplasty  Plan  Close hemodynamic monitoring  BP goal -160 mmHg  PRN labetalol, hydralazine, nicardipine for SBP>160mmHg  IV fluids for SBP<100 mmHg  Continue Asprin, atorvastatin, clopidogrel  HTN (hypertension)  Assessment & Plan  Plan  SBP goal 100-160  Hypothyroid  Assessment & Plan  Plan  Continue Levothyroxine 300 mcg  Hyperlipidemia  Assessment & Plan  Plan  Continue atorvastatin  Obese  Assessment & Plan  Plan  Counseling on life style modifications  Physician Requesting Consult: Allan Loaiza DO     Reason for Consultation / Chief Complaint:      History of Present Illness:  Shirley Scott is a 62 y o  female with PMH of bilateral carotid artery stenosis, primary HT, ant com artery aneurysm and hypothyroidism  She presents for close hemodynamic monitoring following right carotid endarterectomy  Patient is alert and orient  Denies pain  No active bleeding, No hematoma  Vitals stable  BP is within desirable range  History obtained from chart review and the patient       Past Medical History:  Past Medical History:   Diagnosis Date   • Aneurysm ([facility] [address] )     brain   • Arthritis    • Breast cancer ([facility] [address] ) 2001   • Disease of thyroid gland    • Dizziness    • Hyperlipidemia    • Hypertension    • Hypothyroid    • Myocardial infarction [facility])    • Vitiligo         Past Surgical History:  Past Surgical History:   Procedure Laterality Date   • BREAST CYST EXCISION Left 2001    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   • BREAST LUMPECTOMY Left 04/2001    DCIS   • BREAST SURGERY     • COLONOSCOPY     • HIP SURGERY Right     1966, 1975, 1976         Past Family History:  Family History   Problem Relation Age of Onset   • Other Mother         Carotid stenosis, bilateral    • Heart disease Mother    • Hypertension Mother    • Lung cancer Father         smoker   • Liver cancer Father    • Hypertension Father    • No Known Problems Sister    • No Known Problems Sister    • Heart attack Maternal Grandmother    • Thyroid disease Maternal Grandmother    • No Known Problems Maternal Grandfather    • No Known Problems Paternal Grandmother    • No Known Problems Paternal Grandfather    • Hypertension Brother    • No Known Problems Brother    • No Known Problems Maternal Aunt    • No Known Problems Maternal Aunt    • Cancer Paternal Aunt         Social History:  E-Cigarette/Vaping   • E-Cigarette Use Never User      E-Cigarette/Vaping Substances   • Nicotine No    • THC No    • CBD No    • Flavoring No    • Other No    • Unknown No      Social History     Tobacco Use   Smoking Status Former Smoker   Smokeless Tobacco Never Used   Tobacco Comment    Smoked 20yrs up to 1ppd  Quit in 2008     Social History     Substance and Sexual Activity   Alcohol Use Yes    Comment: Occasional      Social History     Substance and Sexual Activity   Drug Use Never     Marital Status: /Civil Union     Home Medications:   Prior to Admission medications    Medication Sig Start Date End Date Taking?  Authorizing Provider   Chelsea Thyroid 120 MG tablet Take 120 mg by mouth every other day 4/28/22  Yes Historical Provider, MD VELÁSQUEZ THYROID 90 MG tablet TAKE 1 TABLET DAILY  Patient taking differently: Take 90 mg by mouth every other day 2/26/20  Yes Nona Yancey DO   Ascorbic Acid (VITAMIN C) 1000 MG tablet Take 500 mg by mouth daily   Yes Historical Provider, MD   aspirin 81 MG tablet Take 1 tablet by mouth daily 7/24/17  Yes Historical Provider, MD   B Complex Vitamins (VITAMIN B COMPLEX PO) B Complex   Yes Historical Provider, MD   calcium carbonate-vitamin D (OSCAL-D) 500 mg-200 units per tablet Take 1 tablet by mouth 2 (two) times a day with meals   Yes Historical Provider, MD   hydrochlorothiazide (HYDRODIURIL) 25 mg tablet Take 25 mg by mouth daily   Yes Historical Provider, MD   ibuprofen (MOTRIN) 200 mg tablet Take 800 mg by mouth every 6 (six) hours as needed for mild pain   Yes Historical Provider, MD   irbesartan (AVAPRO) 150 mg tablet Take 150 mg by mouth every other day Alternating with 300mg dose   Yes Historical Provider, MD   irbesartan (AVAPRO) 300 mg tablet Take 300 mg by mouth every other day 4/22/22  Yes Historical Provider, MD   Multiple Vitamin (MULTIVITAMIN ADULT PO) Take by mouth   Yes Historical Provider, MD   naproxen (NAPROSYN) 250 mg tablet Take 500 mg by mouth if needed for mild pain   Yes Historical Provider, MD   amoxicillin (AMOXIL) 500 MG tablet Take 500 mg by mouth 2 (two) times a day  Patient not taking: No sig reported    Historical Provider, MD        Inpatient Medications:  Scheduled Meds:  Current Facility-Administered Medications   Medication Dose Route Frequency Provider Last Rate   • acetaminophen  650 mg Oral Q6H PRN Tia Gardner PA-C     • [START ON 10/14/2022] aspirin  81 mg Oral Daily ASHLEY DelarosaC     • atorvastatin  20 mg Oral Daily With Dinner ASHLEY DelarosaC     • [START ON 10/14/2022] clopidogrel  75 mg Oral Daily Tia Gardner PA-C     • [START ON 10/14/2022] heparin (porcine)  5,000 Units Subcutaneous [facility] KAI Delarosa-C     • labetalol  5 mg Intravenous Q15 Min PRN Tia Gardner PA-C      And   • hydrALAZINE  15 mg Intravenous Q15 Min PRN Tia Gardner PA-C      And   • niCARdipine  1-15 mg/hr Intravenous Continuous PRN Tia Gardner PA-C     • [START ON 10/14/2022] levothyroxine  300 mcg Oral Early Morning Tia Gardner PA-C     • ondansetron  4 mg Intravenous Q6H PRN Tia Gardner PA-C     • oxyCODONE  2 5 mg Oral Q4H PRN Margene Claude, PA-C     • oxyCODONE  5 mg Oral Q4H PRN Margene Claude, PA-C     • sodium chloride  500 mL Intravenous Once PRN Margene Claude, PA-C      Followed by   • [START ON 10/15/2022] phenylephine   mcg/min Intravenous Continuous PRN Margene Claude, PA-C       Continuous Infusions:niCARdipine, 1-15 mg/hr  [START ON 10/15/2022] phenylephine,  mcg/min      PRN Meds:  acetaminophen, 650 mg, Q6H PRN  labetalol, 5 mg, Q15 Min PRN   And  hydrALAZINE, 15 mg, Q15 Min PRN   And  niCARdipine, 1-15 mg/hr, Continuous PRN  ondansetron, 4 mg, Q6H PRN  oxyCODONE, 2 5 mg, Q4H PRN  oxyCODONE, 5 mg, Q4H PRN  sodium chloride, 500 mL, Once PRN   Followed by  [START ON 10/15/2022] phenylephine,  mcg/min, Continuous PRN         Allergies: Allergies   Allergen Reactions   • Latex Rash   • Other Rash     Adhesives        ROS:   Review of Systems   Constitutional: Negative for fever  HENT: Negative for sore throat  Respiratory: Negative for cough, chest tightness and shortness of breath  Cardiovascular: Negative for chest pain, palpitations and leg swelling  Gastrointestinal: Negative for abdominal pain, diarrhea, nausea and vomiting  Skin: Negative for color change  Neurological: Negative for dizziness, seizures, syncope, facial asymmetry, speech difficulty, weakness, light-headedness and headaches  Psychiatric/Behavioral: Negative for agitation, behavioral problems and confusion  Vitals:  Vitals:    10/13/22 1259 10/13/22 1300 10/13/22 1330 10/13/22 1400   BP:  122/83     Pulse:  94 84 96   Resp:     Temp:       TempSrc:       SpO2:  93% 100% 94%   Weight: 83 8 kg (184 lb 11 9 oz)      Height: 5' 3" (1 6 m)        Temperature:   Temp (24hrs), Av 9 °F (36 6 °C), Min:97 °F (36 1 °C), Max:98 8 °F (37 1 °C)    Current Temperature: (!) 97 °F (36 1 °C)     Weights:      Body mass index is 32 73 kg/m²       Hemodynamic Monitoring:  PAP:  , PAP mean:   , CVP:  , PCWP:   , SvO2:   , ScvO2:  , CO:  , CI:  , SVR:  , SVRI:  , PVR:  , SV:  , SVI:  , ICP Mean:  , CPP:       Non-Invasive/Invasive Ventilation Settings:  Respiratory  Report   Lab Data (Last 4 hours)    None         O2/Vent Data (Last 4 hours)    None              No results found for: PHART, COU8GAC, PO2ART, CZY8FUJ, J0LQJING, BEART, SOURCE  SpO2: SpO2: 94 %     Physical Exam:  Physical Exam  Constitutional:       General: She is not in acute distress  Appearance: She is not ill-appearing  HENT:      Head: Normocephalic  Mouth/Throat:      Mouth: Mucous membranes are moist       Pharynx: Oropharynx is clear  Eyes:      Conjunctiva/sclera: Conjunctivae normal    Cardiovascular:      Rate and Rhythm: Normal rate and regular rhythm  Pulses: Normal pulses  Heart sounds: Normal heart sounds  Pulmonary:      Effort: Pulmonary effort is normal       Breath sounds: Normal breath sounds  Abdominal:      Palpations: Abdomen is soft  Skin:     General: Skin is warm and dry  Capillary Refill: Capillary refill takes less than 2 seconds  Neurological:      General: No focal deficit present  Mental Status: She is alert and oriented to person, place, and time  Psychiatric:         Mood and Affect: Mood normal          Behavior: Behavior normal           Labs:  Results from last 7 days   Lab Units 10/13/22  0947   I STAT HEMOGLOBIN g/dl 8 5*   HEMATOCRIT, ISTAT % 25*      Results from last 7 days   Lab Units 10/13/22  0947   CO2, I-STAT mmol/L 28   GLUCOSE, ISTAT mg/dl 116                      No results found for: TROPONINI     Imaging: I have personally reviewed pertinent reports  EKG: This was personally reviewed by myself  Micro:  No results found for: Francine Sy [facility]    Plan:                  Neuro: Patient is alert and orient  Pain management adequate  Neuro checks q 1h  Elevate HOB > 30 degrees  CV: Close hemodynamic monitoring  Arterial line in place  Maintain -160 mmHg  IV fluid 500 ml bolus for hypotension  PRN antihypertensives for SBP>160 mmHg  Lung: Having saturation >92% on room air  Incentive spirometry  Encourage deep breathing and coughing  GI: dysphagia assessment prior oral intake  : I/O charts                 ID: Monitor temperature and CBC  Heme: Check CBC  Transfuse if Hb<7                   Endo: Continue levothyroxine  Msk/Skin: Look for S/S of skin breach  VTE Pharmacologic Prophylaxis: Heparin  VTE Mechanical Prophylaxis: sequential compression device     Invasive lines and devices: Invasive Devices  Report    Peripheral Intravenous Line  Duration           Peripheral IV 10/13/22 Right Wrist <1 day    Peripheral IV 10/13/22 Right;Ventral (anterior) Forearm <1 day          Arterial Line  Duration           Arterial Line 10/13/22 Left Ulnar <1 day                 Code Status: Level 1 - Full Code  POA:    POLST:       Given critical illness, patient length of stay will require greater than two midnights  Portions of the record may have been created with voice recognition software  Occasional wrong word or "sound a like" substitutions may have occurred due to the inherent limitations of voice recognition software  Read the chart carefully and recognize, using context, where substitutions have occurred          Loi Wren MD

## 2022-10-13 NOTE — ANESTHESIA PREPROCEDURE EVALUATION
Procedure:  ENDARTERECTOMY ARTERY CAROTID (Eversion) (Right Neck)    Relevant Problems   CARDIO   (+) Carotid stenosis, asymptomatic, bilateral   (+) HTN (hypertension)   (+) Hyperlipidemia      ENDO   (+) Hypothyroid      Other   (+) Obese        Physical Exam    Airway    Mallampati score: III  TM Distance: >3 FB  Neck ROM: full     Dental       Cardiovascular  Rhythm: regular, Rate: normal, Cardiovascular exam normal    Pulmonary  Pulmonary exam normal Breath sounds clear to auscultation,     Other Findings        Anesthesia Plan  ASA Score- 3     Anesthesia Type- general with ASA Monitors  Additional Monitors: arterial line  Airway Plan: ETT  Plan Factors-Exercise tolerance (METS): >4 METS  Chart reviewed  Existing labs reviewed  Patient is not a current smoker  Obstructive sleep apnea risk education given perioperatively  Induction- intravenous  Postoperative Plan-     Informed Consent- Anesthetic plan and risks discussed with patient

## 2022-10-13 NOTE — ANESTHESIA POSTPROCEDURE EVALUATION
Post-Op Assessment Note    CV Status:  Stable  Pain Score: 0    Pain management: adequate     Mental Status:  Alert and awake   Hydration Status:  Euvolemic   PONV Controlled:  Controlled   Airway Patency:  Patent      Post Op Vitals Reviewed: Yes      Staff: Anesthesiologist         No complications documented      BP      Temp     Pulse     Resp      SpO2      /83   Pulse 96   Temp (!) 97 °F (36 1 °C) (Axillary)   Resp 19   Ht 5' 3" (1 6 m)   Wt 83 8 kg (184 lb 11 9 oz)   SpO2 94%   BMI 32 73 kg/m²

## 2022-10-13 NOTE — PROGRESS NOTES
Post- OP Note - Vascular Surgery   Ever Hunger 62 y o  female MRN: [id number]  Unit/Bed#: ICU 02 Encounter: [id number]    Assessment:  62 y o  female Day of Surgery s/p Procedure(s) (LRB):  ENDARTERECTOMY ARTERY CAROTID (Eversion) WITH BOVINE PATCH ANGIOPLASTY (Right)      Plan:  • Appreciate ICU monitoring  • Blood Pressure Control per Protocol  • Asprin/plavix to start tomorrow  • Monitor Incision site for hematoma  • NV checks  • Please Tigertext on call Vascular Surgery resident with any questions    Subjective/Objective     Subjective:   Patient alert and oriented  No chest pains or shortness of breath  Some numbness at L jaw just cephalad to incision site  No Weakness  No trouble swallowing  Objective:    Blood pressure 122/83, pulse 96, temperature (!) 97 °F (36 1 °C), temperature source Axillary, resp  rate 19, height 5' 3" (1 6 m), weight 83 8 kg (184 lb 11 9 oz), SpO2 94 %  ,Body mass index is 32 73 kg/m²  Physical Exam:   Gen:  NAD  HEENT: NCAT  MMM, no hematoma  CV: well perfused  Lungs: Normal respiratory effort  Abd: soft, nt/nd  Skin: warm/ dry  Extremities: no peripheral edema, no clubbing or cyanosis  Neuro:  AxO x3, sensorimotor intact, CN 2-12 intact

## 2022-10-13 NOTE — INTERIM OP NOTE
ENDARTERECTOMY ARTERY CAROTID (Eversion) WITH BOVINE PATCH ANGIOPLASTY  Postoperative Note  PATIENT NAME: Greta Higgins  : 1965  MRN: [id number]  AL HYBRID 09    Surgery Date: 10/13/2022    Preop Diagnosis:  Asymptomatic stenosis of right carotid artery [I65 21]    Post-Op Diagnosis Codes:     * Asymptomatic stenosis of right carotid artery [I65 21]    Procedure(s) (LRB):  ENDARTERECTOMY ARTERY CAROTID (Eversion) WITH BOVINE PATCH ANGIOPLASTY (Right)    Surgeon(s) and Role:     * Jonathan Leigh DO - Primary     * Delia John PA-C - Assisting    Specimens:  * No specimens in log *    Estimated Blood Loss:   30 mL    Anesthesia Type:   General     Findings:   -Heavily calcified plaque at carotid bulb and extending into proximal ICA  -Hypoglossal and vagus nerve identified and protected throughout case  -No SSEP/EEG changes throughout case  -Heparin 10,000 units  -Protamine: 30mg  -Moving all extremities symmetrically   -intraop completion duplex: no obvious mobile flaps; adequate waveform/velocities  Complications:   None immediate apparent    Vascular Quality Initiative - Carotid Endarterectomy     Urgency:  Elective    Anesthesia: General Type: Conventional    Side: right    Patch Type: Prosthetic    If Prosthetic, Patch : Olga    Shunt: No    Skin Prep: Chlorhexidine    Drain: no  Heparin: yes    Protamine: yes      Dextran: no     Re-explore artery after closure: no    Total Procedure time: see anesthesia/nrusing record min    Monitoring:   EEG: yes   Stump Pressure: no   Other: no    Completion Study:   Doppler: no   Duplex: yes   Arteriogram: no    Concomitant Procedure:   Proximal Endovascular: no   Distal Endovascular: no   CABG: no   Other Arterial Op: no      SIGNATURE: Jonathan Leigh   DATE: 2022   TIME: 11:11 AM

## 2022-10-13 NOTE — PLAN OF CARE
Problem: CARDIOVASCULAR - ADULT  Goal: Maintains optimal cardiac output and hemodynamic stability  Description: INTERVENTIONS:  - Monitor I/O, vital signs and rhythm  - Monitor for S/S and trends of decreased cardiac output  - Administer and titrate ordered vasoactive medications to optimize hemodynamic stability  - Assess quality of pulses, skin color and temperature  - Assess for signs of decreased coronary artery perfusion  - Instruct patient to report change in severity of symptoms  Outcome: Progressing     Problem: Knowledge Deficit  Goal: Patient/family/caregiver demonstrates understanding of disease process, treatment plan, medications, and discharge instructions  Description: Complete learning assessment and assess knowledge base    Interventions:  - Provide teaching at level of understanding  - Provide teaching via preferred learning methods  Outcome: Progressing     Problem: DISCHARGE PLANNING  Goal: Discharge to home or other facility with appropriate resources  Description: INTERVENTIONS:  - Identify barriers to discharge w/patient and caregiver  - Arrange for needed discharge resources and transportation as appropriate  - Identify discharge learning needs (meds, wound care, etc )  - Arrange for interpretive services to assist at discharge as needed  - Refer to Case Management Department for coordinating discharge planning if the patient needs post-hospital services based on physician/advanced practitioner order or complex needs related to functional status, cognitive ability, or social support system  Outcome: Progressing

## 2022-10-14 ENCOUNTER — DOCUMENTATION (OUTPATIENT)
Dept: VASCULAR SURGERY | Facility: CLINIC | Age: 57
End: 2022-10-14

## 2022-10-14 VITALS
SYSTOLIC BLOOD PRESSURE: 128 MMHG | TEMPERATURE: 97.8 F | HEIGHT: 63 IN | DIASTOLIC BLOOD PRESSURE: 77 MMHG | OXYGEN SATURATION: 99 % | RESPIRATION RATE: 24 BRPM | BODY MASS INDEX: 32.73 KG/M2 | WEIGHT: 184.75 LBS | HEART RATE: 94 BPM

## 2022-10-14 LAB
ANION GAP SERPL CALCULATED.3IONS-SCNC: 6 MMOL/L (ref 4–13)
APTT PPP: 24 SECONDS (ref 23–37)
BUN SERPL-MCNC: 16 MG/DL (ref 5–25)
CALCIUM SERPL-MCNC: 8.9 MG/DL (ref 8.3–10.1)
CHLORIDE SERPL-SCNC: 104 MMOL/L (ref 96–108)
CO2 SERPL-SCNC: 29 MMOL/L (ref 21–32)
CREAT SERPL-MCNC: 0.69 MG/DL (ref 0.6–1.3)
ERYTHROCYTE [DISTWIDTH] IN BLOOD BY AUTOMATED COUNT: 16.3 % (ref 11.6–15.1)
GFR SERPL CREATININE-BSD FRML MDRD: 96 ML/MIN/1.73SQ M
GLUCOSE SERPL-MCNC: 128 MG/DL (ref 65–140)
HCT VFR BLD AUTO: 26.4 % (ref 34.8–46.1)
HGB BLD-MCNC: 8.9 G/DL (ref 11.5–15.4)
INR PPP: 1.03 (ref 0.84–1.19)
MCH RBC QN AUTO: 34.8 PG (ref 26.8–34.3)
MCHC RBC AUTO-ENTMCNC: 33.7 G/DL (ref 31.4–37.4)
MCV RBC AUTO: 103 FL (ref 82–98)
PLATELET # BLD AUTO: 184 THOUSANDS/UL (ref 149–390)
PMV BLD AUTO: 10.6 FL (ref 8.9–12.7)
POTASSIUM SERPL-SCNC: 4.1 MMOL/L (ref 3.5–5.3)
PROTHROMBIN TIME: 13.5 SECONDS (ref 11.6–14.5)
RBC # BLD AUTO: 2.56 MILLION/UL (ref 3.81–5.12)
SODIUM SERPL-SCNC: 139 MMOL/L (ref 135–147)
WBC # BLD AUTO: 6.94 THOUSAND/UL (ref 4.31–10.16)

## 2022-10-14 PROCEDURE — NC001 PR NO CHARGE: Performed by: SURGERY

## 2022-10-14 PROCEDURE — 80048 BASIC METABOLIC PNL TOTAL CA: CPT | Performed by: PHYSICIAN ASSISTANT

## 2022-10-14 PROCEDURE — 85610 PROTHROMBIN TIME: CPT | Performed by: PHYSICIAN ASSISTANT

## 2022-10-14 PROCEDURE — 99024 POSTOP FOLLOW-UP VISIT: CPT | Performed by: SURGERY

## 2022-10-14 PROCEDURE — 86340 INTRINSIC FACTOR ANTIBODY: CPT | Performed by: INTERNAL MEDICINE

## 2022-10-14 PROCEDURE — 99232 SBSQ HOSP IP/OBS MODERATE 35: CPT | Performed by: INTERNAL MEDICINE

## 2022-10-14 PROCEDURE — 85730 THROMBOPLASTIN TIME PARTIAL: CPT | Performed by: PHYSICIAN ASSISTANT

## 2022-10-14 PROCEDURE — 85027 COMPLETE CBC AUTOMATED: CPT | Performed by: PHYSICIAN ASSISTANT

## 2022-10-14 RX ORDER — CLOPIDOGREL BISULFATE 75 MG/1
75 TABLET ORAL DAILY
Qty: 60 TABLET | Refills: 0 | Status: SHIPPED | OUTPATIENT
Start: 2022-10-15 | End: 2022-10-14 | Stop reason: SDUPTHER

## 2022-10-14 RX ORDER — CLOPIDOGREL BISULFATE 75 MG/1
75 TABLET ORAL DAILY
Qty: 60 TABLET | Refills: 0 | Status: SHIPPED | OUTPATIENT
Start: 2022-10-15

## 2022-10-14 RX ORDER — ATORVASTATIN CALCIUM 20 MG/1
20 TABLET, FILM COATED ORAL
Qty: 90 TABLET | Refills: 0 | Status: SHIPPED | OUTPATIENT
Start: 2022-10-14

## 2022-10-14 RX ORDER — OXYCODONE HYDROCHLORIDE AND ACETAMINOPHEN 5; 325 MG/1; MG/1
1 TABLET ORAL EVERY 4 HOURS PRN
Qty: 18 TABLET | Refills: 0 | Status: SHIPPED | OUTPATIENT
Start: 2022-10-14 | End: 2022-10-17

## 2022-10-14 RX ORDER — OXYCODONE HYDROCHLORIDE AND ACETAMINOPHEN 5; 325 MG/1; MG/1
1 TABLET ORAL EVERY 4 HOURS PRN
Qty: 18 TABLET | Refills: 0 | Status: SHIPPED | OUTPATIENT
Start: 2022-10-14 | End: 2022-10-14 | Stop reason: SDUPTHER

## 2022-10-14 RX ORDER — ATORVASTATIN CALCIUM 20 MG/1
20 TABLET, FILM COATED ORAL
Qty: 90 TABLET | Refills: 0 | Status: SHIPPED | OUTPATIENT
Start: 2022-10-14 | End: 2022-10-14 | Stop reason: SDUPTHER

## 2022-10-14 RX ADMIN — ACETAMINOPHEN 650 MG: 325 TABLET, FILM COATED ORAL at 09:22

## 2022-10-14 RX ADMIN — ASPIRIN 81 MG: 81 TABLET, COATED ORAL at 08:09

## 2022-10-14 RX ADMIN — HEPARIN SODIUM 5000 UNITS: 5000 INJECTION INTRAVENOUS; SUBCUTANEOUS at 06:09

## 2022-10-14 RX ADMIN — OXYCODONE 5 MG: 5 TABLET ORAL at 12:33

## 2022-10-14 RX ADMIN — OXYCODONE 5 MG: 5 TABLET ORAL at 02:44

## 2022-10-14 RX ADMIN — VITAM B12 100 MCG: 100 TAB at 09:23

## 2022-10-14 RX ADMIN — OXYCODONE 5 MG: 5 TABLET ORAL at 08:09

## 2022-10-14 RX ADMIN — CLOPIDOGREL BISULFATE 75 MG: 75 TABLET ORAL at 08:09

## 2022-10-14 NOTE — CASE MANAGEMENT
Case Management Assessment & Discharge Planning Note    Patient name Jennifer Senior  Location ICU 02/ICU 02 MRN [id number]  : 1965 Date 10/14/2022       Current Admission Date: 10/13/2022  Current Admission Diagnosis:Carotid stenosis, right   Patient Active Problem List    Diagnosis Date Noted   • Obese 10/13/2022   • Vitamin D deficiency 2019   • HTN (hypertension) 10/23/2019   • Hypothyroid 10/23/2019   • Hyperlipidemia 10/23/2019   • Carotid stenosis, right 2017      LOS (days): 1  Geometric Mean LOS (GMLOS) (days):   Days to GMLOS:     OBJECTIVE:    Risk of Unplanned Readmission Score: 5 61         Current admission status: Inpatient       Preferred Pharmacy:   [address] [place] [address]  Phone: [phone] Fax: 77 [phone] [address] [facility]  Phone: [phone] Fax: [phone]    [facility]/pharmacy #[id number] [id number] [place] [address]  Phone: [phone] Fax: [phone]    [address] [place] [address] [address] ,  [address] ,  [address] [facility] [address]  Phone: [phone] Fax: [phone]    Primary Care Provider: Shelbie Matias DO (Inactive)    Primary Insurance: [address] [place] [address]  Secondary Insurance:     ASSESSMENT:  [address] [facility]   Primary Phone: [phone] (Mobile)                         Readmission Root Cause  30 Day Readmission: No    Patient Information  Admitted from[de-identified] Home  Mental Status: Alert  Primary Caregiver: [facility] of Residence: [address] [place] [address] do you live in?: [place]              Patient Information Continued  Income Source: Unknown                  DISCHARGE DETAILS:    Discharge planning discussed with[de-identified] Patient Other Referral/Resources/Interventions Provided:  Interventions: None Indicated  Referral Comments: Per Vascular team, no dc needs for patient at this time   Dc instructions reviewed w/ patient by NP         Treatment Team Recommendation: Home  Discharge Destination Plan[de-identified] Home  Transport at Discharge : Family (Mother-in-law, sister)           ETA of Transport (Date): 10/14/22           Accompanied by: Family member

## 2022-10-14 NOTE — PLAN OF CARE
Problem: MOBILITY - ADULT  Goal: Maintain or return to baseline ADL function  Description: INTERVENTIONS:  -  Assess patient's ability to carry out ADLs; assess patient's baseline for ADL function and identify physical deficits which impact ability to perform ADLs (bathing, care of mouth/teeth, toileting, grooming, dressing, etc )  - Assess/evaluate cause of self-care deficits   - Assess range of motion  - Assess patient's mobility; develop plan if impaired  - Assess patient's need for assistive devices and provide as appropriate  - Encourage maximum independence but intervene and supervise when necessary  - Involve family in performance of ADLs  - Assess for home care needs following discharge   - Consider OT consult to assist with ADL evaluation and planning for discharge  - Provide patient education as appropriate  Outcome: Adequate for Discharge  Goal: Maintains/Returns to pre admission functional level  Description: INTERVENTIONS:  - Perform BMAT or MOVE assessment daily    - Set and communicate daily mobility goal to care team and patient/family/caregiver  - Collaborate with rehabilitation services on mobility goals if consulted  - Perform Range of Motion  times a day  - Reposition patient every  hours    - Dangle patient  times a day  - Stand patient  times a day  - Ambulate patient  times a day  - Out of bed to chair  times a day   - Out of bed for meals  times a day  - Out of bed for toileting  - Record patient progress and toleration of activity level   Outcome: Adequate for Discharge     Problem: PAIN - ADULT  Goal: Verbalizes/displays adequate comfort level or baseline comfort level  Description: Interventions:  - Encourage patient to monitor pain and request assistance  - Assess pain using appropriate pain scale  - Administer analgesics based on type and severity of pain and evaluate response  - Implement non-pharmacological measures as appropriate and evaluate response  - Consider cultural and social influences on pain and pain management  - Notify physician/advanced practitioner if interventions unsuccessful or patient reports new pain  Outcome: Adequate for Discharge     Problem: INFECTION - ADULT  Goal: Absence or prevention of progression during hospitalization  Description: INTERVENTIONS:  - Assess and monitor for signs and symptoms of infection  - Monitor lab/diagnostic results  - Monitor all insertion sites, i e  indwelling lines, tubes, and drains  - Monitor endotracheal if appropriate and nasal secretions for changes in amount and color  - Reading appropriate cooling/warming therapies per order  - Administer medications as ordered  - Instruct and encourage patient and family to use good hand hygiene technique  - Identify and instruct in appropriate isolation precautions for identified infection/condition  Outcome: Adequate for Discharge  Goal: Absence of fever/infection during neutropenic period  Description: INTERVENTIONS:  - Monitor WBC    Outcome: Adequate for Discharge     Problem: SAFETY ADULT  Goal: Maintain or return to baseline ADL function  Description: INTERVENTIONS:  -  Assess patient's ability to carry out ADLs; assess patient's baseline for ADL function and identify physical deficits which impact ability to perform ADLs (bathing, care of mouth/teeth, toileting, grooming, dressing, etc )  - Assess/evaluate cause of self-care deficits   - Assess range of motion  - Assess patient's mobility; develop plan if impaired  - Assess patient's need for assistive devices and provide as appropriate  - Encourage maximum independence but intervene and supervise when necessary  - Involve family in performance of ADLs  - Assess for home care needs following discharge   - Consider OT consult to assist with ADL evaluation and planning for discharge  - Provide patient education as appropriate  Outcome: Adequate for Discharge  Goal: Maintains/Returns to pre admission functional level  Description: INTERVENTIONS:  - Perform BMAT or MOVE assessment daily    - Set and communicate daily mobility goal to care team and patient/family/caregiver  - Collaborate with rehabilitation services on mobility goals if consulted  - Perform Range of Motion  times a day  - Reposition patient every  hours    - Dangle patient  times a day  - Stand patient  times a day  - Ambulate patient  times a day  - Out of bed to chair  times a day   - Out of bed for meals  times a day  - Out of bed for toileting  - Record patient progress and toleration of activity level   Outcome: Adequate for Discharge  Goal: Patient will remain free of falls  Description: INTERVENTIONS:  - Educate patient/family on patient safety including physical limitations  - Instruct patient to call for assistance with activity   - Consult OT/PT to assist with strengthening/mobility   - Keep Call bell within reach  - Keep bed low and locked with side rails adjusted as appropriate  - Keep care items and personal belongings within reach  - Initiate and maintain comfort rounds  - Make Fall Risk Sign visible to staff  - Offer Toileting every  Hours, in advance of need  - Initiate/Maintain alarm  - Obtain necessary fall risk management equipment:   - Apply yellow socks and bracelet for high fall risk patients  - Consider moving patient to room near nurses station  Outcome: Adequate for Discharge     Problem: DISCHARGE PLANNING  Goal: Discharge to home or other facility with appropriate resources  Description: INTERVENTIONS:  - Identify barriers to discharge w/patient and caregiver  - Arrange for needed discharge resources and transportation as appropriate  - Identify discharge learning needs (meds, wound care, etc )  - Arrange for interpretive services to assist at discharge as needed  - Refer to Case Management Department for coordinating discharge planning if the patient needs post-hospital services based on physician/advanced practitioner order or complex needs related to functional status, cognitive ability, or social support system  Outcome: Adequate for Discharge     Problem: Knowledge Deficit  Goal: Patient/family/caregiver demonstrates understanding of disease process, treatment plan, medications, and discharge instructions  Description: Complete learning assessment and assess knowledge base    Interventions:  - Provide teaching at level of understanding  - Provide teaching via preferred learning methods  Outcome: Adequate for Discharge     Problem: CARDIOVASCULAR - ADULT  Goal: Maintains optimal cardiac output and hemodynamic stability  Description: INTERVENTIONS:  - Monitor I/O, vital signs and rhythm  - Monitor for S/S and trends of decreased cardiac output  - Administer and titrate ordered vasoactive medications to optimize hemodynamic stability  - Assess quality of pulses, skin color and temperature  - Assess for signs of decreased coronary artery perfusion  - Instruct patient to report change in severity of symptoms  Outcome: Adequate for Discharge  Goal: Absence of cardiac dysrhythmias or at baseline rhythm  Description: INTERVENTIONS:  - Continuous cardiac monitoring, vital signs, obtain 12 lead EKG if ordered  - Administer antiarrhythmic and heart rate control medications as ordered  - Monitor electrolytes and administer replacement therapy as ordered  Outcome: Adequate for Discharge     Problem: Prexisting or High Potential for Compromised Skin Integrity  Goal: Skin integrity is maintained or improved  Description: INTERVENTIONS:  - Identify patients at risk for skin breakdown  - Assess and monitor skin integrity  - Assess and monitor nutrition and hydration status  - Monitor labs   - Assess for incontinence   - Turn and reposition patient  - Assist with mobility/ambulation  - Relieve pressure over bony prominences  - Avoid friction and shearing  - Provide appropriate hygiene as needed including keeping skin clean and dry  - Evaluate need for skin moisturizer/barrier cream  - Collaborate with interdisciplinary team   - Patient/family teaching  - Consider wound care consult   Outcome: Adequate for Discharge     Problem: Potential for Falls  Goal: Patient will remain free of falls  Description: INTERVENTIONS:  - Educate patient/family on patient safety including physical limitations  - Instruct patient to call for assistance with activity   - Consult OT/PT to assist with strengthening/mobility   - Keep Call bell within reach  - Keep bed low and locked with side rails adjusted as appropriate  - Keep care items and personal belongings within reach  - Initiate and maintain comfort rounds  - Make Fall Risk Sign visible to staff  - Offer Toileting every  Hours, in advance of need  - Initiate/Maintain alarm  - Obtain necessary fall risk management equipment:   - Apply yellow socks and bracelet for high fall risk patients  - Consider moving patient to room near nurses station  Outcome: Adequate for Discharge

## 2022-10-14 NOTE — PROGRESS NOTES
Vascular Nurse Navigator Post Op Education    Met with patient and  Gaston Harrell via speaker phone at bedside to introduce myself as Vascular Nurse Navigator and explained my role  Patient is appropriate and accepting to education  Patient was educated with Review of written materials provided, Teachback, Explanation, Demonstration and Question & Answer on expectations of post op care and recovery on Right CEA  Patient is a former smoker, quit 14 years ago, as such Smoking effects on the lungs, tobacco triggers and Smoking cessation was reviewed  Education provided to patient and her  Gaston Harrell via phone on infection prevention, activity limitations, when to call the office, importance of follow up, and incisional care  Discharge instruction handout provided to patient to review

## 2022-10-14 NOTE — PROGRESS NOTES
Progress Note - vascular Surgery  Junito Coral 62 y o  female MRN: [id number]  Unit/Bed#: ICU 02 Encounter: [id number]    Assessment:  62 y o  female now 1 Day Post-Op s/p Procedure(s) (LRB):  ENDARTERECTOMY ARTERY CAROTID (Eversion) WITH BOVINE PATCH ANGIOPLASTY (Right)    Plan:  - Diet Cardiovascular; Lo Cholesterol  - Pain and Nausea control PRN  - Incentive spirometry  - OOB, ambulate  - DC A-line  - transfer to floor  - plan for DC home today versus tomorrow  - continue aspirin, Plavix, statin    Subjective/Objective     Subjective:  Patient doing well  Does have some right-sided neck pain  Denies change in voice  Tolerating a regular diet  Objective:   Vitals: Blood pressure 139/76, pulse 100, temperature 98 7 °F (37 1 °C), temperature source Tympanic, resp  rate (!) 26, height 5' 3" (1 6 m), weight 83 8 kg (184 lb 11 9 oz), SpO2 99 %  ,Body mass index is 32 73 kg/m²  I/O       10/12 0701  10/13 0700 10/13 0701  10/14 0700    P  O   960    I V  (mL/kg)  2239 6 (26 7)    IV Piggyback  500    Total Intake(mL/kg)  3699 6 (44 1)    Urine (mL/kg/hr)  2290 (1 1)    Blood  30    Total Output  2320    Net  +1379 6          Unmeasured Urine Occurrence  1 x          Physical Exam:  Gen: NAD, Aox3, Comfortable in bed  Neuro:  Cranial nerves intact  Neuromotor exam intact  5/5  strength  HEENT:  Right neck incision site is tender  No hematoma  Mild bruising  No drainage  Suture line is intact  Chest: Normal work of breathing, no respiratory distress  Abd: Soft, ND, NT  Ext: No Edema  Skin: Warm, Dry, Intact      Lab, Imaging and other studies:   I have personally reviewed pertinent reports    , CBC with diff:   Lab Results   Component Value Date    WBC 6 94 10/14/2022    HGB 8 9 (L) 10/14/2022    HCT 26 4 (L) 10/14/2022     (H) 10/14/2022     10/14/2022    MCH 34 8 (H) 10/14/2022    MCHC 33 7 10/14/2022    RDW 16 3 (H) 10/14/2022    MPV 10 6 10/14/2022   , BMP/CMP:   Lab Results Component Value Date    SODIUM 139 10/14/2022    K 4 1 10/14/2022     10/14/2022    CO2 29 10/14/2022    CO2 28 10/13/2022    BUN 16 10/14/2022    CREATININE 0 69 10/14/2022    GLUCOSE 116 10/13/2022    CALCIUM 8 9 10/14/2022    EGFR 96 10/14/2022     VTE Pharmacologic Prophylaxis: Heparin  VTE Mechanical Prophylaxis: sequential compression device        Jr Villegas  10/14/2022  6:35 AM

## 2022-10-14 NOTE — DISCHARGE SUMMARY
Discharge Summary   Ronan Casarez 62 y o  female MRN: [id number]  Unit/Bed#: ICU 02 Encounter: [id number]    Admission Date: 10/13/2022     Discharge Date:10/14/22    Attending:Jonathan Pitts DO     Consultants:  Critical Care for media postoperative medical management    Admitting Diagnosis: Asymptomatic stenosis of right carotid artery [I65 21]    Principle/ Secondary Diagnosis:  Past Medical History:   Diagnosis Date   • Aneurysm ([facility] )     brain   • Arthritis    • Breast cancer ([facility] ) 2001   • Disease of thyroid gland    • Dizziness    • Hyperlipidemia    • Hypertension    • Hypothyroid    • Myocardial infarction [facility])    • Vitiligo      Past Surgical History:   Procedure Laterality Date   • BREAST CYST EXCISION Left 2001    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   • BREAST LUMPECTOMY Left 04/2001    DCIS   • BREAST SURGERY     • COLONOSCOPY     • HIP SURGERY Right     1966, 1975, 1976    • PA THROMBOENDARTECTMY Leyla Zheng Right 10/13/2022    Procedure: ENDARTERECTOMY ARTERY CAROTID (Eversion) WITH BOVINE PATCH ANGIOPLASTY;  Surgeon: DO Knae;  Location: [facility];  Service: Vascular       Procedures Performed: No orders of the defined types were placed in this encounter      PA THROMBOENDARTECTMY NECK,NECK INCIS [96910] (ENDARTERECTOMY ARTERY CAROTID (Eversion))  ENDARTERECTOMY ARTERY CAROTID (Eversion) WITH BOVINE PATCH ANGIOPLASTY (Right Neck)  Procedure(s):  ENDARTERECTOMY ARTERY CAROTID (Eversion) WITH BOVINE PATCH ANGIOPLASTY    Laboratory data at discharge:   Results from last 7 days   Lab Units 10/14/22  0451 10/13/22  2020 10/13/22  0947   WBC Thousand/uL 6 94  --   --    HEMOGLOBIN g/dL 8 9* 9 8*  --    I STAT HEMOGLOBIN g/dl  --   --  8 5*   HEMATOCRIT % 26 4*  --   --    HEMATOCRIT, ISTAT %  --   --  25*   PLATELETS Thousands/uL 184  --   --      Results from last 7 days   Lab Units 10/14/22  0451 10/13/22  0947   POTASSIUM mmol/L 4 1  -- CHLORIDE mmol/L 104  --    CO2 mmol/L 29  --    CO2, I-STAT mmol/L  --  28   BUN mg/dL 16  --    CREATININE mg/dL 0 69  --    GLUCOSE, ISTAT mg/dl  --  116   CALCIUM mg/dL 8 9  --      Results from last 7 days   Lab Units 10/14/22  0451   INR  1 03   PTT seconds 24           Discharge instructions/Information to patient and family:   See after visit summary for information provided to patient and family  Discharge Medications:  See after visit summary for reconciled discharge medications provided to patient and family  Hospital Course:   66-year-old female with HTN, HLD, hypothyroid, breast ca s/p lumpectomy, anterior communicating artery aneurysm, asymptomatic high-grade right carotid stenosis admitted electively for s/p right carotid eversion endarterectomy with bovine patch angioplasty by Dr Iftikhar Grullon 10/13/22  Patient tolerated procedure without incident  She was monitored overnight  Blood pressure remained stable and did not require any gtts  On postoperative day 1 she remained neurologically intact, she is able to ambulate, tolerated diet without dysphagia  She did have mild-to-moderate right neck incisional discomfort improved with right oxycodone  Right neck incision intact with surgical glue, mild swelling and ecchymosis as expected  She was started on Plavix postoperatively and will continue for 60 days after surgery  She was also started on atorvastatin 20 mg q h s  and will continue postoperatively with close monitoring for myalgias  She did have low vitamin B12 and will resume her B complex with follow up with PCP  She was given specific postoperative carotid instructions for incisional care, activity and follow-up  She was deemed stable for discharge home  She is given a script for oxycodone/acetaminophen 5/325 q 4 hours p r n  moderate pain, 18 tablets given  She will follow up in the office 10/27/22 with cerebral are a for postoperative check    She will follow-up with PCP 11/11/22  She was instructed to call the office with any concerns  She will follow-up with neuro surgery for treatment of intracranial aneurysm in the next 6-8 weeks  Hospital course was complicated by the following:None       Prior to discharge, the patient was given instructions for outpatient care and follow-up  The patient has been instructed to call w/ any questions, changes, or concerns for the medical condition  For further details of the hospitalization, please refer to the medical record  Condition at Discharge: stable     Provisions for Follow-Up Care:  See after visit summary for information related to follow-up care and any pertinent home health orders  Disposition: Home    Planned Readmission: No    Discharge Statement   I spent 30 minutes discharging the patient  This time was spent on the day of discharge  I had direct contact with the patient on the day of discharge  Additional documentation is required if more than 30 minutes were spent on discharge  William Beasley  10/14/2022      This text is generated with voice recognition software  There may be translation, syntax,  or grammatical errors  If you have any questions, please contact the dictating provider

## 2022-10-14 NOTE — PROGRESS NOTES
[address] [place] [address]  Progress Note Lynn Profit 1965, 62 y o  female MRN: [id number]  Unit/Bed#: ICU 02 Encounter: [id number]  Primary Care Provider: Katherin Bridges DO (Inactive)   Date and time admitted to hospital: 10/13/2022  5:56 AM    * Carotid stenosis, right  Assessment & Plan  S/P right carotid endarterectomy with bovine patch angioplasty  Had elevated BP  Received labetalol IV  Plan  Close hemodynamic monitoring  BP goal -160 mmHg  PRN labetalol, hydralazine, nicardipine for SBP>160mmHg  IV fluids for SBP<100 mmHg  Continue Asprin, atorvastatin, clopidogrel  HTN (hypertension)  Assessment & Plan  Plan  SBP goal 100-160  Hypothyroid  Assessment & Plan  Plan  Continue Levothyroxine 300 mcg  Hyperlipidemia  Assessment & Plan  Plan  Continue atorvastatin  Obese  Assessment & Plan  Plan  Counseling on life style modifications  Chief Complaint: Hemodynamic monitoring following Carotid endarterectomy  HPI/24hr events: Patient had elevated BP overnight  Had labetalol 5 mg IV  Now having acceptable BP  No active bleeding  Having mild discomfort in neck and jaw  But no chest pain, palpitations or SOB  UOP good       Vitals:   Vitals:    10/14/22 0400 10/14/22 0500 10/14/22 0600 10/14/22 0615   BP:    139/76   Pulse: 98 102 100 100   Resp: 14 12 20 (!) 26   Temp: 98 7 °F (37 1 °C)      TempSrc: Tympanic      SpO2: 98% 97% 98% 99%   Weight:       Height:         Temp  Min: 97 °F (36 1 °C)  Max: 98 8 °F (37 1 °C)       SpO2: SpO2: 99 %      Intake/Output Summary (Last 24 hours) at 10/14/2022 0630  Last data filed at 10/14/2022 0600  Gross per 24 hour   Intake 3699 58 ml   Output 2320 ml   Net 1379 58 ml       Invasive/non-invasive ventilation settings:   Respiratory  Report   Lab Data (Last 4 hours)    None         O2/Vent Data (Last 4 hours)    None                Invasive Devices  Report    Peripheral Intravenous Line  Duration           Peripheral IV 10/13/22 Right Wrist <1 day    Peripheral IV 10/13/22 Right;Ventral (anterior) Forearm <1 day                Active medications:    Current Facility-Administered Medications:   •  acetaminophen (TYLENOL) tablet 650 mg, 650 mg, Oral, Q6H PRN  •  aspirin (ECOTRIN LOW STRENGTH) EC tablet 81 mg, 81 mg, Oral, Daily  •  atorvastatin (LIPITOR) tablet 20 mg, 20 mg, Oral, Daily With Dinner, 20 mg at 10/13/22 1705  •  clopidogrel (PLAVIX) tablet 75 mg, 75 mg, Oral, Daily  •  heparin (porcine) subcutaneous injection 5,000 Units, 5,000 Units, Subcutaneous, Q8H [facility], 5,000 Units at 10/14/22 0609 **AND** [CANCELED] Platelet count, , , Once  •  labetalol (NORMODYNE) injection 5 mg, 5 mg, Intravenous, Q15 Min PRN, 5 mg at 10/13/22 2014 **AND** hydrALAZINE (APRESOLINE) injection 15 mg, 15 mg, Intravenous, Q15 Min PRN **AND** niCARdipine (CARDENE) 25 mg (STANDARD CONCENTRATION) in sodium chloride 0 9% 250 mL, 1-15 mg/hr, Intravenous, Continuous PRN  •  levothyroxine tablet 300 mcg, 300 mcg, Oral, Early Morning  •  ondansetron (ZOFRAN) injection 4 mg, 4 mg, Intravenous, Q6H PRN  •  oxyCODONE (ROXICODONE) IR tablet 2 5 mg, 2 5 mg, Oral, Q4H PRN  •  oxyCODONE (ROXICODONE) IR tablet 5 mg, 5 mg, Oral, Q4H PRN, 5 mg at 10/14/22 0244  •  sodium chloride 0 9 % bolus 500 mL, 500 mL, Intravenous, Once PRN, Stopped at 10/13/22 1450 **FOLLOWED BY** [START ON 10/15/2022] phenylephrine (ADRIENNE-SYNEPHRINE) 50 mg (STANDARD CONCENTRATION) in sodium chloride 0 9% 250 mL,  mcg/min, Intravenous, Continuous PRN    Hemodynamic Monitoring  N/A    Physical Exam:  General Appearance: healthy and cooperative    Skin: Normal findings:  no ecchymoses, no jaundice, no purpura    Skin Breakdown and Location: no    H/ENT: Normocephalic, without obvious abnormality, atraumatic neck without nodes    Eyes: negative, conjunctivae/corneas clear  PERRL, EOM's intact  Fundi benign      Cardiac: regular rate and rhythm and S1, S2 normal    Pulmonary: clear to auscultation bilaterally    Gastrointestinal: soft, non-tender; bowel sounds normal; no masses,  no organomegaly    Extremities: normal strength, tone, and muscle mass    Musculoskeletal: negative    Neuro: normal without focal findings and mental status, speech normal, alert and oriented x3    Psych: oriented to time, place and person    : deferred           Lab: I have personally reviewed pertinent lab results  Imaging: I have personally reviewed pertinent reports      EKG:   VTE Pharmacologic Prophylaxis: Heparin  VTE Mechanical Prophylaxis: sequential compression device  ID: Microbiology   Pain: No pain  Code Status: Level 1 - Full Code

## 2022-10-14 NOTE — ASSESSMENT & PLAN NOTE
S/P right carotid endarterectomy with bovine patch angioplasty  Had elevated BP  Received labetalol IV  Plan  Close hemodynamic monitoring  BP goal -160 mmHg  PRN labetalol, hydralazine, nicardipine for SBP>160mmHg  IV fluids for SBP<100 mmHg  Continue Asprin, atorvastatin, clopidogrel

## 2022-10-14 NOTE — PLAN OF CARE
Problem: MOBILITY - ADULT  Goal: Maintain or return to baseline ADL function  Description: INTERVENTIONS:  -  Assess patient's ability to carry out ADLs; assess patient's baseline for ADL function and identify physical deficits which impact ability to perform ADLs (bathing, care of mouth/teeth, toileting, grooming, dressing, etc )  - Assess/evaluate cause of self-care deficits   - Assess range of motion  - Assess patient's mobility; develop plan if impaired  - Assess patient's need for assistive devices and provide as appropriate  - Encourage maximum independence but intervene and supervise when necessary  - Involve family in performance of ADLs  - Assess for home care needs following discharge   - Consider OT consult to assist with ADL evaluation and planning for discharge  - Provide patient education as appropriate  Outcome: Progressing  Goal: Maintains/Returns to pre admission functional level  Description: INTERVENTIONS:  - Perform BMAT or MOVE assessment daily    - Set and communicate daily mobility goal to care team and patient/family/caregiver     - Collaborate with rehabilitation services on mobility goals if consulted  - Out of bed for toileting  - Record patient progress and toleration of activity level   Outcome: Progressing     Problem: PAIN - ADULT  Goal: Verbalizes/displays adequate comfort level or baseline comfort level  Description: Interventions:  - Encourage patient to monitor pain and request assistance  - Assess pain using appropriate pain scale  - Administer analgesics based on type and severity of pain and evaluate response  - Implement non-pharmacological measures as appropriate and evaluate response  - Consider cultural and social influences on pain and pain management  - Notify physician/advanced practitioner if interventions unsuccessful or patient reports new pain  Outcome: Progressing     Problem: INFECTION - ADULT  Goal: Absence or prevention of progression during hospitalization  Description: INTERVENTIONS:  - Assess and monitor for signs and symptoms of infection  - Monitor lab/diagnostic results  - Monitor all insertion sites, i e  indwelling lines, tubes, and drains  - Monitor endotracheal if appropriate and nasal secretions for changes in amount and color  - Hayden appropriate cooling/warming therapies per order  - Administer medications as ordered  - Instruct and encourage patient and family to use good hand hygiene technique  - Identify and instruct in appropriate isolation precautions for identified infection/condition  Outcome: Progressing  Goal: Absence of fever/infection during neutropenic period  Description: INTERVENTIONS:  - Monitor WBC    Outcome: Progressing     Problem: SAFETY ADULT  Goal: Maintain or return to baseline ADL function  Description: INTERVENTIONS:  -  Assess patient's ability to carry out ADLs; assess patient's baseline for ADL function and identify physical deficits which impact ability to perform ADLs (bathing, care of mouth/teeth, toileting, grooming, dressing, etc )  - Assess/evaluate cause of self-care deficits   - Assess range of motion  - Assess patient's mobility; develop plan if impaired  - Assess patient's need for assistive devices and provide as appropriate  - Encourage maximum independence but intervene and supervise when necessary  - Involve family in performance of ADLs  - Assess for home care needs following discharge   - Consider OT consult to assist with ADL evaluation and planning for discharge  - Provide patient education as appropriate  Outcome: Progressing  Goal: Maintains/Returns to pre admission functional level  Description: INTERVENTIONS:  - Perform BMAT or MOVE assessment daily    - Set and communicate daily mobility goal to care team and patient/family/caregiver     - Collaborate with rehabilitation services on mobility goals if consulted  - Out of bed for toileting  - Record patient progress and toleration of activity level   Outcome: Progressing  Goal: Patient will remain free of falls  Description: INTERVENTIONS:  - Educate patient/family on patient safety including physical limitations  - Instruct patient to call for assistance with activity   - Consult OT/PT to assist with strengthening/mobility   - Keep Call bell within reach  - Keep bed low and locked with side rails adjusted as appropriate  - Keep care items and personal belongings within reach  - Initiate and maintain comfort rounds  - Make Fall Risk Sign visible to staff  - Offer Toileting  in advance of need  - Initiate/Maintain bed alarm  - Obtain necessary fall risk management equipment  - Apply yellow socks and bracelet for high fall risk patients  - Consider moving patient to room near nurses station  Outcome: Progressing

## 2022-10-14 NOTE — UTILIZATION REVIEW
Initial Clinical Review    Elective   INPT  surgical procedure   (postop admitted to  Critical level of care)    Age/Sex: 62 y o  female  Surgery Date:  10/13  Procedure:  ENDARTERECTOMY ARTERY CAROTID (Eversion) WITH BOVINE PATCH ANGIOPLASTY (Right)   Anesthesia: general     Operative Findings: Heavily calcified plaque at carotid bulb and extending into proximal ICA  -Hypoglossal and vagus nerve identified and protected throughout case  -No SSEP/EEG changes throughout case  -Heparin 10,000 units  -Protamine: 30mg  -Moving all extremities symmetrically   -intraop completion duplex: no obvious mobile flaps; adequate waveform/velocities    Postop ICU monitoring - keep systolic blood pressure   Continue statin   Continue levothyroxine and check TSH   Check folic acid and X66   Hourly neuro cks and VS   Post procedure bed rest     POD#1 Progress Note:    10/14   alert and oriented  Some numbness at L jaw just cephalad to incision site  No Weakness  No trouble swallowing  IV Pressors, IVF weaned off  Cont neurovascular cks, serial exams incision    START ASA and PLAVIX today         dmission Orders: Date/Time/Statement:   Admission Orders (From admission, onward)     Ordered        10/13/22 1125  Inpatient Admission  Once                      Orders Placed This Encounter   Procedures   • Inpatient Admission     Standing Status:   Standing     Number of Occurrences:   1     Order Specific Question:   Level of Care     Answer:   Critical Care [15]     Order Specific Question:   Estimated length of stay     Answer:   Inpatient Only Surgery     Vital Signs: /74   Pulse 104   Temp 97 8 °F (36 6 °C) (Oral)   Resp (!) 33   Ht 5' 3" (1 6 m)   Wt 83 8 kg (184 lb 11 9 oz)   SpO2 98%   BMI 32 73 kg/m²     Pertinent Labs/Diagnostic Test Results:   VAS intra-op ultrasound CEA   Final Result by Irwin Corley DO (10/13 1221)        Results from last 7 days   Lab Units 10/08/22  1247   SARS-COV-2 Negative     Results from last 7 days   Lab Units 10/14/22  0451 10/13/22  2020 10/13/22  0947   WBC Thousand/uL 6 94  --   --    HEMOGLOBIN g/dL 8 9* 9 8*  --    I STAT HEMOGLOBIN g/dl  --   --  8 5*   HEMATOCRIT % 26 4*  --   --    HEMATOCRIT, ISTAT %  --   --  25*   PLATELETS Thousands/uL 184  --   --          Results from last 7 days   Lab Units 10/14/22  0451 10/13/22  0947   SODIUM mmol/L 139  --    POTASSIUM mmol/L 4 1  --    CHLORIDE mmol/L 104  --    CO2 mmol/L 29  --    CO2, I-STAT mmol/L  --  28   ANION GAP mmol/L 6  --    BUN mg/dL 16  --    CREATININE mg/dL 0 69  --    EGFR ml/min/1 73sq m 96  --    CALCIUM mg/dL 8 9  --    CALCIUM, IONIZED, ISTAT mmol/L  --  1 13             Results from last 7 days   Lab Units 10/14/22  0451   GLUCOSE RANDOM mg/dL 128     Results from last 7 days   Lab Units 10/13/22  0947   I STAT BASE EXC mmol/L 1   I STAT O2 SAT % 100*   ISTAT PH ART  7 389   I STAT ART PCO2 mm HG 43 4   I STAT ART PO2 mm  0*   I STAT ART HCO3 mmol/L 26 2                 Results from last 7 days   Lab Units 10/14/22  0451   PROTIME seconds 13 5   INR  1 03   PTT seconds 24     Results from last 7 days   Lab Units 10/13/22  1738   TSH 3RD GENERATON uIU/mL 1 540         Diet:  Lo chol   Mobility: amb qid starting 10/14   DVT Prophylaxis: scd's    Medications/Pain Control:   Scheduled Medications:  aspirin, 81 mg, Oral, Daily  atorvastatin, 20 mg, Oral, Daily With Dinner  clopidogrel, 75 mg, Oral, Daily  heparin (porcine), 5,000 Units, Subcutaneous, Q8H KEVON  levothyroxine, 300 mcg, Oral, Early Morning      Continuous IV Infusions:  niCARdipine, 1-15 mg/hr, Intravenous, Continuous PRN  [START ON 10/15/2022] phenylephine,  mcg/min, Intravenous, Continuous PRN      PRN Meds:  acetaminophen, 650 mg, Oral, Q6H PRN  labetalol, 5 mg, Intravenous, Q15 Min PRN  ondansetron, 4 mg, Intravenous, Q6H PRN  oxyCODONE, 2 5 mg, Oral, Q4H PRN  oxyCODONE, 5 mg, Oral, Q4H PRN      given x4 postop Network Utilization Review Department  ATTENTION: Please call with any questions or concerns to [phone] and carefully listen to the prompts so that you are directed to the right person  All voicemails are confidential   Katherine Michaud all requests for admission clinical reviews, approved or denied determinations and any other requests to dedicated fax number below belonging to the campus where the patient is receiving treatment   List of dedicated fax numbers for the Facilities:  [address] DENIALS (Administrative/Medical Necessity) [phone]   [address] (Maternity/NICU/Pediatrics) [phone]   9 Cady Kemp [phone]   [facility] [address] [phone]   [address] [place] [address] Jesenia Wooten 28 [phone]   155 [facility] 134 815 [facility] [phone]

## 2022-10-14 NOTE — DISCHARGE INSTRUCTIONS
DISCHARGE INSTRUCTIONS  CAROTID ENDARTERECTOMY    ACTIVITY:  Limit your physical activity to walking for the first week and then increase your activity as tolerated  Walking up steps and normal activities may be resumed as you feel ready  Avoid strenuous activity such as vigorous exercise  Avoid heavy lifting (do not lift more than 15 pounds) for the first four weeks after surgery  You should not drive a car for at least one week following discharge from the hospital and you are off all narcotic pain medication  You may ride in a car  If you have had a stroke or mini-stroke, please consult with your neurologist prior to driving  DO NOT START OR RESUME ANY PREVIOUSLY PLANNED THERAPY (PHYSICAL, CARDIAC, REHAB, ETC…) UNTIL YOU DISCUSS WITH YOUR SURGEON AND THEY FEEL IT IS SAFE TO ENGAGE IN THERAPY  DIET:  Resume your normal diet  Good nutrition is important for healing of your incision  You can expect to have a sore throat and trouble swallowing after surgery which should improve quickly  If you feel like you are choking, please call your doctor  RECURRENT SYMPTOMS: If you develop any new numbness, weakness, vision changes, drooping of the face, or difficulty with speech after discharge, CALL 911 or go to the nearest Emergency department immediately  INCISION SITE:  You may have surgical glue at your incision site  There are stitches present under the skin which will absorb on their own  The glue is used to cover the incision, assist in closure, and prevent contamination  This adhesive will darken and peel away on its own within one to two weeks  Do not pick at it  If you have a bandage, please remove this on the second day after surgery  You should shower daily  Wash incision daily with soap and water, but do not rub or scrub the incision; rinse thoroughly and pat dry  Do not bathe in a tub or swim for the first 4 weeks following surgery or if you have any open wounds    It is normal to have some bruising, swelling or discoloration around the incision  IF increasing redness, pain, bleeding or a bulge develops, call our office immediately  If you notice any active bleeding at the site, apply pressure to the site and call our office ([phone]) or [phone]  FOLLOW UP STUDIES: Doppler ultrasound studies are very important to your post-operative care  Your surgeon will arrange them at your first postoperative visit  The first study will be 3 months after surgery  FOLLOW UP APPOINTMENTS:  Making and keeping follow up appointments and ultrasound tests are important to your recovery  If you have difficulty making it to or keeping your follow up appointments, call the office  If you have increased pain, fever >101 5, increased drainage, redness or a bad smell at your surgery site, new coldness/numbness of your arm or leg, please call us immediately and GO directly to the ER  PLEASE CALL THE OFFICE IF YOU HAVE ANY QUESTIONS  [phone]  [phone]  [address] [place] [address] , [address], [facility] [place], [facility]  [address], 210 [facility]  [address], [facility], 98 [facility]  611 [facility] One [facility],[address], [address] [place] [address], [address] [facility] [address]  Prosper Paul [address], 1st Floor, Sabino Charles 34  [facility] 19, [address] [facility], [address]  1201 [facility], 8614 [place], [place] [facility] [place], [address]  One [facility], 532 [facility], One [facility],[address], Ester Escobar 6  201 [facility], Jasbir Vargas, [address]

## 2022-10-16 LAB — IF BLOCK AB SER QL RIA: 170.4 AU/ML (ref 0–1.1)

## 2022-10-17 ENCOUNTER — TELEPHONE (OUTPATIENT)
Dept: VASCULAR SURGERY | Facility: CLINIC | Age: 57
End: 2022-10-17

## 2022-10-17 DIAGNOSIS — I65.21 CAROTID STENOSIS, RIGHT: ICD-10-CM

## 2022-10-17 RX ORDER — OXYCODONE HYDROCHLORIDE AND ACETAMINOPHEN 5; 325 MG/1; MG/1
1 TABLET ORAL EVERY 4 HOURS PRN
Qty: 12 TABLET | Refills: 0 | Status: SHIPPED | OUTPATIENT
Start: 2022-10-17 | End: 2022-10-20

## 2022-10-17 NOTE — TELEPHONE ENCOUNTER
Pt s/p R carotid endarterectomy w/ bovine patch angioplasty 10/13 by Dr Isaac Wise  She was discharged on 10/14 w/ rx for percocet 5-325 #18  Pt called the office today requesting refill  She has 2 tablets left but states she will run out after today  She states that she is taking 1 tablet q 4 hrs because the pain reaches up to 8/10 when the pain medication starts to wear off  She reports that after she takes 1 tablet her pain comes down to about 5/10  She denies any erythema, drainage, or open areas  She reports minimal swelling and ecchymosis and states that the area around the incision is itchy  She has tried regular tylenol for he pain, which she states does not help  She uses [facility] in [employer]  Informed her I would need to send a message to our triage provider and we will call her back

## 2022-10-17 NOTE — UTILIZATION REVIEW
NOTIFICATION OF ADMISSION DISCHARGE   This is a Notification of Discharge from 600 [facility]  Please be advised that this patient has been discharge from our facility  Below you will find the admission and discharge date and time including the patient’s disposition  UTILIZATION REVIEW CONTACT:  Jacqui Donohue  Utilization   Network Utilization Review Department  Phone: [phone] x carefully listen to the prompts  All voicemails are confidential   Email: [email or web address]     ADMISSION INFORMATION  PRESENTATION DATE: 10/13/2022  5:56 AM    INPATIENT ADMISSION DATE: 10/13/22 11:25 AM   DISCHARGE DATE: 10/14/2022 12:43 PM  DISPOSITION: Home/Self Care Home/Self Care      IMPORTANT INFORMATION:  Send all requests for admission clinical reviews, approved or denied determinations and any other requests to dedicated fax number below belonging to the campus where the patient is receiving treatment   List of dedicated fax numbers:  [address] DENIALS (Administrative/Medical Necessity) [phone]   [address] (Maternity/NICU/Pediatrics) [phone]   [facility] [phone]   [facility] 87 [phone]   Disca Gaiol 134 [phone]   220 [facility] [phone]   90 [facility] [phone]   [facility]1 [facility] 119 [phone]   Gema Aguilares [phone]   4056 [facility] [phone]   412 [facility] 850 E [facility] [phone]

## 2022-10-17 NOTE — OP NOTE
OPERATIVE REPORT  PATIENT NAME: An Jerome    :  1965  MRN: [id number]  Pt Location: [place] [facility] 09    SURGERY DATE: 10/13/2022    Surgeon(s) and Role:     * Jonathan Leigh,  - Primary     * Epifanio Campos PA-C - Assisting    Preop Diagnosis:  Asymptomatic stenosis of right carotid artery [I65 21]    Post-Op Diagnosis Codes:     * Asymptomatic stenosis of right carotid artery [I65 21]    Procedure(s) (LRB):  ENDARTERECTOMY ARTERY CAROTID WITH BOVINE PATCH ANGIOPLASTY (Right)    Specimen(s):  * No specimens in log *    Estimated Blood Loss:   30 mL    Drains:  [REMOVED] Urethral Catheter Non-latex 16 Fr  (Removed)   Number of days: 0       Anesthesia Type:   General    Operative Indications:  Asymptomatic stenosis of right carotid artery [I65 21]  Patient  is a 26-year-old woman with high-grade right carotid stenosis  Right carotid endarterectomy (possible eversion) recommended  The procedure, risks, benefits, alternatives, anticipated postop course were discussed in detail  Patient was agreeable proceed  Written consent was obtained  Patient brought    Operative Findings:  -Heavily calcified plaque at carotid bulb and extending into proximal ICA  -Hypoglossal and vagus nerve identified and protected throughout case  -No SSEP/EEG changes throughout case  -Heparin 10,000 units  -Protamine: 30mg  -Moving all extremities symmetrically   -intraop completion duplex: no obvious mobile flaps; adequate waveform/velocities    Complications:   None    Procedure and Technique:  The patient was properly identified in the preop holding area  Patient's name, laterality, nature procedure verified  The operative site was marked  Patient was brought to the operating room she was positioned supine on the OR table  After adequate induction general anesthesia via endotracheal tube a radial artery monitoring line was placed  Rosas catheter was inserted under sterile conditions    SSEP and EEG monitoring leads were positioned  The patient was positioned with head turned towards the left and a shoulder roll to facilitate exposure of the right neck  A preoperative dose of antibiotics was administered  The patient's right neck and upper chest were prepped and draped in usual sterile fashion  A formal time-out was performed  A slightly oblique incision was carried out along the skin crease using a 15  Scalpel  The incision was deepened down through the subcutaneous tissue using electrocautery  The platysma was divided  The sternocleidomastoid was identified and retracted laterally  Dissection was carried down onto the internal jugular vein which was mobilized along its anterior portion  The facial vein was identified and divided between silk ties  Dissection was carried down onto the distal common carotid artery which was dissected free from surrounding tissue and encircled with a vessel loop  The vagus nerve was identified and protected throughout the procedure  Dissection was carried distally and the superior thyroid, external carotid artery and internal carotid artery was similarly dissected free from its surrounding tissue and encircled with separate silastic loops  Of note the internal carotid artery was posterior and slightly medial to the external carotid artery  As such dissection was carried out to free the internal carotid to allow anteromedial rotation of the vessel  The hypoglossal nerve was identified and protected throughout the procedure  The patient was systemically heparinized  ACT monitoring was carried out  Additional aliquots of heparin were given as needed  After appropriate circulation time the vessels were occluded using combination of silastic loops and atraumatic vascular clamps x3 minutes  No SSEP/EEG monitoring changes were identified  Next, a 11   Scalpel was used to create an arteriotomy in the distal common carotid artery and extended onto the posterior surface of the ICA   A [place] elevator was next used to perform the endarterectomy  The endarterectomy surface was copiously irrigated  Any loose intimal fronds were removed using fine forceps  A tacking  7-0 Prolene suture was placed at the distal endpoint  A bovine pericardial patch was opened, prepped and brought on the field  Backflow into place using a 6-0 Prolene suture in a running fashion  Prior to completing the suture line the vessels were appropriately back bled  The patch plasty was completed suture line secured  Flow was restored initially into the external carotid artery followed by antegrade cerebral flow  The patch site appeared to be hemostatic  Attention was next turned towards the completion intraoperative duplex  No gross mobile flaps were identified  There was adequate waveforms and velocities  The patient was next given 20 mg of protamine  The incision was closed in multilayer fashion  Exofin glue was applied to the incision site  Patient was awakened, extubated transferred to recovery room  Patient was noted to be moving all extremities symmetrically     I was present for the entire procedure and A qualified resident physician was not available    Patient Disposition:  PACU         SIGNATURE: Timbo Walton DO  DATE: October 17, 2022  TIME: 12:29 PM

## 2022-10-17 NOTE — TELEPHONE ENCOUNTER
Nursing: Please call patient  Patient education and recommend judicious use of narcotic pain medication  Perhaps use can use APAP 500 every 8 hours around the clock for 3 days  If needed, she can take one Percocet 5/325 (try to keep it up to 4 doses daily ) There is also APAP in Percocet, so she must remain <4 g per day  She should not be driving after surgery anyway but certainly not on narcotic pain Rx  Script sent to pharmacy on file  Thank  you

## 2022-10-18 ENCOUNTER — TELEPHONE (OUTPATIENT)
Dept: VASCULAR SURGERY | Facility: CLINIC | Age: 57
End: 2022-10-18

## 2022-10-18 NOTE — TELEPHONE ENCOUNTER
Vascular Nurse Navigator Post Op Call    Procedure: ENDARTERECTOMY ARTERY CAROTID WITH BOVINE PATCH ANGIOPLASTY (Right)    Date of Procedure: 10/13/22    Surgeon:    Pilar Herrera [address] [place] [address], DO - Primary     * Delia John PA-C - Assisting    Discharge Date: 10/14/22    Discharge Disposition:  Home    Change in Vision?: No    Change in Speech?: No    Weakness?: No    Uncontrolled Pain?: No    Hoarseness?: No    Trouble Swallowing?: No    Incision Concerns?: No    Anticoagulation pt was discharged on post op?: Aspirin and Clopidogrel (Plavix)    Statin pt was discharged on post op?: Lipitor (atorvastatin)    Bleeding?: No    Fever/chills?: No      Reviewed discharge instructions and incision care with patient  NEXT OFFICE VISIT SCHEDULED:  10/27/22 at 3:30 pm with Harjeet Mullins at [facility] Available?: Yes      Any further questions/concerns? Patient stated that she is doing good since discharge  She stated that her pain is controlled  She stated that he neck continues to be swollen, but has improved since discharge  Reviewed incision care with her - wash daily with soap and water  Reviewed discharge medications - Aspirin, Plavix, and Lipitor  All questions answered

## 2022-10-27 ENCOUNTER — OFFICE VISIT (OUTPATIENT)
Dept: VASCULAR SURGERY | Facility: CLINIC | Age: 57
End: 2022-10-27

## 2022-10-27 VITALS
DIASTOLIC BLOOD PRESSURE: 80 MMHG | SYSTOLIC BLOOD PRESSURE: 132 MMHG | WEIGHT: 176 LBS | HEIGHT: 63 IN | BODY MASS INDEX: 31.18 KG/M2 | HEART RATE: 66 BPM

## 2022-10-27 DIAGNOSIS — I65.21 CAROTID STENOSIS, RIGHT: Primary | ICD-10-CM

## 2022-10-27 PROCEDURE — 99024 POSTOP FOLLOW-UP VISIT: CPT | Performed by: NURSE PRACTITIONER

## 2022-10-27 NOTE — PROGRESS NOTES
Assessment/Plan:    Carotid stenosis, right  51-year-old female with HTN, HLD, hypothyroid, breast ca s/p lumpectomy, anterior communicating artery aneurysm, asymptomatic high-grade right carotid stenosis admitted electively for s/p right carotid eversion endarterectomy with bovine patch angioplasty by Dr Zac Gracia 10/13/22  Patient presents the office for postoperative visit   -Right neck incision intact with surgical glue, mild swelling  No dehiscence or drainage  No eyrthema  -Intermittent pulling at incision  No significant pain   -Patient's main concern is bruising and labile/ "drugged" feeling since surgery  Patient feels related to Plavix  Unclear if  this is related to Plavix other than bruising  Could represent anxiety related to medication though patient does not feel this is anxiety  Patient does have sensitivities to medications  On exam cranial nerves grossly intact with no focal deficit  -BP is adequately controlled  -Will trial 1/2 dose of Plavix and ASA 81mg for the next 7-10 days and see if there is a notable difference and may further decrease Plavix 1/2 tablet every other day   -Follow up in the office in 2 weeks to recheck  -Post operative carotid duplex in 3 months         Diagnoses and all orders for this visit:    Carotid stenosis, right  -     VAS carotid complete study; Future          Subjective:      Patient ID: Saintclair Siad is a 62 y o  female  Patient present as a Post Op for a CEA done on 101/13/22  Patient denies any pain, soreness, trouble swallowing or hoarseness  Patient states if she over do it moving around she will get some pain occasionally  Patient reports not feeling well since the surgery  She feels like whatever medication that she was giving is not doing well  Patient states she feels like she is on drugs and she was not feeling like that before surgery  Patient is currently taking ASA and Atorvastatin      HPI  51-year-old female with HTN, HLD, hypothyroid, breast ca s/p lumpectomy, anterior communicating artery aneurysm, asymptomatic high-grade right carotid stenosis admitted electively for s/p right carotid eversion endarterectomy with bovine patch angioplasty by Dr Gato Ramsey 10/13/22  Patient presents the office for postoperative visit  Patient presents to the office with her   She has complaints of drugged feeling or feelings of rage since surgery that have increasingly gotten worse  Her  notes her speech has a prolonged jenny  She has some tremors in her hands  She has no focal neurological events  She is neurologically intact  She also notes spontaneous bruising of the extremities and right lumbar sacral region  She has significant concerns about Plavix and feels that symptoms are related to Plavix  She is tolerating a diet without any dysphagia  She is staying well hydrated  She is taking Lipitor about every 3rd night  She previously had severe muscle cramping with statin therapy  Patient seen and examined by myself and Dr Mona Mills   The following portions of the patient's history were reviewed and updated as appropriate: allergies, current medications, past family history, past medical history, past social history, past surgical history and problem list   ROS reviewed     Review of Systems   Constitutional: Negative  HENT: Negative  Eyes: Negative  Respiratory: Negative  Cardiovascular: Negative  Gastrointestinal: Negative  Endocrine: Negative  Genitourinary: Negative  Musculoskeletal: Negative  Skin: Negative  Allergic/Immunologic: Negative  Neurological: Negative  Hematological: Negative  Psychiatric/Behavioral: Negative  Objective:  I have reviewed and made appropriate changes to the review of systems input by the medical assistant      Vitals:    10/27/22 1511   BP: 132/80   BP Location: Left arm   Patient Position: Sitting   Cuff Size: Adult   Pulse: 66   Weight: 79 8 kg (176 lb) Height: 5' 3" (1 6 m)       Patient Active Problem List   Diagnosis   • Carotid stenosis, right   • HTN (hypertension)   • Hypothyroid   • Hyperlipidemia   • Vitamin D deficiency   • Obese       Past Surgical History:   Procedure Laterality Date   • BREAST CYST EXCISION Left 2001    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   • BREAST LUMPECTOMY Left 04/2001    DCIS   • BREAST SURGERY     • COLONOSCOPY     • HIP SURGERY Right     1966, 1975, 1976    • [place] THROMBOENDARTECTMY [place] Right 10/13/2022    Procedure: ENDARTERECTOMY ARTERY CAROTID (Eversion) WITH BOVINE PATCH ANGIOPLASTY;  Surgeon: Mark Borrego DO;  Location: [facility];  Service: Vascular       Family History   Problem Relation Age of Onset   • Other Mother         Carotid stenosis, bilateral    • Heart disease Mother    • Hypertension Mother    • Lung cancer Father         smoker   • Liver cancer Father    • Hypertension Father    • No Known Problems Sister    • No Known Problems Sister    • Heart attack Maternal Grandmother    • Thyroid disease Maternal Grandmother    • No Known Problems Maternal Grandfather    • No Known Problems Paternal Grandmother    • No Known Problems Paternal Grandfather    • Hypertension Brother    • No Known Problems Brother    • No Known Problems Maternal Aunt    • No Known Problems Maternal Aunt    • Cancer Paternal Aunt        Social History     Socioeconomic History   • Marital status: /Civil Union     Spouse name: Not on file   • Number of children: Not on file   • Years of education: Not on file   • Highest education level: Not on file   Occupational History   • Not on file   Tobacco Use   • Smoking status: Former Smoker   • Smokeless tobacco: Never Used   • Tobacco comment: Smoked 20yrs up to 1ppd    Quit in 2008   Vaping Use   • Vaping Use: Never used   Substance and Sexual Activity   • Alcohol use: Yes     Comment: Occasional    • Drug use: Never   • Sexual activity: Not Currently     Partners: Male     Birth control/protection: Post-menopausal   Other Topics Concern   • Not on file   Social History Narrative   • Not on file     Social Determinants of Health     Financial Resource Strain: Not on file   Food Insecurity: Not on file   Transportation Needs: Not on file   Physical Activity: Not on file   Stress: Not on file   Social Connections: Not on file   Intimate Partner Violence: Not on file   Housing Stability: Not on file       Allergies   Allergen Reactions   • Latex Rash   • Other Rash     Adhesives         Current Outpatient Medications:   •  amoxicillin (AMOXIL) 500 MG tablet, Take 500 mg by mouth 2 (two) times a day, Disp: , Rfl:   •  [place] Thyroid 120 MG tablet, Take 120 mg by mouth every other day, Disp: , Rfl:   •  ARMOUR THYROID 90 MG tablet, TAKE 1 TABLET DAILY (Patient taking differently: Take 90 mg by mouth every other day), Disp: 30 tablet, Rfl: 0  •  Ascorbic Acid (VITAMIN C) 1000 MG tablet, Take 500 mg by mouth daily, Disp: , Rfl:   •  aspirin 81 MG tablet, Take 1 tablet by mouth daily, Disp: , Rfl:   •  atorvastatin (LIPITOR) 20 mg tablet, Take 1 tablet (20 mg total) by mouth daily with dinner, Disp: 90 tablet, Rfl: 0  •  B Complex Vitamins (VITAMIN B COMPLEX PO), B Complex, Disp: , Rfl:   •  calcium carbonate-vitamin D (OSCAL-D) 500 mg-200 units per tablet, Take 1 tablet by mouth 2 (two) times a day with meals, Disp: , Rfl:   •  clopidogrel (PLAVIX) 75 mg tablet, Take 1 tablet (75 mg total) by mouth daily Do not start before October 15, 2022 , Disp: 60 tablet, Rfl: 0  •  hydrochlorothiazide (HYDRODIURIL) 25 mg tablet, Take 25 mg by mouth daily, Disp: , Rfl:   •  irbesartan (AVAPRO) 150 mg tablet, Take 150 mg by mouth every other day Alternating with 300mg dose, Disp: , Rfl:   •  irbesartan (AVAPRO) 300 mg tablet, Take 300 mg by mouth every other day, Disp: , Rfl:   •  Multiple Vitamin (MULTIVITAMIN ADULT PO), Take by mouth, Disp: , Rfl:     /80 (BP Location: Left arm, Patient Position: Sitting, Cuff Size: Adult)   Pulse 66   Ht 5' 3" (1 6 m)   Wt 79 8 kg (176 lb)   BMI 31 18 kg/m²          Physical Exam  Vitals and nursing note reviewed  Constitutional:       Appearance: She is normal weight  HENT:      Head: Normocephalic and atraumatic  Eyes:      Extraocular Movements: Extraocular movements intact  Neck:      Comments: Right neck incision intact with surgical glue  Mild swelling  No erythema, no dehiscence, no drainage  Cardiovascular:      Pulses: Normal pulses  Pulmonary:      Effort: Pulmonary effort is normal       Breath sounds: Normal breath sounds  Abdominal:      Palpations: Abdomen is soft  Musculoskeletal:         General: No swelling  Normal range of motion  Skin:     General: Skin is warm  Comments: Ecchymosis right flank   Neurological:      Mental Status: She is alert and oriented to person, place, and time  Cranial Nerves: No cranial nerve deficit  Psychiatric:         Thought Content:  Thought content normal

## 2022-10-27 NOTE — PATIENT INSTRUCTIONS
OK to take 1/2 Plavix for the next 7-10 days and see how your respond   If any acute changes in speech - slurred speech, facial drooping or one sided weakness go immediately to the ED  Follow up in 2 weeks for re evaluation

## 2022-10-27 NOTE — ASSESSMENT & PLAN NOTE
55-year-old female with HTN, HLD, hypothyroid, breast ca s/p lumpectomy, anterior communicating artery aneurysm, asymptomatic high-grade right carotid stenosis admitted electively for s/p right carotid eversion endarterectomy with bovine patch angioplasty by Dr Lori Mata 10/13/22  Patient presents the office for postoperative visit   -Right neck incision intact with surgical glue, mild swelling  No dehiscence or drainage  No eyrthema  -Intermittent pulling at incision  No significant pain   -Patient's main concern is bruising and labile/ "drugged" feeling since surgery  Patient feels related to Plavix  Unclear if  this is related to Plavix other than bruising  Could represent anxiety related to medication though patient does not feel this is anxiety  Patient does have sensitivities to medications   On exam cranial nerves grossly intact with no focal deficit  -BP is adequately controlled  -Will trial 1/2 dose of Plavix and ASA 81mg for the next 7-10 days and see if there is a notable difference and may further decrease Plavix 1/2 tablet every other day   -Follow up in the office in 2 weeks to recheck  -Post operative carotid duplex in 3 months

## 2022-11-09 NOTE — PROGRESS NOTES
Assessment/Plan:    Carotid stenosis, right  17-year-old female with HTN, HLD, hypothyroid, breast ca s/p lumpectomy, anterior communicating artery aneurysm, asymptomatic high-grade right carotid stenosis admitted electively for s/p right carotid eversion endarterectomy with bovine patch angioplasty by Dr Jose Wei 10/13/22  Patient presents the office for 2nd postoperative visit   -Right neck incision well healed   -Patient did not tolerate daily full-dose Plavix due to grogginess   -Symptoms completely resolved with half dose   -Continue half dosing for the next one month   -carotid duplex in 3 months   -Follow up with Dr Jose Wei after duplex       Diagnoses and all orders for this visit:    Carotid stenosis, right          Subjective:      Patient ID: Keaton Diamond is a 62 y o  female  Patient present for a CEA post op done on 10/13/22  Patient denies any pain, fever, chills, hoarseness, sore throat or trouble swallowing  Patient is currently taking ASA, Atorvastatin and Plavix  HPI  17-year-old female with HTN, HLD, hypothyroid, breast ca s/p lumpectomy, anterior communicating artery aneurysm, asymptomatic high-grade right carotid stenosis admitted electively for s/p right carotid eversion endarterectomy with bovine patch angioplasty by Dr Jose Wei 10/13/22  Patient presents the office for 2nd postoperative visit   Patient felt groggy and sedated   The following portions of the patient's history were reviewed and updated as appropriate: allergies, current medications, past family history, past medical history, past social history, past surgical history and problem list   Review of systems reviewed    Review of Systems   Constitutional: Negative  HENT: Negative  Eyes: Negative  Respiratory: Negative  Cardiovascular: Negative  Gastrointestinal: Negative  Endocrine: Negative  Genitourinary: Negative  Musculoskeletal: Negative  Skin: Negative      Allergic/Immunologic: Negative  Neurological: Negative  Hematological: Negative  Psychiatric/Behavioral: Negative  Objective:  I have reviewed and made appropriate changes to the review of systems input by the medical assistant      Vitals:    11/10/22 1255   BP: 108/60   BP Location: Left arm   Patient Position: Sitting   Cuff Size: Adult   Pulse: 92   Weight: 79 6 kg (175 lb 6 4 oz)   Height: 5' 3" (1 6 m)       Patient Active Problem List   Diagnosis   • Carotid stenosis, right   • HTN (hypertension)   • Hypothyroid   • Hyperlipidemia   • Vitamin D deficiency   • Obese       Past Surgical History:   Procedure Laterality Date   • BREAST CYST EXCISION Left 2001    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   • BREAST LUMPECTOMY Left 04/2001    DCIS   • BREAST SURGERY     • COLONOSCOPY     • HIP SURGERY Right     1966, 1975, 1976    • [place] THROMBOENDARTECTMY NECK,NECK INCIS Right 10/13/2022    Procedure: ENDARTERECTOMY ARTERY CAROTID (Eversion) WITH BOVINE PATCH ANGIOPLASTY;  Surgeon: Allan Loaiza DO;  Location: [facility];  Service: Vascular       Family History   Problem Relation Age of Onset   • Other Mother         Carotid stenosis, bilateral    • Heart disease Mother    • Hypertension Mother    • Lung cancer Father         smoker   • Liver cancer Father    • Hypertension Father    • No Known Problems Sister    • No Known Problems Sister    • Heart attack Maternal Grandmother    • Thyroid disease Maternal Grandmother    • No Known Problems Maternal Grandfather    • No Known Problems Paternal Grandmother    • No Known Problems Paternal Grandfather    • Hypertension Brother    • No Known Problems Brother    • No Known Problems Maternal Aunt    • No Known Problems Maternal Aunt    • Cancer Paternal Aunt        Social History     Socioeconomic History   • Marital status: /Civil Union     Spouse name: Not on file   • Number of children: Not on file   • Years of education: Not on file   • Highest education level: Not on file   Occupational History   • Not on file   Tobacco Use   • Smoking status: Former   • Smokeless tobacco: Never   • Tobacco comments:     Smoked 20yrs up to 1ppd    Quit in 2008   Vaping Use   • Vaping Use: Never used   Substance and Sexual Activity   • Alcohol use: Yes     Comment: Occasional    • Drug use: Never   • Sexual activity: Not Currently     Partners: Male     Birth control/protection: Post-menopausal   Other Topics Concern   • Not on file   Social History Narrative   • Not on file     Social Determinants of Health     Financial Resource Strain: Not on file   Food Insecurity: Not on file   Transportation Needs: Not on file   Physical Activity: Not on file   Stress: Not on file   Social Connections: Not on file   Intimate Partner Violence: Not on file   Housing Stability: Not on file       Allergies   Allergen Reactions   • Latex Rash   • Other Rash     Adhesives         Current Outpatient Medications:   •  amoxicillin (AMOXIL) 500 MG tablet, Take 500 mg by mouth 2 (two) times a day, Disp: , Rfl:   •  [place] Thyroid 120 MG tablet, Take 120 mg by mouth every other day, Disp: , Rfl:   •  ARMOUR THYROID 90 MG tablet, TAKE 1 TABLET DAILY (Patient taking differently: Take 90 mg by mouth every other day), Disp: 30 tablet, Rfl: 0  •  Ascorbic Acid (VITAMIN C) 1000 MG tablet, Take 500 mg by mouth daily, Disp: , Rfl:   •  aspirin 81 MG tablet, Take 1 tablet by mouth daily, Disp: , Rfl:   •  atorvastatin (LIPITOR) 20 mg tablet, Take 1 tablet (20 mg total) by mouth daily with dinner, Disp: 90 tablet, Rfl: 0  •  B Complex Vitamins (VITAMIN B COMPLEX PO), B Complex, Disp: , Rfl:   •  calcium carbonate-vitamin D (OSCAL-D) 500 mg-200 units per tablet, Take 1 tablet by mouth 2 (two) times a day with meals, Disp: , Rfl:   •  clopidogrel (PLAVIX) 75 mg tablet, Take 1 tablet (75 mg total) by mouth daily Do not start before October 15, 2022 , Disp: 60 tablet, Rfl: 0  • hydrochlorothiazide (HYDRODIURIL) 25 mg tablet, Take 25 mg by mouth daily, Disp: , Rfl:   •  irbesartan (AVAPRO) 150 mg tablet, Take 150 mg by mouth every other day Alternating with 300mg dose, Disp: , Rfl:   •  irbesartan (AVAPRO) 300 mg tablet, Take 300 mg by mouth every other day, Disp: , Rfl:   •  Multiple Vitamin (MULTIVITAMIN ADULT PO), Take by mouth, Disp: , Rfl:       /60 (BP Location: Left arm, Patient Position: Sitting, Cuff Size: Adult)   Pulse 92   Ht 5' 3" (1 6 m)   Wt 79 6 kg (175 lb 6 4 oz)   BMI 31 07 kg/m²          Physical Exam  Vitals and nursing note reviewed  Exam conducted with a chaperone present  Constitutional:       Appearance: Normal appearance  HENT:      Head: Normocephalic and atraumatic  Eyes:      Extraocular Movements: Extraocular movements intact  Neck:      Comments: Neck incision well healed  Pulmonary:      Effort: Pulmonary effort is normal    Musculoskeletal:         General: Normal range of motion  Skin:     General: Skin is warm  Neurological:      General: No focal deficit present  Mental Status: She is alert and oriented to person, place, and time     Psychiatric:         Behavior: Behavior normal

## 2022-11-10 ENCOUNTER — OFFICE VISIT (OUTPATIENT)
Dept: VASCULAR SURGERY | Facility: CLINIC | Age: 57
End: 2022-11-10

## 2022-11-10 VITALS
DIASTOLIC BLOOD PRESSURE: 60 MMHG | BODY MASS INDEX: 31.08 KG/M2 | HEIGHT: 63 IN | SYSTOLIC BLOOD PRESSURE: 108 MMHG | HEART RATE: 92 BPM | WEIGHT: 175.4 LBS

## 2022-11-10 DIAGNOSIS — I65.21 CAROTID STENOSIS, RIGHT: Primary | ICD-10-CM

## 2022-11-17 NOTE — ASSESSMENT & PLAN NOTE
59-year-old female with HTN, HLD, hypothyroid, breast ca s/p lumpectomy, anterior communicating artery aneurysm, asymptomatic high-grade right carotid stenosis admitted electively for s/p right carotid eversion endarterectomy with bovine patch angioplasty by Dr Ana Lilia Jenkins 10/13/22    Patient presents the office for 2nd postoperative visit   -Right neck incision well healed   -Patient did not tolerate daily full-dose Plavix due to grogginess   -Symptoms completely resolved with half dose   -Continue half dosing for the next one month   -carotid duplex in 3 months   -Follow up with Dr Ana Lilia Jenkins after duplex

## 2022-11-21 ENCOUNTER — TELEPHONE (OUTPATIENT)
Dept: VASCULAR SURGERY | Facility: CLINIC | Age: 57
End: 2022-11-21

## 2022-11-21 NOTE — TELEPHONE ENCOUNTER
This is a reminder; patient is due for 9 mo OV   Please call patient and schedule the following by the dates provided  Surgeon - [address] // Narda Eugene (NPI: [id number])  Date of Surgery - 10/13/22  All appointments must be scheduled by, 10/13/23  !!!! Patient must be scheduled for their 9-month office visit before the follow-up window close date !!!!    ROBINA/ CEA VQI Protocol  patients must be scheduled for the following    [x] 3-month Doppler - done 1/27/23    [] 9-month Doppler Expected - scheduled for 07/29/23    [] 9-month OV after Doppler - scheduled for 07/29/23          Scheduling Reminders  1  Insurance requires, 9-month doppler to be scheduled 6-months and 2-days after the 3-month doppler  2  Appointment notes MUST be entered in the following format:  a  VQI SmartPhrase NEEDED - VQI LTFU - (ROBINA or CEA) (Date of Surgery) (Surgeon's Initials) - CV Duplex (Date of Doppler)   3  If unable to schedule, a recall MUST be entered  >>Please route this encounter to Lio Ochoa, Margarita Dawson, and Kayla Odell  <<

## 2022-11-25 ENCOUNTER — TELEPHONE (OUTPATIENT)
Dept: RADIOLOGY | Facility: HOSPITAL | Age: 57
End: 2022-11-25

## 2022-11-25 NOTE — PRE-PROCEDURE INSTRUCTIONS
Pre-procedure Instructions for Interventional Radiology  [address] [place] [address] Dr  [place] [address] [phone]    You are scheduled for a/an Cerebral Angiogram     On Friday 12/2/22    Your tentative arrival time is 0700  Short stay will notify you the day before your procedure with the exact arrival time and the location to arrive  To prepare for your procedure:  1  Please arrange for someone to drive you home after the procedure and stay with you until the next morning if you are instructed to do so  This is typically for patients receiving some type of sedative or anesthetic for the procedure  2  DO NOT EAT OR DRINK ANYTHING after midnight on the evening before your procedure including candy & gum   3  ONLY SIPS OF WATER with your medications are allowed on the morning of your procedure  4  TAKE ALL OF YOUR REGULAR MEDICATIONS THE MORNING OF YOUR PROCEDURE with sips of water! We may call you to stop some of your blood sugar, blood pressure and blood thinning medications depending on the procedure  Please take all of these medications unless we instruct you to stop them  5  If you have an allergy to x-ray dye, please contact Interventional Radiology for an x-ray dye preparation which usually consists of an oral steroid and Benadryl  The day of your procedure:  1  Bring a list of the medications you take at home  2  Bring medications you take for breathing problems (such as inhalers), medications for chest pain, or both  3  Bring a case for your glasses or contacts  4  Bring your insurance card and a form of photo ID   5  Please leave all valuables such as credit cards and jewelry at home  6  Report to the registration desk in the main lobby at the [facility]  Ask to be directed to [facility]  7  While your procedure is being performed, your family may wait in the Radiology Waiting Room on the 1st floor in Radiology    if they need to leave, they may provide a number to be called following the procedure  8  Be prepared to stay overnight just in case  Sometimes procedures will indicate the need for further observation or treatment  9  If you are scheduled for a follow-up visit with the Interventional Radiologist after your procedure, you will be called with a date and time  10  No Covid Vaccine  Vaccine encouraged      Special Instructions (Medications to stop taking before your procedure etc ):

## 2022-12-02 ENCOUNTER — ANESTHESIA (OUTPATIENT)
Dept: RADIOLOGY | Facility: HOSPITAL | Age: 57
End: 2022-12-02

## 2022-12-02 ENCOUNTER — ANESTHESIA EVENT (OUTPATIENT)
Dept: RADIOLOGY | Facility: HOSPITAL | Age: 57
End: 2022-12-02

## 2022-12-02 ENCOUNTER — HOSPITAL ENCOUNTER (OUTPATIENT)
Dept: RADIOLOGY | Facility: HOSPITAL | Age: 57
Discharge: HOME | End: 2022-12-02
Attending: NEUROLOGICAL SURGERY

## 2022-12-02 VITALS
WEIGHT: 174 LBS | DIASTOLIC BLOOD PRESSURE: 78 MMHG | TEMPERATURE: 98 F | HEIGHT: 63 IN | BODY MASS INDEX: 30.83 KG/M2 | SYSTOLIC BLOOD PRESSURE: 120 MMHG | HEART RATE: 82 BPM | RESPIRATION RATE: 18 BRPM | OXYGEN SATURATION: 100 %

## 2022-12-02 DIAGNOSIS — I67.1 ANTERIOR COMMUNICATING ARTERY ANEURYSM: ICD-10-CM

## 2022-12-02 RX ORDER — IODIXANOL 320 MG/ML
200 INJECTION, SOLUTION INTRAVASCULAR
Status: COMPLETED | OUTPATIENT
Start: 2022-12-02 | End: 2022-12-02

## 2022-12-02 RX ORDER — HEPARIN SODIUM 1000 [USP'U]/ML
INJECTION, SOLUTION INTRAVENOUS; SUBCUTANEOUS AS NEEDED
Status: COMPLETED | OUTPATIENT
Start: 2022-12-02 | End: 2022-12-02

## 2022-12-02 RX ORDER — VERAPAMIL HYDROCHLORIDE 2.5 MG/ML
INJECTION, SOLUTION INTRAVENOUS AS NEEDED
Status: COMPLETED | OUTPATIENT
Start: 2022-12-02 | End: 2022-12-02

## 2022-12-02 RX ORDER — SODIUM CHLORIDE 9 MG/ML
75 INJECTION, SOLUTION INTRAVENOUS CONTINUOUS
Status: DISCONTINUED | OUTPATIENT
Start: 2022-12-02 | End: 2022-12-03 | Stop reason: HOSPADM

## 2022-12-02 RX ORDER — NITROGLYCERIN 20 MG/100ML
INJECTION INTRAVENOUS AS NEEDED
Status: COMPLETED | OUTPATIENT
Start: 2022-12-02 | End: 2022-12-02

## 2022-12-02 RX ORDER — LIDOCAINE HYDROCHLORIDE 10 MG/ML
INJECTION, SOLUTION EPIDURAL; INFILTRATION; INTRACAUDAL; PERINEURAL AS NEEDED
Status: COMPLETED | OUTPATIENT
Start: 2022-12-02 | End: 2022-12-02

## 2022-12-02 RX ORDER — FENTANYL CITRATE 50 UG/ML
INJECTION, SOLUTION INTRAMUSCULAR; INTRAVENOUS AS NEEDED
Status: DISCONTINUED | OUTPATIENT
Start: 2022-12-02 | End: 2022-12-02

## 2022-12-02 RX ADMIN — FENTANYL CITRATE 25 MCG: 50 INJECTION INTRAMUSCULAR; INTRAVENOUS at 08:36

## 2022-12-02 RX ADMIN — HEPARIN SODIUM 4000 UNITS: 1000 INJECTION INTRAVENOUS; SUBCUTANEOUS at 08:36

## 2022-12-02 RX ADMIN — LIDOCAINE HYDROCHLORIDE 3 ML: 10 INJECTION, SOLUTION EPIDURAL; INFILTRATION; INTRACAUDAL; PERINEURAL at 08:35

## 2022-12-02 RX ADMIN — FENTANYL CITRATE 25 MCG: 50 INJECTION INTRAMUSCULAR; INTRAVENOUS at 08:30

## 2022-12-02 RX ADMIN — NITROGLYCERIN 400 MCG: 20 INJECTION INTRAVENOUS at 08:36

## 2022-12-02 RX ADMIN — NITROGLYCERIN 600 MCG: 20 INJECTION INTRAVENOUS at 08:35

## 2022-12-02 RX ADMIN — LIDOCAINE HYDROCHLORIDE 1 ML: 10 INJECTION, SOLUTION EPIDURAL; INFILTRATION; INTRACAUDAL; PERINEURAL at 08:36

## 2022-12-02 RX ADMIN — IODIXANOL 120 ML: 320 INJECTION, SOLUTION INTRAVASCULAR at 09:49

## 2022-12-02 RX ADMIN — SODIUM CHLORIDE: 0.9 INJECTION, SOLUTION INTRAVENOUS at 07:58

## 2022-12-02 RX ADMIN — VERAPAMIL HYDROCHLORIDE 5 MG: 2.5 INJECTION INTRAVENOUS at 08:36

## 2022-12-02 NOTE — ANESTHESIA PREPROCEDURE EVALUATION
Procedure:  IR CEREBRAL ANGIOGRAPHY    Relevant Problems   CARDIO   (+) HTN (hypertension)   (+) Hyperlipidemia      ENDO   (+) Hypothyroid      Other   (+) Obese        Physical Exam    Airway    Mallampati score: III  TM Distance: >3 FB  Neck ROM: full     Dental   No notable dental hx     Cardiovascular      Pulmonary      Other Findings        Anesthesia Plan  ASA Score- 3     Anesthesia Type- IV sedation with anesthesia with ASA Monitors  Additional Monitors:   Airway Plan:     Comment: I discussed the risks and benefits of light IV sedation anesthesia including anticipated awareness and the possibility of the need to convert to general anesthesia  Plan Factors-Exercise tolerance (METS): >4 METS  Exercise comment: Able to climb two flights of stairs without cardiopulmonary limitation  Chart reviewed  Existing labs reviewed  Patient summary reviewed  Patient is not a current smoker (Quit in 2008)  Patient did not smoke on day of surgery  Induction- intravenous  Postoperative Plan-     Informed Consent- Anesthetic plan and risks discussed with patient

## 2022-12-02 NOTE — H&P
Patient seen and examined independently  Clinic note from 9/19/2022 remains current  Patient is not on any new medications and has not had any new medical problems  /81 (BP Location: Right arm)   Pulse 80   Temp 98 °F (36 7 °C) (Oral)   Resp 18   Ht 5' 3" (1 6 m)   Wt 78 9 kg (174 lb)   SpO2 100%   BMI 30 82 kg/m²  On exam, patient is neurologically intact  Heart rate is regular  Breath sounds are clear    Plan to proceed with diagnostic cerebral arteriogram to better delineate ACoA aneurysm

## 2022-12-02 NOTE — OP NOTE
OPERATIVE REPORT  PATIENT NAME: Homer Nuñez     :  1965  MRN: [id number]   Pt Location: Interventional radiology    SURGERY DATE: 22     Preop Diagnosis:  1  Incidental aneurysm    Postop Diagnosis  1  Incidental aneurysm    Procedure:  Use of ultrasound  Right radial arteriogram   Right Common Carotid Arteriogram  Right Internal Carotid Arteriogram  3D rotational arteriogram of right internal carotid artery with post processed images reviewed at a separate workstation  Left Common Carotid Arteriogram  Left Internal Carotid Arteriogram  Right Vertebral Artery Arteriogram    Surgeon:   Rod Bender MD    Specimen(s):  None    Estimated Blood Loss:   None    Drains:  None    Anesthesia Type:   Monitored Anesthesia Care     Complications:  None    Operative Indications:  Homer Nuñez  is a very pleasant 62 y o  female who was discovered to have an incidental aneurysm the context stroke  After discussing the risks and benefits of a diagnostic cerebral arteriogram including bleeding, stroke, groin hematoma, and death the patient elected to proceed  Procedure Details:  After obtaining written informed consent, the patient was brought into the operating room and moved to the OR table in supine fashion  The right radial artery was prepped and draped in the usual sterile fashion  Monitored anesthesia care was induced  A surgical time-out was performed  3 cc mixture of lidocaine and 400 mcg of nitroglycerin was injected immediately above the right radial artery  Ultrasound guidance under real-time visualization was used to guide the micro puncture needle into the right radial artery  Next, a 5 [employer] [place] tapered sheath was then placed  Next and infusion of 300 mcg of nitroglycerin, 4000 units heparin, and 5 mg of verapamil were slowly injected intra-arterially through the right radial sheath  Radial artery arteriogram then performed   Next a 5 [employer] [place]isha Alvarado glide catheter was advanced and reshaped  The right common carotid artery was then catheterized  AP lateral and oblique images of the right cervical carotid were obtained  The catheter was then advanced and the right internal carotid artery was then catheterized  AP lateral and magnified oblique images of the right intracranial carotid circulation were obtained  A 3D rotational arteriogram of the right internal carotid artery was then performed post processed images and reviewed at a separate workstation    The catheter was then withdrawn into the aortic arch and advanced into the left common carotid artery  AP lateral and oblique images of the left cervical carotid were obtained  The catheter was then advanced and the left internal carotid artery was then catheterized  AP lateral and magnified oblique images of the left intracranial carotid circulation were obtained  Right vertebral artery was catheterized  Trans facial and lateral and oblique images of the right vertebral artery circulation were then obtained  The catheter was then withdrawn from the body and a TR compression band was placed  There was appropriate hemostasis  The patient was then awoken from monitored anesthesia care and found to be in baseline neurologic condition  All sponge and needle counts were correct  INTERPRETATION OF ANGIOGRAPHIC FINDINGS:   1  The Left common carotid circulation reveals antegrade flow into the internal and external carotid arteries  There is no hemodynamically significant carotid stenosis as defined by NASCET criteria  2  The Left internal carotid artery circulation reveals antegrade flow into the middle cerebral artery  There is no evidence of a left A1  There is a large posterior communicating artery with reflux into the posterior circulation  There is no evidence of aneurysm, AVM or vascular malformation  The capillary and venous phases are unremarkable       3  The Right common carotid circulation reveals antegrade flow into the internal and external carotid arteries  There is no hemodynamically significant carotid stenosis as defined by NASCET criteria  There is evidence of prior endarterectomy  4  The Right internal carotid artery circulation reveals antegrade flow into the middle cerebral and anterior cerebral arteries  There is a patent anterior communicating artery  There is a patent posterior communicating artery  The anterior communicating artery complex is trifurcated  There is an irregular 4 x 6 mm anterior communicating artery aneurysm with approximately 3 mm neck  The capillary and venous phases are unremarkable  5  3D rotational arteriogram of the right internal carotid artery better demonstrates 3D anatomy and angio architecture of the irregular aneurysm as well as appropriate measurements  6  The Right vertebral intracranial circulation reveals retrograde reflux down the contralateral vertebral artery  Both PICAs are well visualized  Antegrade flow is present intoall the posterior circulation branches  There are no AVMs or aneurysms  The capillary and venous phases are unremarkable  Impression:  4 x 6 mm anterior communicating artery aneurysm with a broad neck incorporating a trifurcated anterior communicating artery complex      Patient Disposition:  APU    SIGNATURE: Alberta Blair MD  DATE: 12/02/22   TIME: 9:24 AM

## 2022-12-02 NOTE — SEDATION DOCUMENTATION
Diagnostic cerebral angiogram with right radial access performed by Dr Rowdy Rodriguez without complications  Anesthesia present throughout case  Patient tolerated well  TR band applied to right radial at 0903 and 17 mL  IR Procedure Bedrest Start Time is 3933  Report called to short stay

## 2022-12-02 NOTE — DISCHARGE INSTRUCTIONS
Today, you underwent a diagnostic cerebral angiogram under the care of Dr Hamilton Reyes for evaluation of aneurysm  ? The following instructions will help you care for yourself, or be cared for upon your return home today  These are guidelines for your care right after your surgery only  ? Notify Your Doctor or Nurse if you have any of the following:  ? SYMPTOMS OF WOUND INFECTION--   Increased pain in or around the incision   Swelling around the incision  Any drainage from the incision  Incision separates or opens up  Warmth in the tissues around the incision  Redness or tenderness on the skin near the incision   Fever (temperature greater than 101 degrees F)   ? NEUROLOGICAL CHANGES--  Change in alertness  Increased sleepiness   Nausea and vomiting   New onset of numbness or weakness in arms or legs   New problems with your bowels or bladder  New or worse problems with balance or walking  Seizures, new or worsening  ? UNRELIEVED HEADACHE PAIN--  New or increased pain unrelieved with pain medications   Pain associated with nausea and vomiting   Pain associated with other symptoms  ? QUESTIONS OR PROBLEMS--  Any questions or problems that you are unsure about  Wound Care:  Keep Incision Clean and Dry   You may shower daily, but do not soak incision  Pat dry after showering  No tub baths, soaking, swimming for 1 week after angiogram    You do not need to cover the incision  Mild to moderate bruising and tenderness to the site is expected and may last up to 1-2 weeks after your procedure  ?  A closure device was placed at the catheter insertion site  This is MRI compatible  Remove the dressing 24 hours after your procedure  If your groin site is bleeding, apply firm pressure for 10 minutes  Reinforce dressing rather than removing and checking frequently  If continues to bleed through the dressing after 1 hour, contact your neurosurgeon's office  Anticipatory Education:  ?   PAIN MED W/ Acetaminophen (Tylenol)  --IF a prescription for pain medicine has been sent home with you:  --Narcotic pain medication may cause constipation  Be sure to take stool softeners or laxatives while you are on narcotic pain medication  --Do not drive after taking prescription pain medicine  ?  If this medicine is too strong, or no longer necessary, or we did NOT recommend/prescribe oral narcotics, you may take:   - Tylenol Extra-strength/Acetaminophen, 2 tablets every 4-6 hours as needed for mild pain  DO NOT TAKE MORE THAN 4000MG PER DAY from combined sources  NOTE: Remember to eat when taking pain medicines in order to avoid nausea  Watch for constipation  Eat plenty of fruits, vegetables, juices, and drink 6-8 glasses of water each day  Constipation: Stay active and drink at least 6-8 cups of fluid each day to prevent constipation  If you need a laxative or stool softener follow the package directions or consult with your local pharmacists if you have questions  ? After anesthesia, rest for 24 hours  Do not drive, drink alcohol beverages or make any important decisions during this time  General anesthesia may cause sore throat, jaw discomfort or muscle aches  These symptoms can last for one or two days  Activity: Please follow these instructions:  Advance your activity as you can tolerate  You may do light house work; nothing strenuous   You may walk all you want  You may go up and down the steps  Use the railing for support  Do not do excessive bending, straining or heavy lifting for 48 hours after your procedure  Do not drive or return to work until you are instructed   It is normal for your energy level and sleep patterns to change after surgery  Get extra sleep at night and take naps during the day to help you feel less tired  Take rest periods during the day  Complete recovery may take several weeks  ?  You may resume driving after 19-69 hours recovery  You may return to work after 48 hours of recovery  ?  Diet:  Your doctor has recommended that you follow these diet instructions at home  Refer to the patient education materials you received during your hospital stay  If you would like more nutrition counseling, ask your doctor about making an appointment with an outpatient dietitian  Resume your home diet  ? Medications:  Please resume your home medications as instructed  ? Home Supplies and Equipment:  none  Additional Contacts:  ? CONTACTS FOR NEUROSURGERY: You may call your neurosurgeon’s office if you have questions between 8:30 am and 4:30 pm  You may request to speak to the nurse practitioner who is available Monday through Friday  ?  For off hours or the weekend you may call your neurosurgeon's office to leave a message  Moderate Sedation   WHAT YOU NEED TO KNOW:   Moderate sedation, or conscious sedation, is medicine used during procedures to help you feel relaxed and calm  You will be awake and able to follow directions without anxiety or pain  You will remember little to none of the procedure  You may feel tired, weak, or unsteady on your feet after you get sedation  You may also have trouble concentrating or short-term memory loss  These symptoms should go away in 24 hours or less  DISCHARGE INSTRUCTIONS:   Call 911 or have someone else call for any of the following: You have sudden trouble breathing  You cannot be woken  Seek care immediately if:   You have a severe headache or dizziness  Your heart is beating faster than usual   Contact your healthcare provider if:   You have a fever  You have nausea or are vomiting for more than 8 hours after the procedure  Your skin is itchy, swollen, or you have a rash  You have questions or concerns about your condition or care  Self-care:   Have someone stay with you for 24 hours  This person can drive you to errands and help you do things around the house  This person can also watch for problems        Rest and do quiet activities for 24 hours  Do not exercise, ride a bike, or play sports  Stand up slowly to prevent dizziness and falls  Take short walks around the house with another person  Slowly return to your usual activities the next day  Do not drive or use dangerous machines or tools for 24 hours  You may injure yourself or others  Examples include a lawnmower, saw, or drill  Do not return to work for 24 hours if you use dangerous machines or tools for work  Do not make important decisions for 24 hours  For example, do not sign important papers or invest money  Drink liquids as directed  Liquids help flush the sedation medicine out of your body  Ask how much liquid to drink each day and which liquids are best for you  Eat small, frequent meals to prevent nausea and vomiting  Start with clear liquids such as juice or broth  If you do not vomit after clear liquids, you can eat your usual foods  Do not drink alcohol or take medicines that make you drowsy  This includes medicines that help you sleep and anxiety medicines  Ask your healthcare provider if it is safe for you to take pain medicine  Follow up with your healthcare provider as directed: Write down your questions so you remember to ask them during your visits  © 2017 [employer] [employer] Information is for End User's use only and may not be sold, redistributed or otherwise used for commercial purposes  All illustrations and images included in CareNotes® are the copyrighted property of A D [employer] M [employer]  or Alfie Flores  The above information is an  only  It is not intended as medical advice for individual conditions or treatments  Talk to your doctor, nurse or pharmacist before following any medical regimen to see if it is safe and effective for you

## 2022-12-02 NOTE — ANESTHESIA POSTPROCEDURE EVALUATION
Post-Op Assessment Note    CV Status:  Stable    Pain management: adequate     Mental Status:  Alert and awake   Hydration Status:  Euvolemic   PONV Controlled:  Controlled   Airway Patency:  Patent      Post Op Vitals Reviewed: Yes      Staff: CRNA   Comments: VSS report RN        No notable events documented      BP   113/91   Temp      Pulse 85   Resp      SpO2   100 RA

## 2022-12-05 ENCOUNTER — TELEPHONE (OUTPATIENT)
Dept: NEUROSURGERY | Facility: CLINIC | Age: 57
End: 2022-12-05

## 2022-12-05 NOTE — TELEPHONE ENCOUNTER
Completed post angio callback to Chung Ramirez at primary contact number  She is frustrated with the entire procedure as she feels she may not have been sedated enough and felt more thatn she should have  Albert Oseguera expresses a great deal of concern over the numerous IV sites placed during the procedure and reports bruising of her forearm and BL brachial region  She also states there was a lump initially at her radial puncture site that has since improved  Is not currently experiencing any numbness, tingling, or weakness in her hand  Reports mild headaches that seem to be improving  Advised that tylenol should provide adequate relief  Explained that she should contact the office if she experiences any pain, swelling, or drainage from the site, and report to the ER or call 911 if she experiences [facility], visual disturbance, of confusion/disorientation, slurred speech, ambulatory dysfunction  Reminded of post procedure follow-up scheduled on 12/21/2022  Patient appreciative of call

## 2022-12-21 ENCOUNTER — OFFICE VISIT (OUTPATIENT)
Dept: NEUROSURGERY | Facility: CLINIC | Age: 57
End: 2022-12-21

## 2022-12-21 VITALS
BODY MASS INDEX: 31.18 KG/M2 | SYSTOLIC BLOOD PRESSURE: 146 MMHG | WEIGHT: 176 LBS | TEMPERATURE: 97.8 F | RESPIRATION RATE: 16 BRPM | HEIGHT: 63 IN | DIASTOLIC BLOOD PRESSURE: 78 MMHG | HEART RATE: 84 BPM

## 2022-12-21 DIAGNOSIS — I65.21 CAROTID STENOSIS, RIGHT: ICD-10-CM

## 2022-12-21 DIAGNOSIS — I67.1 ANTERIOR COMMUNICATING ARTERY ANEURYSM: Primary | ICD-10-CM

## 2022-12-21 RX ORDER — CLOPIDOGREL BISULFATE 75 MG/1
75 TABLET ORAL DAILY
Qty: 60 TABLET | Refills: 0 | Status: SHIPPED | OUTPATIENT
Start: 2022-12-21

## 2022-12-21 RX ORDER — SODIUM CHLORIDE 9 MG/ML
75 INJECTION, SOLUTION INTRAVENOUS CONTINUOUS
OUTPATIENT
Start: 2022-12-21

## 2022-12-21 RX ORDER — ASPIRIN 325 MG
325 TABLET, DELAYED RELEASE (ENTERIC COATED) ORAL DAILY
Qty: 30 TABLET | Refills: 0 | Status: SHIPPED | OUTPATIENT
Start: 2022-12-21

## 2022-12-21 NOTE — PROGRESS NOTES
Patient Id: Kendra Hanccok is a 62 y o  female        Handedness: Right      Assessment/Plan:    Diagnoses and all orders for this visit:    Anterior communicating artery aneurysm  -     IR cerebral aneurysm coiling; Future    Carotid stenosis, right  -     clopidogrel (PLAVIX) 75 mg tablet; Take 1 tablet (75 mg total) by mouth daily Begin one week prior to procedure  -     aspirin (ECOTRIN) 325 mg EC tablet; Take 1 tablet (325 mg total) by mouth daily Begin one week prior to procedure  -     P2Y12 Platelet inhibitor; Future    Other orders  -     Diet NPO; Sips with meds; Standing  -     Nursing communication Apply gown prior to procedure; Standing  -     Have Patient Void On Call to Procedure Room; Standing  -     Insert and Maintain IV; Standing  -     sodium chloride 0 9 % infusion        Discussion Summary:   1  Incidental 4x6mm ACoA aneurysm status post angiogram 12/2/2022  We once again discussed the natural history of intracranial aneurysms and subarachnoid hemorrhage  We specifically discussed the risks associated with aneurysms based on size and location  Based on ISIUA and UCAS her risk is likely 0 5 %/year versus 0 57 annually, respectively  Given her age, family history, morphology of the aneurysm and other medical comorbidities I have recommended treatment of this aneurysm  We discussed both open surgical craniotomy and clipping as well as endovascular therapies including stent assisted coiling and woven endoluminal bridge  I have recommended trying the web device as a first-line treatment method  She is in agreement with this plan and understands the risks including bleeding, vascular injury, stroke paralysis and death  Regarding her dual antiplatelet therapy, she had previously been on Plavix and unable to tolerate full dose Plavix for an extended period of time due to systemic complications  I explained to her very carefully that the usage of dual antiplatelet therapy is not necessary  If she has a web but this will likely be discontinued after treatment  She is willing to try full dose aspirin and Plavix in this case understanding that she may require 6 to 6 weeks of treatment with this  If she is unable to tolerate her Plavix or we find that ineffective based on P2 Y 12 I may transition her to [address]  We will plan for platelet testing the Friday prior to the procedure  Radial artery was small and she had significant bruising complications after procedure  I will plan for femoral access  2  Family history of aneurysm  Given the presence of an aneurysm in her mother and her I recommend that her siblings be screened with an MRA      3  Carotid stenosis regarding right carotid endarterectomy  CEA in 2022  She is recovering well from this  Chief Complaint: Follow-up        HPI:   This is a very pleasant 49-year-old female who has been followed for her carotid stenosis for an extended period time  She was noted to have interval worsening stenosis and evaluated for carotid endarterectomy  As part of this evaluation a CT a of her neck was performed which demonstrated a 5 5 mm anterior communicating artery aneurysm  She is now status post formal arteriogram   She had complications due to bleeding and bruising in her bilateral forearms  She also has difficult IV access  She also was found to have a small radial artery  It took her several weeks to fully recover from this but she has no complaints currently  Her past medical history significant for hypertension, carotid stenosis  Her past surgical history significant for left leg surgery and right breast surgery      She is not allergic to any medications      Patient is  with no children , 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  She is a former smoker, denies any alcohol usage, a very long time ago did use drugs       Mother had brain aneurysm    Grandmother  suddenly of unknown cause     Review of systems obtained by the MA reviewed and updated below  Review of Systems   HENT: Negative for tinnitus  Eyes: Positive for visual disturbance (occular migraines- vision issues with that )  Respiratory: Negative for shortness of breath and wheezing  Cardiovascular: Negative for chest pain  Gastrointestinal: Negative  Genitourinary: Negative  Musculoskeletal: Negative for back pain, gait problem, myalgias and neck pain  Neurological: Positive for headaches (occular migraines , since the angiogram she was having frequent headaches that lasted a week but have subsided  )  Negative for dizziness, tremors, seizures, speech difficulty, weakness, light-headedness and numbness  Hematological: Bruises/bleeds easily (asa81 )  Physical Exam  Vitals:    12/21/22 1058   BP: 146/78   Pulse: 84   Resp: 16   Temp: 97 8 °F (36 6 °C)     She is well appearing  Affect is appropriate  Body mass index is 31 18 kg/m²  Jimmy Allan She is awake alert and oriented  Hearing and vision are grossly intact  Her pupils are equal round reactive to light  Her extraocular movements are intact  Her face is symmetric  Tongue is midline  Facial sensation is intact and symmetric throughout  Shoulder shrug is 5/5  There is no drift or dysmetria  She has full strength in her bilateral upper and lower extremities  She has normal muscle tone muscle bulk  Her biceps reflexes and patellar reflexes are 2+ and symmetric  Fermín sign negative bilaterally  Sensation intact to light touch and pinprick throughout  Her gait is normal      Her heart rate is regular  Normal respiratory effort  Abdomen nondistended  Radial pulses 2+       Minimal residual bruising on her bilateral forearms    The following portions of the patient's history were reviewed and updated as appropriate: allergies, current medications, past family history, past medical history, past social history, past surgical history and problem list     Active Ambulatory Problems     Diagnosis Date Noted   • Carotid stenosis, right 07/24/2017   • HTN (hypertension) 10/23/2019   • Hypothyroid 10/23/2019   • Hyperlipidemia 10/23/2019   • Vitamin D deficiency 11/06/2019   • Obese 10/13/2022     Resolved Ambulatory Problems     Diagnosis Date Noted   • No Resolved Ambulatory Problems     Past Medical History:   Diagnosis Date   • Aneurysm ([facility] [address] )    • Arthritis    • Breast cancer ([facility] [address] ) 2001   • Disease of thyroid gland    • Dizziness    • Hypertension    • Myocardial infarction [facility])    • Vitiligo        Past Surgical History:   Procedure Laterality Date   • BREAST CYST EXCISION Left 2001    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   • BREAST LUMPECTOMY Left 04/2001    DCIS   • BREAST SURGERY     • COLONOSCOPY     • HIP SURGERY Right     1966, 1975, 1976    • IR CEREBRAL ANGIOGRAPHY  12/2/2022   • NM THROMBOENDARTECTMY NECK,NECK INCIS Right 10/13/2022    Procedure: ENDARTERECTOMY ARTERY CAROTID (Eversion) WITH BOVINE PATCH ANGIOPLASTY;  Surgeon: Melly Sena DO;  Location: [facility];  Service: Vascular         Current Outpatient Medications:   •  Fleming Thyroid 120 MG tablet, Take 120 mg by mouth every other day, Disp: , Rfl:   •  ARMOUR THYROID 90 MG tablet, TAKE 1 TABLET DAILY (Patient taking differently: Take 90 mg by mouth every other day), Disp: 30 tablet, Rfl: 0  •  Ascorbic Acid (VITAMIN C) 1000 MG tablet, Take 500 mg by mouth daily, Disp: , Rfl:   •  aspirin (ECOTRIN) 325 mg EC tablet, Take 1 tablet (325 mg total) by mouth daily Begin one week prior to procedure, Disp: 30 tablet, Rfl: 0  •  aspirin 81 MG tablet, Take 1 tablet by mouth daily, Disp: , Rfl:   •  B Complex Vitamins (VITAMIN B COMPLEX PO), B Complex, Disp: , Rfl:   •  calcium carbonate-vitamin D (OSCAL-D) 500 mg-200 units per tablet, Take 1 tablet by mouth 2 (two) times a day with meals, Disp: , Rfl:   •  clopidogrel (PLAVIX) 75 mg tablet, Take 1 tablet (75 mg total) by mouth daily Begin one week prior to procedure, Disp: 60 tablet, Rfl: 0  •  hydrochlorothiazide (HYDRODIURIL) 25 mg tablet, Take 25 mg by mouth daily, Disp: , Rfl:   •  irbesartan (AVAPRO) 150 mg tablet, Take 150 mg by mouth every other day Alternating with 300mg dose, Disp: , Rfl:   •  irbesartan (AVAPRO) 300 mg tablet, Take 300 mg by mouth every other day, Disp: , Rfl:   •  Multiple Vitamin (MULTIVITAMIN ADULT PO), Take by mouth, Disp: , Rfl:   •  amoxicillin (AMOXIL) 500 MG tablet, Take 500 mg by mouth 2 (two) times a day, Disp: , Rfl:   •  atorvastatin (LIPITOR) 20 mg tablet, Take 1 tablet (20 mg total) by mouth daily with dinner (Patient not taking: Reported on 12/21/2022), Disp: 90 tablet, Rfl: 0    Results/Data: We reviewed her imaging in detail as well as her reports

## 2023-01-27 ENCOUNTER — HOSPITAL ENCOUNTER (OUTPATIENT)
Dept: RADIOLOGY | Facility: MEDICAL CENTER | Age: 58
Discharge: HOME | End: 2023-01-27

## 2023-01-27 DIAGNOSIS — I65.21 CAROTID STENOSIS, RIGHT: ICD-10-CM

## 2023-02-22 ENCOUNTER — OFFICE VISIT (OUTPATIENT)
Dept: VASCULAR SURGERY | Facility: CLINIC | Age: 58
End: 2023-02-22

## 2023-02-22 VITALS
HEART RATE: 78 BPM | SYSTOLIC BLOOD PRESSURE: 124 MMHG | OXYGEN SATURATION: 98 % | HEIGHT: 63 IN | BODY MASS INDEX: 31.25 KG/M2 | DIASTOLIC BLOOD PRESSURE: 76 MMHG | WEIGHT: 176.4 LBS

## 2023-02-22 DIAGNOSIS — Z98.890 HISTORY OF CAROTID ENDARTERECTOMY: Primary | ICD-10-CM

## 2023-02-22 DIAGNOSIS — I65.21 CAROTID STENOSIS, RIGHT: ICD-10-CM

## 2023-02-22 RX ORDER — ROSUVASTATIN CALCIUM 5 MG/1
TABLET, COATED ORAL
COMMUNITY
Start: 2022-12-14

## 2023-02-22 NOTE — ASSESSMENT & PLAN NOTE
Status post right carotid endarterectomy back in October  Patient presents for routine office visit to review surveillance carotid duplex  The endarterectomy site is widely patent  She does complain of some stiffness/tightness of the incision site  It appears that the incision has healed with minimal keloid scar formation  Continue surveillance with 6-month duplex

## 2023-02-22 NOTE — PROGRESS NOTES
Assessment/Plan:    Carotid stenosis, right  Status post right carotid endarterectomy back in October  Patient presents for routine office visit to review surveillance carotid duplex  The endarterectomy site is widely patent  She does complain of some stiffness/tightness of the incision site  It appears that the incision has healed with minimal keloid scar formation  Continue surveillance with 6-month duplex  Diagnoses and all orders for this visit:    History of carotid endarterectomy  -     VAS carotid complete study; Future    Carotid stenosis, right    Other orders  -     rosuvastatin (CRESTOR) 5 mg tablet          Subjective:      Patient ID: Jacqueline Coe is a 62 y o  female  Patient is here today to review results of a CV done 1/27/2023  Patient is s/p a right CEA done 10/13/2022  Patient c/o tightness and itching at the site of the right sided incision  Patient denies any headaches, dizziness, sudden loss of vision, trouble swallowing, slurred speech, TIA or Stroke symptoms  Patient is taking ASA 81 mg and Atorvastatin  Patient is a former smoker  Thank you returns to the office for routine office visit to review most recent surveillance carotid duplex  She underwent right carotid endarterectomy back in October  She denies any focal neurologic deficits  The following portions of the patient's history were reviewed and updated as appropriate: allergies, current medications, past family history, past medical history, past social history, past surgical history and problem list     Review of Systems   Constitutional: Negative  HENT: Negative  Eyes: Negative  Respiratory: Negative  Cardiovascular: Negative  Gastrointestinal: Negative  Endocrine: Negative  Genitourinary: Negative  Musculoskeletal: Positive for arthralgias and back pain  Skin: Negative  Allergic/Immunologic: Negative  Neurological: Negative  Hematological: Negative  Psychiatric/Behavioral: Negative  Objective:      /76 (BP Location: Left arm, Patient Position: Sitting, Cuff Size: Standard)   Pulse 78   Ht 5' 3" (1 6 m)   Wt 80 kg (176 lb 6 4 oz)   SpO2 98%   BMI 31 25 kg/m²          Physical Exam  Constitutional:       General: She is not in acute distress  Appearance: She is well-developed  HENT:      Head: Normocephalic and atraumatic  Eyes:      General: No scleral icterus  Conjunctiva/sclera: Conjunctivae normal    Neck:      Trachea: No tracheal deviation  Cardiovascular:      Rate and Rhythm: Normal rate  Pulmonary:      Effort: Pulmonary effort is normal       Breath sounds: Normal breath sounds  Abdominal:      General: There is no distension  Palpations: Abdomen is soft  There is no mass (no appreciable aortic pulsation/aneurysm)  Tenderness: There is no abdominal tenderness  There is no guarding or rebound  Musculoskeletal:         General: Normal range of motion  Cervical back: Normal range of motion and neck supple  Skin:     General: Skin is warm and dry  Comments: Right neck scar minimally raised (keloid scarring)   Neurological:      Mental Status: She is alert and oriented to person, place, and time  Psychiatric:         Mood and Affect: Mood normal          Behavior: Behavior normal          Judgment: Judgment normal              Carotid duplex:  CONCLUSION:  Impression  RIGHT:  Widely patent internal carotid artery and endarterectomy site  Vertebral artery flow is antegrade  There is no significant subclavian artery  disease  LEFT:  There is <50% stenosis noted in the internal carotid artery  Plaque is  heterogenous and irregular  Vertebral artery flow is antegrade  There is no significant subclavian artery  disease  In comparison to the study of 5/16/2022, there is improvement on the right s/p  revascularization

## 2023-03-06 ENCOUNTER — TELEPHONE (OUTPATIENT)
Dept: RADIOLOGY | Facility: HOSPITAL | Age: 58
End: 2023-03-06

## 2023-03-06 ENCOUNTER — TELEPHONE (OUTPATIENT)
Dept: NEUROSURGERY | Facility: CLINIC | Age: 58
End: 2023-03-06

## 2023-03-06 DIAGNOSIS — I67.1 ANTERIOR COMMUNICATING ARTERY ANEURYSM: Primary | ICD-10-CM

## 2023-03-06 NOTE — TELEPHONE ENCOUNTER
Contacted Odilon Trivedi to discuss starting her DAPT in preparation for her upcoming angio/intervention  Explained that she should discontinue ASA81 and begin taking  mg and Plavix 75 mg today and continue to take this medication following the procedure until advised otherwise by the physician  She confirmed preferred pharmacy  P2Y12 level should be drawn the Friday before the procedure at [facility] or [address] [place] [address]  Will receive a call from PAT to go over medication list and NPO status and IR/ASC with official arrival time  She stated an understanding

## 2023-03-10 ENCOUNTER — APPOINTMENT (OUTPATIENT)
Dept: LAB | Facility: CLINIC | Age: 58
End: 2023-03-10

## 2023-03-10 DIAGNOSIS — I65.21 CAROTID STENOSIS, RIGHT: ICD-10-CM

## 2023-03-10 DIAGNOSIS — I67.1 ANTERIOR COMMUNICATING ARTERY ANEURYSM: ICD-10-CM

## 2023-03-10 LAB
APTT PPP: 27 SECONDS (ref 23–37)
INR PPP: 0.95 (ref 0.84–1.19)
PA ADP BLD-ACNC: 33 PRU (ref 194–418)
PROTHROMBIN TIME: 12.8 SECONDS (ref 11.6–14.5)

## 2023-03-13 ENCOUNTER — ANESTHESIA EVENT (INPATIENT)
Dept: RADIOLOGY | Facility: HOSPITAL | Age: 58
End: 2023-03-13

## 2023-03-13 ENCOUNTER — ANESTHESIA (INPATIENT)
Dept: RADIOLOGY | Facility: HOSPITAL | Age: 58
End: 2023-03-13

## 2023-03-13 ENCOUNTER — HOSPITAL ENCOUNTER (INPATIENT)
Dept: RADIOLOGY | Facility: HOSPITAL | Age: 58
LOS: 1 days | Discharge: HOME | End: 2023-03-14
Attending: EMERGENCY MEDICINE | Admitting: NEUROLOGICAL SURGERY

## 2023-03-13 DIAGNOSIS — I67.1 ANTERIOR COMMUNICATING ARTERY ANEURYSM: Primary | ICD-10-CM

## 2023-03-13 LAB
KCT BLD-ACNC: 151 SEC (ref 89–137)
KCT BLD-ACNC: 194 SEC (ref 89–137)
KCT BLD-ACNC: 213 SEC (ref 89–137)
PLATELET # BLD AUTO: 228 THOUSANDS/UL (ref 149–390)
PMV BLD AUTO: 10.1 FL (ref 8.9–12.7)
SPECIMEN SOURCE: ABNORMAL

## 2023-03-13 PROCEDURE — B30RZZZ PLAIN RADIOGRAPHY OF INTRACRANIAL ARTERIES: ICD-10-PCS | Performed by: NEUROLOGICAL SURGERY

## 2023-03-13 PROCEDURE — 03VG3DZ RESTRICTION OF INTRACRANIAL ARTERY WITH INTRALUMINAL DEVICE, PERCUTANEOUS APPROACH: ICD-10-PCS | Performed by: NEUROLOGICAL SURGERY

## 2023-03-13 PROCEDURE — B306ZZZ PLAIN RADIOGRAPHY OF RIGHT INTERNAL CAROTID ARTERY: ICD-10-PCS | Performed by: NEUROLOGICAL SURGERY

## 2023-03-13 PROCEDURE — B307ZZZ PLAIN RADIOGRAPHY OF LEFT INTERNAL CAROTID ARTERY: ICD-10-PCS | Performed by: NEUROLOGICAL SURGERY

## 2023-03-13 PROCEDURE — 3E053GC INTRODUCTION OF OTHER THERAPEUTIC SUBSTANCE INTO PERIPHERAL ARTERY, PERCUTANEOUS APPROACH: ICD-10-PCS | Performed by: NEUROLOGICAL SURGERY

## 2023-03-13 RX ORDER — DEXAMETHASONE SODIUM PHOSPHATE 10 MG/ML
INJECTION, SOLUTION INTRAMUSCULAR; INTRAVENOUS AS NEEDED
Status: DISCONTINUED | OUTPATIENT
Start: 2023-03-13 | End: 2023-03-13

## 2023-03-13 RX ORDER — HEPARIN SODIUM 1000 [USP'U]/ML
INJECTION, SOLUTION INTRAVENOUS; SUBCUTANEOUS AS NEEDED
Status: DISCONTINUED | OUTPATIENT
Start: 2023-03-13 | End: 2023-03-13

## 2023-03-13 RX ORDER — OXYCODONE HYDROCHLORIDE 5 MG/1
2.5 TABLET ORAL EVERY 4 HOURS PRN
Status: DISCONTINUED | OUTPATIENT
Start: 2023-03-13 | End: 2023-03-14 | Stop reason: HOSPADM

## 2023-03-13 RX ORDER — HYDROMORPHONE HCL IN WATER/PF 6 MG/30 ML
0.2 PATIENT CONTROLLED ANALGESIA SYRINGE INTRAVENOUS
Status: DISCONTINUED | OUTPATIENT
Start: 2023-03-13 | End: 2023-03-13

## 2023-03-13 RX ORDER — HYDROCHLOROTHIAZIDE 25 MG/1
25 TABLET ORAL DAILY
Status: DISCONTINUED | OUTPATIENT
Start: 2023-03-14 | End: 2023-03-14 | Stop reason: HOSPADM

## 2023-03-13 RX ORDER — METHOCARBAMOL 500 MG/1
500 TABLET, FILM COATED ORAL EVERY 6 HOURS PRN
Status: DISCONTINUED | OUTPATIENT
Start: 2023-03-13 | End: 2023-03-14 | Stop reason: HOSPADM

## 2023-03-13 RX ORDER — ONDANSETRON 2 MG/ML
INJECTION INTRAMUSCULAR; INTRAVENOUS AS NEEDED
Status: DISCONTINUED | OUTPATIENT
Start: 2023-03-13 | End: 2023-03-13

## 2023-03-13 RX ORDER — NITROGLYCERIN 20 MG/100ML
INJECTION INTRAVENOUS AS NEEDED
Status: COMPLETED | OUTPATIENT
Start: 2023-03-13 | End: 2023-03-13

## 2023-03-13 RX ORDER — ROCURONIUM BROMIDE 10 MG/ML
INJECTION, SOLUTION INTRAVENOUS AS NEEDED
Status: DISCONTINUED | OUTPATIENT
Start: 2023-03-13 | End: 2023-03-13

## 2023-03-13 RX ORDER — SODIUM CHLORIDE, SODIUM GLUCONATE, SODIUM ACETATE, POTASSIUM CHLORIDE, MAGNESIUM CHLORIDE, SODIUM PHOSPHATE, DIBASIC, AND POTASSIUM PHOSPHATE .53; .5; .37; .037; .03; .012; .00082 G/100ML; G/100ML; G/100ML; G/100ML; G/100ML; G/100ML; G/100ML
500 INJECTION, SOLUTION INTRAVENOUS ONCE
Status: DISCONTINUED | OUTPATIENT
Start: 2023-03-13 | End: 2023-03-14 | Stop reason: HOSPADM

## 2023-03-13 RX ORDER — LIDOCAINE HYDROCHLORIDE 10 MG/ML
INJECTION, SOLUTION EPIDURAL; INFILTRATION; INTRACAUDAL; PERINEURAL AS NEEDED
Status: DISCONTINUED | OUTPATIENT
Start: 2023-03-13 | End: 2023-03-13

## 2023-03-13 RX ORDER — HEPARIN SODIUM 1000 [USP'U]/ML
INJECTION, SOLUTION INTRAVENOUS; SUBCUTANEOUS AS NEEDED
Status: COMPLETED | OUTPATIENT
Start: 2023-03-13 | End: 2023-03-13

## 2023-03-13 RX ORDER — ACETAMINOPHEN 325 MG/1
975 TABLET ORAL EVERY 6 HOURS SCHEDULED
Status: DISCONTINUED | OUTPATIENT
Start: 2023-03-13 | End: 2023-03-14 | Stop reason: HOSPADM

## 2023-03-13 RX ORDER — SODIUM CHLORIDE 9 MG/ML
75 INJECTION, SOLUTION INTRAVENOUS CONTINUOUS
Status: DISCONTINUED | OUTPATIENT
Start: 2023-03-13 | End: 2023-03-13

## 2023-03-13 RX ORDER — ASPIRIN 325 MG
325 TABLET, DELAYED RELEASE (ENTERIC COATED) ORAL DAILY
Status: DISCONTINUED | OUTPATIENT
Start: 2023-03-14 | End: 2023-03-14 | Stop reason: HOSPADM

## 2023-03-13 RX ORDER — PROPOFOL 10 MG/ML
INJECTION, EMULSION INTRAVENOUS AS NEEDED
Status: DISCONTINUED | OUTPATIENT
Start: 2023-03-13 | End: 2023-03-13

## 2023-03-13 RX ORDER — DIPHENHYDRAMINE HYDROCHLORIDE 50 MG/ML
12.5 INJECTION INTRAMUSCULAR; INTRAVENOUS ONCE AS NEEDED
Status: DISCONTINUED | OUTPATIENT
Start: 2023-03-13 | End: 2023-03-14 | Stop reason: HOSPADM

## 2023-03-13 RX ORDER — FENTANYL CITRATE 50 UG/ML
INJECTION, SOLUTION INTRAMUSCULAR; INTRAVENOUS AS NEEDED
Status: DISCONTINUED | OUTPATIENT
Start: 2023-03-13 | End: 2023-03-13

## 2023-03-13 RX ORDER — ALBUTEROL SULFATE 2.5 MG/3ML
2.5 SOLUTION RESPIRATORY (INHALATION) ONCE AS NEEDED
Status: DISCONTINUED | OUTPATIENT
Start: 2023-03-13 | End: 2023-03-13

## 2023-03-13 RX ORDER — ONDANSETRON 2 MG/ML
4 INJECTION INTRAMUSCULAR; INTRAVENOUS ONCE AS NEEDED
Status: DISCONTINUED | OUTPATIENT
Start: 2023-03-13 | End: 2023-03-14 | Stop reason: HOSPADM

## 2023-03-13 RX ORDER — CLOPIDOGREL BISULFATE 75 MG/1
75 TABLET ORAL DAILY
Status: DISCONTINUED | OUTPATIENT
Start: 2023-03-14 | End: 2023-03-14 | Stop reason: HOSPADM

## 2023-03-13 RX ORDER — HYDROMORPHONE HCL IN WATER/PF 6 MG/30 ML
0.2 PATIENT CONTROLLED ANALGESIA SYRINGE INTRAVENOUS
Status: DISCONTINUED | OUTPATIENT
Start: 2023-03-13 | End: 2023-03-14 | Stop reason: HOSPADM

## 2023-03-13 RX ORDER — FENTANYL CITRATE/PF 50 MCG/ML
25 SYRINGE (ML) INJECTION
Status: DISCONTINUED | OUTPATIENT
Start: 2023-03-13 | End: 2023-03-13

## 2023-03-13 RX ORDER — OXYCODONE HYDROCHLORIDE 5 MG/1
5 TABLET ORAL EVERY 4 HOURS PRN
Status: DISCONTINUED | OUTPATIENT
Start: 2023-03-13 | End: 2023-03-14 | Stop reason: HOSPADM

## 2023-03-13 RX ORDER — LIDOCAINE HYDROCHLORIDE 10 MG/ML
INJECTION, SOLUTION EPIDURAL; INFILTRATION; INTRACAUDAL; PERINEURAL AS NEEDED
Status: COMPLETED | OUTPATIENT
Start: 2023-03-13 | End: 2023-03-13

## 2023-03-13 RX ORDER — VERAPAMIL HYDROCHLORIDE 2.5 MG/ML
INJECTION, SOLUTION INTRAVENOUS AS NEEDED
Status: COMPLETED | OUTPATIENT
Start: 2023-03-13 | End: 2023-03-13

## 2023-03-13 RX ORDER — SODIUM CHLORIDE 9 MG/ML
INJECTION, SOLUTION INTRAVENOUS CONTINUOUS PRN
Status: DISCONTINUED | OUTPATIENT
Start: 2023-03-13 | End: 2023-03-13

## 2023-03-13 RX ORDER — MIDAZOLAM HYDROCHLORIDE 2 MG/2ML
INJECTION, SOLUTION INTRAMUSCULAR; INTRAVENOUS AS NEEDED
Status: DISCONTINUED | OUTPATIENT
Start: 2023-03-13 | End: 2023-03-13

## 2023-03-13 RX ORDER — IODIXANOL 320 MG/ML
300 INJECTION, SOLUTION INTRAVASCULAR
Status: COMPLETED | OUTPATIENT
Start: 2023-03-13 | End: 2023-03-13

## 2023-03-13 RX ORDER — ENOXAPARIN SODIUM 100 MG/ML
40 INJECTION SUBCUTANEOUS DAILY
Status: DISCONTINUED | OUTPATIENT
Start: 2023-03-14 | End: 2023-03-14 | Stop reason: HOSPADM

## 2023-03-13 RX ADMIN — FENTANYL CITRATE 100 MCG: 50 INJECTION INTRAMUSCULAR; INTRAVENOUS at 08:18

## 2023-03-13 RX ADMIN — SODIUM CHLORIDE: 0.9 INJECTION, SOLUTION INTRAVENOUS at 08:30

## 2023-03-13 RX ADMIN — SUGAMMADEX 200 MG: 100 INJECTION, SOLUTION INTRAVENOUS at 10:22

## 2023-03-13 RX ADMIN — NITROGLYCERIN 400 MCG: 20 INJECTION INTRAVENOUS at 08:58

## 2023-03-13 RX ADMIN — HEPARIN SODIUM 1000 UNITS: 1000 INJECTION INTRAVENOUS; SUBCUTANEOUS at 09:27

## 2023-03-13 RX ADMIN — MIDAZOLAM 2 MG: 1 INJECTION INTRAMUSCULAR; INTRAVENOUS at 08:14

## 2023-03-13 RX ADMIN — VERAPAMIL HYDROCHLORIDE 10 MG: 2.5 INJECTION INTRAVENOUS at 09:14

## 2023-03-13 RX ADMIN — NITROGLYCERIN 1 INCH: 20 OINTMENT TOPICAL at 08:00

## 2023-03-13 RX ADMIN — HEPARIN SODIUM 5000 UNITS: 1000 INJECTION INTRAVENOUS; SUBCUTANEOUS at 08:58

## 2023-03-13 RX ADMIN — OXYCODONE HYDROCHLORIDE 5 MG: 5 TABLET ORAL at 20:56

## 2023-03-13 RX ADMIN — ROCURONIUM 50 MG: 50 INJECTION, SOLUTION INTRAVENOUS at 08:21

## 2023-03-13 RX ADMIN — IODIXANOL 150 ML: 320 INJECTION, SOLUTION INTRAVASCULAR at 10:40

## 2023-03-13 RX ADMIN — NITROGLYCERIN 600 MCG: 20 INJECTION INTRAVENOUS at 08:56

## 2023-03-13 RX ADMIN — PHENYLEPHRINE HYDROCHLORIDE 30 MCG/MIN: 10 INJECTION INTRAVENOUS at 08:27

## 2023-03-13 RX ADMIN — LIDOCAINE HYDROCHLORIDE 3 ML: 10 INJECTION, SOLUTION EPIDURAL; INFILTRATION; INTRACAUDAL; PERINEURAL at 08:56

## 2023-03-13 RX ADMIN — LIDOCAINE HYDROCHLORIDE 1 ML: 10 INJECTION, SOLUTION EPIDURAL; INFILTRATION; INTRACAUDAL; PERINEURAL at 08:58

## 2023-03-13 RX ADMIN — LIDOCAINE HYDROCHLORIDE 30 MG: 10 INJECTION, SOLUTION EPIDURAL; INFILTRATION; INTRACAUDAL at 08:18

## 2023-03-13 RX ADMIN — DEXAMETHASONE SODIUM PHOSPHATE 10 MG: 10 INJECTION, SOLUTION INTRAMUSCULAR; INTRAVENOUS at 08:21

## 2023-03-13 RX ADMIN — ACETAMINOPHEN 975 MG: 325 TABLET ORAL at 20:56

## 2023-03-13 RX ADMIN — VERAPAMIL HYDROCHLORIDE 5 MG: 2.5 INJECTION INTRAVENOUS at 08:58

## 2023-03-13 RX ADMIN — ROCURONIUM 20 MG: 50 INJECTION, SOLUTION INTRAVENOUS at 09:05

## 2023-03-13 RX ADMIN — ONDANSETRON 4 MG: 2 INJECTION INTRAMUSCULAR; INTRAVENOUS at 10:22

## 2023-03-13 RX ADMIN — PROPOFOL 200 MG: 10 INJECTION, EMULSION INTRAVENOUS at 08:21

## 2023-03-13 RX ADMIN — SODIUM CHLORIDE 75 ML/HR: 0.9 INJECTION, SOLUTION INTRAVENOUS at 07:28

## 2023-03-13 NOTE — PROGRESS NOTES
I have personally seen and examined patient and reviewed all data with resident  Agree with note, assessment and plan  Critical care time consult  Please refer to attending comments below  Critical care time does not include procedures, family meeting or teaching  Right anterior communicating artery aneurysm status postelective endovascular intervention with angiogram and web embolization access site right radial on 3/13/2023  Hypertension  Carotid artery stenosis  Myocardial infarction  Hyperlipidemia-continue Lipitor  Hypothyroid  History of breast cancer    Exam:  Awake alert and oriented x3, engages in conversation without dysarthria or aphasia, moves all extremities with 5 out of 5 strength x4 extremities, sensory grossly intact, radial access site without hematoma    Patient is a 35-year-old female who presented for elective endovascular intervention for cerebral aneurysm  Neurologic checks every hour  Plan to reimage if patient has new focality to exam or decrease in GCS by greater than 2 points  Continue Plavix and aspirin  Goal systolic blood pressure [id number] with mean arterial blood pressure greater than 65  As needed labetalol and hydralazine tonight  Restart p o  home medications starting tomorrow  Vascular checks to right radial access site every hour  Maintain IV fluids until patient is tolerating p o  diet

## 2023-03-13 NOTE — PLAN OF CARE
Problem: MOBILITY - ADULT  Goal: Maintain or return to baseline ADL function  Description: INTERVENTIONS:  -  Assess patient's ability to carry out ADLs; assess patient's baseline for ADL function and identify physical deficits which impact ability to perform ADLs (bathing, care of mouth/teeth, toileting, grooming, dressing, etc )  - Assess/evaluate cause of self-care deficits   - Assess range of motion  - Assess patient's mobility; develop plan if impaired  - Assess patient's need for assistive devices and provide as appropriate  - Encourage maximum independence but intervene and supervise when necessary  - Involve family in performance of ADLs  - Assess for home care needs following discharge   - Consider OT consult to assist with ADL evaluation and planning for discharge  - Provide patient education as appropriate  Outcome: Progressing  Goal: Maintains/Returns to pre admission functional level  Description: INTERVENTIONS:  - Perform BMAT or MOVE assessment daily    - Set and communicate daily mobility goal to care team and patient/family/caregiver  - Collaborate with rehabilitation services on mobility goals if consulted  - Perform Range of Motion  times a day  - Reposition patient every  hours    - Dangle patient  times a day  - Stand patient  times a day  - Ambulate patient  times a day  - Out of bed to chair  times a day   - Out of bed for meals  times a day  - Out of bed for toileting  - Record patient progress and toleration of activity level   Outcome: Progressing     Problem: PAIN - ADULT  Goal: Verbalizes/displays adequate comfort level or baseline comfort level  Description: Interventions:  - Encourage patient to monitor pain and request assistance  - Assess pain using appropriate pain scale  - Administer analgesics based on type and severity of pain and evaluate response  - Implement non-pharmacological measures as appropriate and evaluate response  - Consider cultural and social influences on pain and pain management  - Notify physician/advanced practitioner if interventions unsuccessful or patient reports new pain  Outcome: Progressing     Problem: INFECTION - ADULT  Goal: Absence or prevention of progression during hospitalization  Description: INTERVENTIONS:  - Assess and monitor for signs and symptoms of infection  - Monitor lab/diagnostic results  - Monitor all insertion sites, i e  indwelling lines, tubes, and drains  - Monitor endotracheal if appropriate and nasal secretions for changes in amount and color  - [place] appropriate cooling/warming therapies per order  - Administer medications as ordered  - Instruct and encourage patient and family to use good hand hygiene technique  - Identify and instruct in appropriate isolation precautions for identified infection/condition  Outcome: Progressing  Goal: Absence of fever/infection during neutropenic period  Description: INTERVENTIONS:  - Monitor WBC    Outcome: Progressing     Problem: SAFETY ADULT  Goal: Maintain or return to baseline ADL function  Description: INTERVENTIONS:  -  Assess patient's ability to carry out ADLs; assess patient's baseline for ADL function and identify physical deficits which impact ability to perform ADLs (bathing, care of mouth/teeth, toileting, grooming, dressing, etc )  - Assess/evaluate cause of self-care deficits   - Assess range of motion  - Assess patient's mobility; develop plan if impaired  - Assess patient's need for assistive devices and provide as appropriate  - Encourage maximum independence but intervene and supervise when necessary  - Involve family in performance of ADLs  - Assess for home care needs following discharge   - Consider OT consult to assist with ADL evaluation and planning for discharge  - Provide patient education as appropriate  Outcome: Progressing  Goal: Maintains/Returns to pre admission functional level  Description: INTERVENTIONS:  - Perform BMAT or MOVE assessment daily    - Set and communicate daily mobility goal to care team and patient/family/caregiver  - Collaborate with rehabilitation services on mobility goals if consulted  - Perform Range of Motion  times a day  - Reposition patient every  hours    - Dangle patient  times a day  - Stand patient  times a day  - Ambulate patient  times a day  - Out of bed to chair  times a day   - Out of bed for meals  times a day  - Out of bed for toileting  - Record patient progress and toleration of activity level   Outcome: Progressing  Goal: Patient will remain free of falls  Description: INTERVENTIONS:  - Educate patient/family on patient safety including physical limitations  - Instruct patient to call for assistance with activity   - Consult OT/PT to assist with strengthening/mobility   - Keep Call bell within reach  - Keep bed low and locked with side rails adjusted as appropriate  - Keep care items and personal belongings within reach  - Initiate and maintain comfort rounds  - Make Fall Risk Sign visible to staff  - Offer Toileting every  Hours, in advance of need  - Initiate/Maintain alarm  - Obtain necessary fall risk management equipment:   - Apply yellow socks and bracelet for high fall risk patients  - Consider moving patient to room near nurses station  Outcome: Progressing     Problem: DISCHARGE PLANNING  Goal: Discharge to home or other facility with appropriate resources  Description: INTERVENTIONS:  - Identify barriers to discharge w/patient and caregiver  - Arrange for needed discharge resources and transportation as appropriate  - Identify discharge learning needs (meds, wound care, etc )  - Arrange for interpretive services to assist at discharge as needed  - Refer to Case Management Department for coordinating discharge planning if the patient needs post-hospital services based on physician/advanced practitioner order or complex needs related to functional status, cognitive ability, or social support system  Outcome: Progressing Problem: Knowledge Deficit  Goal: Patient/family/caregiver demonstrates understanding of disease process, treatment plan, medications, and discharge instructions  Description: Complete learning assessment and assess knowledge base  Interventions:  - Provide teaching at level of understanding  - Provide teaching via preferred learning methods  Outcome: Progressing     Problem: Nutrition/Hydration-ADULT  Goal: Nutrient/Hydration intake appropriate for improving, restoring or maintaining nutritional needs  Description: Monitor and assess patient's nutrition/hydration status for malnutrition  Collaborate with interdisciplinary team and initiate plan and interventions as ordered  Monitor patient's weight and dietary intake as ordered or per policy  Utilize nutrition screening tool and intervene as necessary  Determine patient's food preferences and provide high-protein, high-caloric foods as appropriate       INTERVENTIONS:  - Monitor oral intake, urinary output, labs, and treatment plans  - Assess nutrition and hydration status and recommend course of action  - Evaluate amount of meals eaten  - Assist patient with eating if necessary   - Allow adequate time for meals  - Recommend/ encourage appropriate diets, oral nutritional supplements, and vitamin/mineral supplements  - Order, calculate, and assess calorie counts as needed  - Recommend, monitor, and adjust tube feedings and TPN/PPN based on assessed needs  - Assess need for intravenous fluids  - Provide specific nutrition/hydration education as appropriate  - Include patient/family/caregiver in decisions related to nutrition  Outcome: Progressing     Problem: Potential for Falls  Goal: Patient will remain free of falls  Description: INTERVENTIONS:  - Educate patient/family on patient safety including physical limitations  - Instruct patient to call for assistance with activity   - Consult OT/PT to assist with strengthening/mobility   - Keep Call bell within reach  - Keep bed low and locked with side rails adjusted as appropriate  - Keep care items and personal belongings within reach  - Initiate and maintain comfort rounds  - Make Fall Risk Sign visible to staff  - Offer Toileting every  Hours, in advance of need  - Initiate/Maintain alarm  - Obtain necessary fall risk management equipment:   - Apply yellow socks and bracelet for high fall risk patients  - Consider moving patient to room near nurses station  Outcome: Progressing     Problem: Prexisting or High Potential for Compromised Skin Integrity  Goal: Skin integrity is maintained or improved  Description: INTERVENTIONS:  - Identify patients at risk for skin breakdown  - Assess and monitor skin integrity  - Assess and monitor nutrition and hydration status  - Monitor labs   - Assess for incontinence   - Turn and reposition patient  - Assist with mobility/ambulation  - Relieve pressure over bony prominences  - Avoid friction and shearing  - Provide appropriate hygiene as needed including keeping skin clean and dry  - Evaluate need for skin moisturizer/barrier cream  - Collaborate with interdisciplinary team   - Patient/family teaching  - Consider wound care consult   Outcome: Progressing

## 2023-03-13 NOTE — CONSULTS
25 [facility] 62 y o  female MRN: [id number]  Unit/Bed#: ICU 14 Encounter: [id number]    Chief Complaint: Incidental finding of 5 5 millimeter anterior communicating artery aneurysm    History of Present Illness    62years old female patient with past medical history of hypertension, carotid artery stenosis who is following up for her carotid artery stenosis for extended period of time  She was noted to have interval worsening stenosis and evaluated for carotid endarterectomy  As a part of evaluation CT of her neck was done and showed 5 5 mm anterior communicating artery aneurysm  Patient had IR cerebral angiogram on December 2, 3120 which complicated by bleeding and bruising to her bilateral forearms with difficult IV access and she was found to have small radial artery which took her several weeks to fully recover from that  Patient came in today for planned embolization of anterior communicating artery aneurysm  Patient is now status post successful web embolization of anterior communicating artery aneurysm  Patient was taking DAPT at home for 7 days before the procedure  Patient has family history of aneurysm as mother had brain aneurysm  Patient was seen lying the bed comfortably and pleasant  Patient denies any symptoms except for numbness that happened once on the left thumb the index finger  History obtained from chart review and the patient  Assessment and Plan:    Neuro:   • Diagnosis: Right anterior communicating aneurysm status post successful web embolization of anterior communicating artery aneurysm  • Patient has past medical history of hypertension and carotid artery stenosis  • Patient had carotid endarterectomy on October 2022 for treatment of carotid artery stenosis  • Patient had IR cerebral angiogram on December 2022 showing 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  • Patient neurological exam is intact postprocedure    • Plan: • Continue neuro check every 2 hours  • Continue aspirin 325 mg tablets daily  • Continue Plavix 75 mg tablet daily  • Continue hydrochlorothiazide 25 mg tablet daily  • Follow-up CTA head with and without contrast for WEB Fu  • Continue vascular check every 1 hour      CV:   • Diagnosis: Myocardial infarction 2016  and hypertension  o Plan:   o Continue to monitor patient blood pressure  o Blood pressure goal is 110-160  o Holding blood pressure medication because of low blood pressure   o If the pressure continue to drop will plan to give bolus of 500 cc isolyte  Pulm:  • Diagnosis: No acute issue  • Plan: incentive spirometry      GI:   • Diagnosis: No acute issues  o Plan: Regular diet as tolerated    :   • Diagnosis: No acute issues  • Patient is status post Rosas catheter removal  o Plan: Retention protocol      F/E/N:   • plan: Continue sodium chloride 0 9 infusion 75 mL/h      Heme/Onc:              Diagnosis: No acute distress    Endo:               Diagnosis: Hypothyroidism              Plan: Outpatient follow-up    ID:                Diagnosis: No acute issues    MSK/Skin:   • Diagnosis: No acute distress  o Plan: PT/OT eval    Disposition: Admit to Critical Care   Code Status: Level 1 - Full Code   --------------------------------------------------------------------------------------------------------------  Review of Systems   Constitutional: Negative for activity change, appetite change, chills, diaphoresis, fatigue, fever and unexpected weight change  HENT: Positive for sore throat  Negative for congestion, ear discharge, ear pain, mouth sores, nosebleeds, postnasal drip, rhinorrhea, sinus pressure, sinus pain, tinnitus, trouble swallowing and voice change  Eyes: Negative for photophobia, pain, discharge, redness, itching and visual disturbance  Respiratory: Negative for apnea, cough, choking, chest tightness, shortness of breath, wheezing and stridor      Cardiovascular: Negative for chest pain, palpitations and leg swelling  Gastrointestinal: Negative for abdominal distention, abdominal pain, anal bleeding, blood in stool, constipation, diarrhea, nausea, rectal pain and vomiting  Endocrine: Negative for cold intolerance, heat intolerance, polydipsia, polyphagia and polyuria  Genitourinary: Negative for decreased urine volume, difficulty urinating, dysuria, enuresis, flank pain, frequency, genital sores, hematuria, menstrual problem, vaginal bleeding, vaginal discharge and vaginal pain  Musculoskeletal: Negative for arthralgias, back pain, gait problem, joint swelling, myalgias, neck pain and neck stiffness  Skin: Negative for color change, pallor, rash and wound  Neurological: Positive for numbness (it happened once of the left thumb)  Negative for dizziness, syncope, facial asymmetry, speech difficulty, weakness, light-headedness and headaches  Psychiatric/Behavioral: Negative for agitation, confusion, hallucinations, self-injury and suicidal ideas  A 12-point, complete review of systems was reviewed and negative except as stated above     Physical Exam  Constitutional:       General: She is not in acute distress  Appearance: Normal appearance  She is not ill-appearing, toxic-appearing or diaphoretic  HENT:      Head: Normocephalic and atraumatic  Nose: Nose normal  No congestion or rhinorrhea  Mouth/Throat:      Mouth: Mucous membranes are moist       Pharynx: No oropharyngeal exudate or posterior oropharyngeal erythema  Eyes:      General: No scleral icterus  Right eye: No discharge  Left eye: No discharge  Extraocular Movements: Extraocular movements intact  Conjunctiva/sclera: Conjunctivae normal       Pupils: Pupils are equal, round, and reactive to light  Neck:      Vascular: No carotid bruit  Cardiovascular:      Rate and Rhythm: Normal rate and regular rhythm  Pulses: Normal pulses        Heart sounds: Normal heart sounds  No murmur heard  No friction rub  No gallop  Pulmonary:      Effort: Pulmonary effort is normal  No respiratory distress  Breath sounds: Normal breath sounds  No stridor  No wheezing, rhonchi or rales  Chest:      Chest wall: No tenderness  Abdominal:      General: Abdomen is flat  Bowel sounds are normal  There is no distension  Palpations: Abdomen is soft  There is no mass  Tenderness: There is no abdominal tenderness  There is no right CVA tenderness, left CVA tenderness, guarding or rebound  Hernia: No hernia is present  Musculoskeletal:         General: No swelling, tenderness, deformity or signs of injury  Normal range of motion  Cervical back: Normal range of motion and neck supple  No rigidity or tenderness  Right lower leg: No edema  Left lower leg: No edema  Lymphadenopathy:      Cervical: No cervical adenopathy  Skin:     General: Skin is warm and dry  Coloration: Skin is not jaundiced or pale  Findings: No bruising, erythema, lesion or rash  Neurological:      General: No focal deficit present  Mental Status: She is alert and oriented to person, place, and time  Cranial Nerves: No cranial nerve deficit  Sensory: No sensory deficit  Motor: No weakness  Psychiatric:         Mood and Affect: Mood normal          Behavior: Behavior normal          Judgment: Judgment normal          --------------------------------------------------------------------------------------------------------------  Vitals:   Vitals:    03/13/23 1315 03/13/23 1330 03/13/23 1400 03/13/23 1430   BP:       BP Location:       Pulse: 90 98 104    Resp: 12 22 (!) 26    Temp:       TempSrc:       SpO2:   96% 96%   Weight:       Height:         Temp  Min: 97 5 °F (36 4 °C)  Max: 97 7 °F (36 5 °C)  IBW (Ideal Body Weight): 52 4 kg  Height: 5' 3" (160 cm)  Body mass index is 30 47 kg/m²    N/A    Laboratory and Diagnostics:  Results from last 7 days Lab Units 03/13/23  1134   PLATELETS Thousands/uL 228              Results from last 7 days   Lab Units 03/10/23  1020   INR  0 95   PTT seconds 27              Historical Information   Past Medical History:   Diagnosis Date   • Aneurysm ([facility] [address] )     brain   • Arthritis    • Breast cancer ([facility] [address] ) 2001   • Disease of thyroid gland    • Dizziness    • Hyperlipidemia    • Hypertension    • Hypothyroid    • Myocardial infarction [facility])    • Vitiligo      Past Surgical History:   Procedure Laterality Date   • BREAST CYST EXCISION Left 2001    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   • BREAST LUMPECTOMY Left 04/2001    DCIS   • BREAST SURGERY     • COLONOSCOPY     • HIP SURGERY Right     1966, 1975, 1976    • IR CEREBRAL ANGIOGRAPHY  12/2/2022   • TN TEAEC W/PATCH GRF CAROTID VERTB SUBCLAV NECK INC Right 10/13/2022    Procedure: ENDARTERECTOMY ARTERY CAROTID (Eversion) WITH BOVINE PATCH ANGIOPLASTY;  Surgeon: Sharyle Roux, DO;  Location: [facility];  Service: Vascular     Social History   Social History     Substance and Sexual Activity   Alcohol Use Yes    Comment: Occasional      Social History     Substance and Sexual Activity   Drug Use Never     Social History     Tobacco Use   Smoking Status Former   Smokeless Tobacco Never   Tobacco Comments    Smoked 20yrs up to 1ppd    Quit in 2008     Family History:   Family History   Problem Relation Age of Onset   • Other Mother         Carotid stenosis, bilateral    • Heart disease Mother    • Hypertension Mother    • Lung cancer Father         smoker   • Liver cancer Father    • Hypertension Father    • No Known Problems Sister    • No Known Problems Sister    • Heart attack Maternal Grandmother    • Thyroid disease Maternal Grandmother    • No Known Problems Maternal Grandfather    • No Known Problems Paternal Grandmother    • No Known Problems Paternal Grandfather    • Hypertension Brother    • No Known Problems Brother    • No Known Problems Maternal Aunt    • No Known Problems Maternal Aunt    • Cancer Paternal Aunt      I have reviewed this patient's family history and commented on sigificant items within the HPI      Medications:  Current Facility-Administered Medications   Medication Dose Route Frequency   • [START ON 3/14/2023] aspirin (ECOTRIN) EC tablet 325 mg  325 mg Oral Daily   • [START ON 3/14/2023] clopidogrel (PLAVIX) tablet 75 mg  75 mg Oral Daily   • diphenhydrAMINE (BENADRYL) injection 12 5 mg  12 5 mg Intravenous Once PRN   • [START ON 3/14/2023] enoxaparin (LOVENOX) subcutaneous injection 40 mg  40 mg Subcutaneous Daily   • fentaNYL (SUBLIMAZE) injection 25 mcg  25 mcg Intravenous Q3 min PRN   • [START ON 3/14/2023] hydrochlorothiazide (HYDRODIURIL) tablet 25 mg  25 mg Oral Daily   • HYDROmorphone HCl (DILAUDID) injection 0 2 mg  0 2 mg Intravenous Q5 Min PRN   • ondansetron (ZOFRAN) injection 4 mg  4 mg Intravenous Once PRN   • sodium chloride 0 9 % infusion  75 mL/hr Intravenous Continuous   • sodium chloride 0 9 % infusion  75 mL/hr Intravenous Continuous     Home medications:  Prior to Admission Medications   Prescriptions Last Dose Informant Patient Reported? Taking?    ARMOUR THYROID 90 MG tablet 3/12/2023  No Yes   Sig: TAKE 1 TABLET DAILY   Patient taking differently: Take 90 mg by mouth every other day Takes 1/2 of pill   [place] Thyroid 120 MG tablet 3/13/2023  Yes Yes   Sig: Take 120 mg by mouth every other day   Ascorbic Acid (VITAMIN C) 1000 MG tablet 3/12/2023  Yes Yes   Sig: Take 500 mg by mouth daily   B Complex Vitamins (VITAMIN B COMPLEX PO) 3/12/2023  Yes Yes   Sig: B Complex   Multiple Vitamin (MULTIVITAMIN ADULT PO) 3/12/2023  Yes Yes   Sig: Take by mouth   amoxicillin (AMOXIL) 500 MG tablet Past Month  Yes Yes   Sig: Take 500 mg by mouth 2 (two) times a day   aspirin (ECOTRIN) 325 mg EC tablet 3/13/2023  No Yes   Sig: Take 1 tablet (325 mg total) by mouth daily Begin one week prior to procedure   aspirin 81 MG tablet Past Week  Yes Yes   Sig: Take 1 tablet by mouth daily   calcium carbonate-vitamin D (OSCAL-D) 500 mg-200 units per tablet 3/12/2023  Yes Yes   Sig: Take 1 tablet by mouth 2 (two) times a day with meals   clopidogrel (PLAVIX) 75 mg tablet 3/13/2023  No Yes   Sig: Take 1 tablet (75 mg total) by mouth daily Begin one week prior to procedure   hydrochlorothiazide (HYDRODIURIL) 25 mg tablet 3/13/2023  Yes Yes   Sig: Take 25 mg by mouth daily   irbesartan (AVAPRO) 150 mg tablet 3/13/2023  Yes Yes   Sig: Take 150 mg by mouth every other day Alternating with 300mg dose   irbesartan (AVAPRO) 300 mg tablet 3/12/2023  Yes Yes   Sig: Take 300 mg by mouth every other day   rosuvastatin (CRESTOR) 5 mg tablet Past Week  Yes Yes      Facility-Administered Medications: None     Allergies: Allergies   Allergen Reactions   • Latex Rash   • Other Rash     Adhesives     ------------------------------------------------------------------------------------------------------------  Advance Directive and Living Will:      Power of :    POLST:    ------------------------------------------------------------------------------------------------------------  Anticipated Length of Stay is > 2 midnights    Care Time Delivered:   45 minutes      Rowan Brito,         Portions of the record may have been created with voice recognition software  Occasional wrong word or "sound a like" substitutions may have occurred due to the inherent limitations of voice recognition software    Read the chart carefully and recognize, using context, where substitutions have occurred

## 2023-03-13 NOTE — ANESTHESIA PROCEDURE NOTES
Arterial Line Insertion  Performed by: Shira Lawson CRNA  Authorized by: Lavern Gray DO   Consent: Verbal consent obtained  Written consent obtained  Risks and benefits: risks, benefits and alternatives were discussed  Consent given by: patient  Patient understanding: patient states understanding of the procedure being performed  Patient consent: the patient's understanding of the procedure matches consent given  Procedure consent: procedure consent matches procedure scheduled  Relevant documents: relevant documents present and verified  Required items: required blood products, implants, devices, and special equipment available  Patient identity confirmed: arm band and hospital-assigned identification number  Time out: Immediately prior to procedure a "time out" was called to verify the correct patient, procedure, equipment, support staff and site/side marked as required  Preparation: Patient was prepped and draped in the usual sterile fashion  Indications: hemodynamic monitoring  Orientation:  Left  Location: radial artery  Sedation:  Patient sedated: yes (GETA)  Sedatives: see MAR for details  Analgesia: see MAR for details  Vitals: Vital signs were monitored during sedation      Procedure Details:  Freddy's test normal: yes  Needle gauge: 20  Seldinger technique: Seldinger technique used  Number of attempts: 1    Post-procedure:  Post-procedure: dressing applied  Waveform: good waveform and waveform confirmed  Post-procedure CNS: normal and unchanged  Patient tolerance: patient tolerated the procedure well with no immediate complications

## 2023-03-13 NOTE — SEDATION DOCUMENTATION
Cerebral angiogram completed by Dr Casi Pappas  Tolerated well  TR band with 17ml air at 10:18 - patent hemostasis   To transport to ICU and give bedside report

## 2023-03-13 NOTE — ANESTHESIA POSTPROCEDURE EVALUATION
Post-Op Assessment Note    CV Status:  Stable  Pain Score: 0    Pain management: adequate     Mental Status:  Awake   Hydration Status:  Stable   PONV Controlled:  None   Airway Patency:  Patent      Post Op Vitals Reviewed: Yes      Staff: CRNA   Comments: Report given at bedside in IR to [facility], No questions  Aware of BP goal < 160 mmhg and MAP > 65  No notable events documented      BP   122/60   Temp 97 2F   Pulse  93   Resp   16   SpO2   97% RA

## 2023-03-13 NOTE — DISCHARGE INSTRUCTIONS
On 3/13/2023, you underwent a cerebral angiogram under the care of Dr Casi Pappas for treatment of anterior communicating artery (ACoA) aneurysm  ? The following instructions will help you care for yourself, or be cared for upon your return home today  These are guidelines for your care right after your surgery only  ? Notify Your Doctor or Nurse if you have any of the following:  ? SYMPTOMS OF WOUND INFECTION--   Increased pain in or around the incision   Swelling around the incision  Any drainage from the incision  Incision separates or opens up  Warmth in the tissues around the incision  Redness or tenderness on the skin near the incision   Fever (temperature greater than 101 degrees F)   ? NEUROLOGICAL CHANGES--  Change in alertness  Increased sleepiness   Nausea and vomiting   New onset of numbness or weakness in arms or legs   New problems with your bowels or bladder  New or worse problems with balance or walking  Seizures, new or worsening  ? UNRELIEVED HEADACHE PAIN--  New or increased pain unrelieved with pain medications   Pain associated with nausea and vomiting   Pain associated with other symptoms  ? QUESTIONS OR PROBLEMS--  Any questions or problems that you are unsure about  Wound Care:  Keep Incision Clean and Dry   You may shower daily, but do not soak incision  Pat dry after showering  No tub baths, soaking, swimming for 1 week after angiogram    You do not need to cover the incision  Mild to moderate bruising and tenderness to the site is expected and may last up to 1-2 weeks after your procedure  ?  A closure device was placed at the catheter insertion site  This is MRI compatible  Remove the dressing 24 hours after your procedure  If your groin site is bleeding, apply firm pressure for 10 minutes  Reinforce dressing rather than removing and checking frequently  If continues to bleed through the dressing after 1 hour, contact your neurosurgeon's office     Anticipatory Education:  ? PAIN MED W/ Acetaminophen (Tylenol)  --IF a prescription for pain medicine has been sent home with you:  --Narcotic pain medication may cause constipation  Be sure to take stool softeners or laxatives while you are on narcotic pain medication  --Do not drive after taking prescription pain medicine  ?  If this medicine is too strong, or no longer necessary, or we did NOT recommend/prescribe oral narcotics, you may take:   - Tylenol Extra-strength/Acetaminophen, 2 tablets every 4-6 hours as needed for mild pain  DO NOT TAKE MORE THAN 4000MG PER DAY from combined sources  NOTE: Remember to eat when taking pain medicines in order to avoid nausea  Watch for constipation  Eat plenty of fruits, vegetables, juices, and drink 6-8 glasses of water each day  Constipation: Stay active and drink at least 6-8 cups of fluid each day to prevent constipation  If you need a laxative or stool softener follow the package directions or consult with your local pharmacists if you have questions  ? After anesthesia, rest for 24 hours  Do not drive, drink alcohol beverages or make any important decisions during this time  General anesthesia may cause sore throat, jaw discomfort or muscle aches  These symptoms can last for one or two days  Activity: Please follow these instructions:  Advance your activity as you can tolerate  You may do light house work; nothing strenuous   You may walk all you want  You may go up and down the steps  Use the railing for support  Do not do excessive bending, straining or heavy lifting for 48 hours after your procedure  Do not drive or return to work until you are instructed   It is normal for your energy level and sleep patterns to change after surgery  Get extra sleep at night and take naps during the day to help you feel less tired  Take rest periods during the day  Complete recovery may take several weeks  ?  You may resume driving after 46-32 hours recovery     You may return to work after 48 hours of recovery  ?  Diet:  Your doctor has recommended that you follow these diet instructions at home  Refer to the patient education materials you received during your hospital stay  If you would like more nutrition counseling, ask your doctor about making an appointment with an outpatient dietitian  Resume your home diet  ? Medications:  Please resume your home medications as instructed  ? Home Supplies and Equipment:  none  Additional Contacts:  ? CONTACTS FOR NEUROSURGERY: You may call your neurosurgeon’s office if you have questions between 8:30 am and 4:30 pm  You may request to speak to the nurse practitioner who is available Monday through Friday  ?  For off hours or the weekend you may call your neurosurgeon's office to leave a message

## 2023-03-13 NOTE — OP NOTE
OPERATIVE REPORT  PATIENT NAME: Becki Corona     :  1965  MRN: [id number]   Pt Location: Interventional radiology    SURGERY DATE: 23     Preop Diagnosis:  1  Right AcoA aneurysm    Postop Diagnosis  1  Right AcoA aneurysm    Procedure:  Use of ultrasound  Right radial arteriogram   Right Internal Carotid Arteriogram  Slow infusion of spasmolytic agent, verapamil 10mg, over 10 minutes  3D rotational arteriogram of right internal carotid artery with post processed images reviewed at a separate workstation  Woven Endoluminal Bridge E  I  [employer]) embolization of ACoA aneurysm  Postprocedural 3D rotational arteriogram with virtual dilution of right internal carotid artery with post processed images reviewed at a separate workstation  Postprocedural arteriography of right ICA     Surgeon:   Eugenia Mattson MD     Specimen(s):  None     Estimated Blood Loss:   None     Drains:  None     Anesthesia Type:   General anesthesia     Complications:  None     Operative Indications:  Becki Corona is a very pleasant 62 y o  female who was found to have an incidental ACoA aneurysm  After discussing the risks and benefits of treatment as well as treatment options the patient elected to proceed  The patient understood that these risks included bleeding, stroke, groin hematoma, and death the patient elected to proceed  Procedure Details:  After obtaining written informed consent, the patient was brought into the operating room and moved to the OR table in supine fashion  The right radial artery was prepped and draped in the usual sterile fashion  General anesthesia was induced  A surgical time-out was performed  3 cc mixture of lidocaine and 400 mcg of nitroglycerin was injected immediately above the right radial artery  Ultrasound guidance under real-time visualization was used to guide the micro puncture needle into the right radial artery  Next, a 7 [place] tapered sheath was then placed    Next and infusion of 300 mcg of nitroglycerin, 5000 units heparin, and 5 mg of verapamil were slowly injected intra-arterially through the right radial sheath  A baseline ACT was obtained prior to heparin administration and reach checked periodically through the case in order to ensure it remained 1 5-2 times greater than baseline  Radial artery arteriogram then performed  Next a 079 RIST distal access catheter was advanced over a 5 [place]isha Alvarado glide catheter was advanced and reshaped  The catheter was then advanced and the right internal carotid artery was then catheterized  AP lateral and magnified oblique images of the right intracranial carotid circulation were obtained  The RIST  was advanced into the distal cervical right ICA  Next a Almetta Jetty was advanced into the distal cervical/petrous segment  In AP and lateral arteriogram of the right internal carotid artery and the subsequent 3D rotational arteriogram of the left internal carotid artery was performed  After reviewing the images at a post processing workstation working views were obtained and arteriogram was performed  Next a Glorine [place] was advanced over a Traxcess microwire in the aneurysm was catheterized  Next a 6x3 mm WEB was successfully deployed within the aneurysm  After completion of deployment, but prior to detachment, a 3D rotational arteriogram was performed  After reviewing these images at a separate workstation and inspecting the virtual dilution the web device was detached  Final AP lateral magnified oblique images were obtained  The catheters were then withdrawn from the body and a TR compression band was placed  There was appropriate hemostasis  The patient was then awoken from anesthesia care and found to be in baseline neurologic condition  All sponge and needle counts were correct  INTERPRETATION OF ANGIOGRAPHIC FINDINGS:   1    The right internal carotid artery circulation reveals antegrade flow into the middle cerebral and anterior cerebral arteries  There is an elongated ACoA aneurysm  The capillary and venous phases are unremarkable  2  3D rotational arteriogram of the ACoA demonstrates appropriate working views as well as improved sizing measurements for the device     3  Intraprocedural arteriography demonstrates progressive deployment of the WEB device  4  3D rotational post deployment arteriogram with virtual dilution of the right ICA demonstrates good apposition of the device within the aneurysm dome with patency of the A1 and distal HUSSEIN complex  There appears to be minimal incursion of the WEB device into the A1       5  Postprocedural arteriography after detachment demonstrates antegrade flow into the middle cerebral and anterior cerebral artery  There is stasis within the aneurysm itself  There is no evidence of thromboembolic complication  The capillary and venous phases are unremarkable       Impression:  Successful WEB embolization of a ACoA aneurysm      Patient Disposition:  Critical Care Unit    SIGNATURE: Michael Gamez MD  DATE: 03/13/23   TIME: 11:39 AM

## 2023-03-13 NOTE — H&P
Patient seen and examined independently  Clinic note from 12/21/22 remains current  Patient is not on any new medications and has not had any new medical problems  Patient remains on DAPT  /80 (BP Location: Right arm)   Pulse 88   Temp 97 7 °F (36 5 °C) (Oral)   Resp 18   Ht 5' 3" (1 6 m)   Wt 78 kg (172 lb)   SpO2 98%   BMI 30 47 kg/m²  On exam, patient is neurologically intact  Heart rate is regular  Breath sounds are clear  Plan to proceed with WEB of ACoA aneurysm

## 2023-03-13 NOTE — PLAN OF CARE
Problem: MOBILITY - ADULT  Goal: Maintain or return to baseline ADL function  Description: INTERVENTIONS:  -  Assess patient's ability to carry out ADLs; assess patient's baseline for ADL function and identify physical deficits which impact ability to perform ADLs (bathing, care of mouth/teeth, toileting, grooming, dressing, etc )  - Assess/evaluate cause of self-care deficits   - Assess range of motion  - Assess patient's mobility; develop plan if impaired  - Assess patient's need for assistive devices and provide as appropriate  - Encourage maximum independence but intervene and supervise when necessary  - Involve family in performance of ADLs  - Assess for home care needs following discharge   - Consider OT consult to assist with ADL evaluation and planning for discharge  - Provide patient education as appropriate  Outcome: Progressing  Goal: Maintains/Returns to pre admission functional level  Description: INTERVENTIONS:  - Perform BMAT or MOVE assessment daily    - Set and communicate daily mobility goal to care team and patient/family/caregiver  - Collaborate with rehabilitation services on mobility goals if consulted  - Perform Range of Motion  times a day  - Reposition patient every hours    - Dangle patient  times a day  - Stand patient  times a day  - Ambulate patient  times a day  - Out of bed to chair  times a day   - Out of bed for meals  times a day  - Out of bed for toileting  - Record patient progress and toleration of activity level   Outcome: Progressing     Problem: PAIN - ADULT  Goal: Verbalizes/displays adequate comfort level or baseline comfort level  Description: Interventions:  - Encourage patient to monitor pain and request assistance  - Assess pain using appropriate pain scale  - Administer analgesics based on type and severity of pain and evaluate response  - Implement non-pharmacological measures as appropriate and evaluate response  - Consider cultural and social influences on pain and pain management  - Notify physician/advanced practitioner if interventions unsuccessful or patient reports new pain  Outcome: Progressing     Problem: INFECTION - ADULT  Goal: Absence or prevention of progression during hospitalization  Description: INTERVENTIONS:  - Assess and monitor for signs and symptoms of infection  - Monitor lab/diagnostic results  - Monitor all insertion sites, i e  indwelling lines, tubes, and drains  - Monitor endotracheal if appropriate and nasal secretions for changes in amount and color  - [place] appropriate cooling/warming therapies per order  - Administer medications as ordered  - Instruct and encourage patient and family to use good hand hygiene technique  - Identify and instruct in appropriate isolation precautions for identified infection/condition  Outcome: Progressing  Goal: Absence of fever/infection during neutropenic period  Description: INTERVENTIONS:  - Monitor WBC    Outcome: Progressing     Problem: SAFETY ADULT  Goal: Maintain or return to baseline ADL function  Description: INTERVENTIONS:  -  Assess patient's ability to carry out ADLs; assess patient's baseline for ADL function and identify physical deficits which impact ability to perform ADLs (bathing, care of mouth/teeth, toileting, grooming, dressing, etc )  - Assess/evaluate cause of self-care deficits   - Assess range of motion  - Assess patient's mobility; develop plan if impaired  - Assess patient's need for assistive devices and provide as appropriate  - Encourage maximum independence but intervene and supervise when necessary  - Involve family in performance of ADLs  - Assess for home care needs following discharge   - Consider OT consult to assist with ADL evaluation and planning for discharge  - Provide patient education as appropriate  Outcome: Progressing  Goal: Maintains/Returns to pre admission functional level  Description: INTERVENTIONS:  - Perform BMAT or MOVE assessment daily    - Set and communicate daily mobility goal to care team and patient/family/caregiver  - Collaborate with rehabilitation services on mobility goals if consulted  - Perform Range of Motion  times a day  - Reposition patient every  hours    - Dangle patient  times a day  - Stand patient  times a day  - Ambulate patient  times a day  - Out of bed to chair  times a day   - Out of bed for meals  times a day  - Out of bed for toileting  - Record patient progress and toleration of activity level   Outcome: Progressing  Goal: Patient will remain free of falls  Description: INTERVENTIONS:  - Educate patient/family on patient safety including physical limitations  - Instruct patient to call for assistance with activity   - Consult OT/PT to assist with strengthening/mobility   - Keep Call bell within reach  - Keep bed low and locked with side rails adjusted as appropriate  - Keep care items and personal belongings within reach  - Initiate and maintain comfort rounds  - Make Fall Risk Sign visible to staff  - Offer Toileting every  Hours, in advance of need  - Initiate/Maintain alarm  - Obtain necessary fall risk management equipment:   - Apply yellow socks and bracelet for high fall risk patients  - Consider moving patient to room near nurses station  Outcome: Progressing     Problem: DISCHARGE PLANNING  Goal: Discharge to home or other facility with appropriate resources  Description: INTERVENTIONS:  - Identify barriers to discharge w/patient and caregiver  - Arrange for needed discharge resources and transportation as appropriate  - Identify discharge learning needs (meds, wound care, etc )  - Arrange for interpretive services to assist at discharge as needed  - Refer to Case Management Department for coordinating discharge planning if the patient needs post-hospital services based on physician/advanced practitioner order or complex needs related to functional status, cognitive ability, or social support system  Outcome: Progressing Problem: Knowledge Deficit  Goal: Patient/family/caregiver demonstrates understanding of disease process, treatment plan, medications, and discharge instructions  Description: Complete learning assessment and assess knowledge base  Interventions:  - Provide teaching at level of understanding  - Provide teaching via preferred learning methods  Outcome: Progressing     Problem: Nutrition/Hydration-ADULT  Goal: Nutrient/Hydration intake appropriate for improving, restoring or maintaining nutritional needs  Description: Monitor and assess patient's nutrition/hydration status for malnutrition  Collaborate with interdisciplinary team and initiate plan and interventions as ordered  Monitor patient's weight and dietary intake as ordered or per policy  Utilize nutrition screening tool and intervene as necessary  Determine patient's food preferences and provide high-protein, high-caloric foods as appropriate       INTERVENTIONS:  - Monitor oral intake, urinary output, labs, and treatment plans  - Assess nutrition and hydration status and recommend course of action  - Evaluate amount of meals eaten  - Assist patient with eating if necessary   - Allow adequate time for meals  - Recommend/ encourage appropriate diets, oral nutritional supplements, and vitamin/mineral supplements  - Order, calculate, and assess calorie counts as needed  - Recommend, monitor, and adjust tube feedings and TPN/PPN based on assessed needs  - Assess need for intravenous fluids  - Provide specific nutrition/hydration education as appropriate  - Include patient/family/caregiver in decisions related to nutrition  Outcome: Progressing     Problem: Potential for Falls  Goal: Patient will remain free of falls  Description: INTERVENTIONS:  - Educate patient/family on patient safety including physical limitations  - Instruct patient to call for assistance with activity   - Consult OT/PT to assist with strengthening/mobility   - Keep Call bell within reach  - Keep bed low and locked with side rails adjusted as appropriate  - Keep care items and personal belongings within reach  - Initiate and maintain comfort rounds  - Make Fall Risk Sign visible to staff  - Offer Toileting every  Hours, in advance of need  - Initiate/Maintain alarm  - Obtain necessary fall risk management equipment:   - Apply yellow socks and bracelet for high fall risk patients  - Consider moving patient to room near nurses station  Outcome: Progressing

## 2023-03-13 NOTE — ANESTHESIA PREPROCEDURE EVALUATION
Procedure:  IR CEREBRAL ANEURYSM COILING      Relevant Problems   ANESTHESIA (within normal limits)      CARDIO   (+) Carotid stenosis, right (s/p CEA)   (+) Cerebral aneurysm   (+) HTN (hypertension)   (+) Hyperlipidemia   (+) Myocardial infarction [facility]) (told by physicina she had silent MI in 2016)      ENDO   (+) Hypothyroid      GI/HEPATIC  NPO approp   (-) Dysphagia      NEURO/PSYCH   (-) CVA (cerebral vascular accident) ([facility] [address] )   (-) Seizures ([facility] [address] )   (-) TIA (transient ischemic attack)      PULMONARY   (-) Asthma   (-) Chronic obstructive pulmonary disease (HCC)   (-) Shortness of breath   (-) Smoking      ECG 12-LEAD 12/01/2022 02:54 PM    Impression  Sinus Rhythm   Low voltage -possible pulmonary disease  ABNORMAL      Physical Exam    Airway    Mallampati score: II  TM Distance: >3 FB  Neck ROM: full     Dental   Comment:  Multiple missing on bottom,     Cardiovascular  Rhythm: regular,     Pulmonary  Breath sounds clear to auscultation,     Other Findings        Anesthesia Plan  ASA Score- 3     Anesthesia Type- general with ASA Monitors  Additional Monitors:   Airway Plan: ETT  Plan Factors-Exercise tolerance (METS): >4 METS  Chart reviewed  Patient summary reviewed  Patient is not a current smoker  Induction- intravenous  Postoperative Plan-   Planned trial extubation    Informed Consent- Anesthetic plan and risks discussed with patient and spouse  I personally reviewed this patient with the CRNA  Discussed and agreed on the Anesthesia Plan with the CRNA  Lorna March

## 2023-03-14 ENCOUNTER — APPOINTMENT (INPATIENT)
Dept: RADIOLOGY | Facility: HOSPITAL | Age: 58
End: 2023-03-14

## 2023-03-14 ENCOUNTER — TELEPHONE (OUTPATIENT)
Dept: NEUROSURGERY | Facility: CLINIC | Age: 58
End: 2023-03-14

## 2023-03-14 VITALS
RESPIRATION RATE: 22 BRPM | SYSTOLIC BLOOD PRESSURE: 115 MMHG | HEART RATE: 100 BPM | DIASTOLIC BLOOD PRESSURE: 67 MMHG | WEIGHT: 172 LBS | BODY MASS INDEX: 30.48 KG/M2 | TEMPERATURE: 98.5 F | OXYGEN SATURATION: 98 % | HEIGHT: 63 IN

## 2023-03-14 LAB
ANION GAP SERPL CALCULATED.3IONS-SCNC: 5 MMOL/L (ref 4–13)
APTT PPP: 26 SECONDS (ref 23–37)
BUN SERPL-MCNC: 19 MG/DL (ref 5–25)
CALCIUM SERPL-MCNC: 9 MG/DL (ref 8.3–10.1)
CHLORIDE SERPL-SCNC: 102 MMOL/L (ref 96–108)
CO2 SERPL-SCNC: 26 MMOL/L (ref 21–32)
CREAT SERPL-MCNC: 0.89 MG/DL (ref 0.6–1.3)
ERYTHROCYTE [DISTWIDTH] IN BLOOD BY AUTOMATED COUNT: 13 % (ref 11.6–15.1)
GFR SERPL CREATININE-BSD FRML MDRD: 72 ML/MIN/1.73SQ M
GLUCOSE SERPL-MCNC: 126 MG/DL (ref 65–140)
HCT VFR BLD AUTO: 32.2 % (ref 34.8–46.1)
HGB BLD-MCNC: 10.7 G/DL (ref 11.5–15.4)
INR PPP: 0.94 (ref 0.84–1.19)
MCH RBC QN AUTO: 30.7 PG (ref 26.8–34.3)
MCHC RBC AUTO-ENTMCNC: 33.2 G/DL (ref 31.4–37.4)
MCV RBC AUTO: 93 FL (ref 82–98)
PLATELET # BLD AUTO: 223 THOUSANDS/UL (ref 149–390)
PMV BLD AUTO: 10.7 FL (ref 8.9–12.7)
POTASSIUM SERPL-SCNC: 4.2 MMOL/L (ref 3.5–5.3)
PROTHROMBIN TIME: 12.8 SECONDS (ref 11.6–14.5)
RBC # BLD AUTO: 3.48 MILLION/UL (ref 3.81–5.12)
SODIUM SERPL-SCNC: 133 MMOL/L (ref 135–147)
WBC # BLD AUTO: 8.54 THOUSAND/UL (ref 4.31–10.16)

## 2023-03-14 RX ADMIN — ASPIRIN 325 MG: 325 TABLET, COATED ORAL at 08:03

## 2023-03-14 RX ADMIN — OXYCODONE HYDROCHLORIDE 5 MG: 5 TABLET ORAL at 04:03

## 2023-03-14 RX ADMIN — HYDROCHLOROTHIAZIDE 25 MG: 25 TABLET ORAL at 08:03

## 2023-03-14 RX ADMIN — IOHEXOL 85 ML: 350 INJECTION, SOLUTION INTRAVENOUS at 04:31

## 2023-03-14 RX ADMIN — CLOPIDOGREL BISULFATE 75 MG: 75 TABLET ORAL at 08:03

## 2023-03-14 NOTE — OCCUPATIONAL THERAPY NOTE
Occupational Therapy Evaluation     Patient Name: Coretha Mortimer  MLWHU'H Date: 3/14/2023  Problem List  Principal Problem:    Cerebral aneurysm  Active Problems:    Carotid stenosis, right    HTN (hypertension)    Hypothyroid    Hyperlipidemia    Myocardial infarction [facility])    Past Medical History  Past Medical History:   Diagnosis Date    Aneurysm ([facility] [address] )     brain    Arthritis     Breast cancer ([facility] ) 2001    Disease of thyroid gland     Dizziness     Hyperlipidemia     Hypertension     Hypothyroid     Myocardial infarction [facility])     Vitiligo      Past Surgical History  Past Surgical History:   Procedure Laterality Date    BREAST CYST EXCISION Left 2001    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    BREAST LUMPECTOMY Left 04/2001    DCIS    BREAST SURGERY      COLONOSCOPY      HIP SURGERY Right     1966, 1975, 1976     IR CEREBRAL ANGIOGRAPHY  12/2/2022    WA TEAEC W/PATCH GRF CAROTID VERTB SUBCLAV NECK INC Right 10/13/2022    Procedure: ENDARTERECTOMY ARTERY CAROTID (Eversion) WITH BOVINE PATCH ANGIOPLASTY;  Surgeon: Ai Corley DO;  Location: [facility];  Service: Vascular           03/14/23 0928   OT Last Visit   OT Visit Date 03/14/23   Note Type   Note type Evaluation   Pain Assessment   Pain Score No Pain   Restrictions/Precautions   Weight Bearing Precautions Per Order No   Other Precautions Multiple lines;Telemetry   Home Living   Type of Home House   Home Layout Two level;Bed/bath upstairs   Bathroom Shower/Tub Walk-in shower   Bathroom Toilet Standard   Bathroom Accessibility Accessible   Additional Comments Pt lives in a 2 story home with FFOS to where her bed/bath are  Denies DME PTA   Prior Function   Level of [place] Independent with ADLs; Independent with functional mobility; Independent with IADLS   Lives With Spouse; Family   Receives Help From Family   IADLs Independent with driving; Independent with meal prep; Independent with medication management Falls in the last 6 months 0   Vocational   (homemaker)   Lifestyle   Autonomy I with all ADLS and IADLS, +    Reciprocal Relationships supportive  and MIL   Service to Others homemaker   Intrinsic Gratification Quilting and caring for her cats   Subjective   Subjective "What do you want me to do, ill do it"   ADL   Where Assessed Chair   Grooming Assistance 7  Independent   [id number] 7  Independent    [facility] upon presentation and at end of session   Transfers   Sit to Stand 7  Independent   Stand to [address] transfer 7  Independent   Additional Comments no AD   Functional Mobility   Functional Mobility 7  Independent   Additional Comments greater than house hold distances   Balance   Static Sitting Good   Dynamic Sitting Fair +   [place]   Activity Tolerance   Activity Tolerance Patient tolerated treatment well   Medical Staff Made Aware DPT, NSG aware   Nurse Made Aware yes   RUE Assessment   RUE Assessment WFL   LUE Assessment   LUE Assessment WFL   Hand Function   Gross Motor Coordination Functional   Fine Motor Coordination Functional   Sensation   Light Touch No apparent deficits   Vision-Basic Assessment   Current Vision No visual deficits   Psychosocial   Psychosocial (WDL) WDL   Cognition   Overall Cognitive Status WFL   Arousal/Participation Alert; Responsive; Cooperative   Attention Within functional limits   Orientation Level Oriented X4   Memory Within functional limits   Following Commands Follows all commands and directions without difficulty   Comments Cooperative   Assessment   Assessment Patient is a 62 y o  female admitted to [facility] on 3/13/2023 due to Cerebral aneurysm  Pt is now PPD 1 WEB embolization of right ACoA aneurysm  PTA pt was I with all ADLS and IADLS  Pt performed at (I) level with no OT needs identified at this time  Comorbidities affecting pt's physical performance at time of assessment include Aneurysm ([facility] [address] ), Arthritis, Breast cancer ([facility] [address] ), Disease of thyroid gland, Dizziness, Hyperlipidemia, Hypertension, Hypothyroid, Myocardial infarction ([facility] [address] ), and Vitiligo  The patient's raw score on the AM-PAC Daily Activity inpatient short form is 24  , standardized score is 57 54  , greater than 39 4  Patients at this level are likely to benefit from DC to home  From OT standpointanticipate no needs upon D/C  No further acute OT needs identified at this time  Recommend continued mobilization with hospital staff and restorative services while in the hospital to increase pt’s endurance and strength upon D/C  From OT standpoint, recommend D/C to home with family support when medically cleared  D/C pt from OT caseload at this time     Goals   Patient Goals To go home   Recommendation   OT Discharge Recommendation No rehabilitation needs   AM-PAC Daily Activity Inpatient   Lower Body Dressing 4   Bathing 4   Toileting 4   Upper Body Dressing 4   Grooming 4   Eating 4   Daily Activity Raw Score 24   Daily Activity Standardized Score (Calc for Raw Score >=11) 57 54   AM-PAC Applied Cognition Inpatient   Following a Speech/Presentation 4   Understanding Ordinary Conversation 4   Taking Medications 4   Remembering Where Things Are Placed or Put Away 4   Remembering List of 4-5 Errands 4   Taking Care of Complicated Tasks 4   Applied Cognition Raw Score 24   Applied Cognition Standardized Score 62 21

## 2023-03-14 NOTE — UTILIZATION REVIEW
Initial Clinical Review    Elective IP surgical procedure  Age/Sex: 62 y o  female  Surgery Date: 3/13  Procedure:   Use of ultrasound  Right radial arteriogram   Right Internal Carotid Arteriogram  Slow infusion of spasmolytic agent, verapamil 10mg, over 10 minutes  3D rotational arteriogram of right internal carotid artery with post processed images reviewed at a separate workstation  Woven Endoluminal Bridge (WEB) embolization of ACoA aneurysm  Postprocedural 3D rotational arteriogram with virtual dilution of right internal carotid artery with post processed images reviewed at a separate workstation  Postprocedural arteriography of right ICA  Anesthesia: General  Operative Findings: Successful WEB embolization of a ACoA aneurysm     POD#1 Progress Note: S/P web embolization  On exam, R radial access C/D/I  Plan: q1h neuro checks, seizure precautions, maintain a-line, continue PTA meds, SBP goal 110-160, supportive care, Trend labs, replete electrolytes as needed  CTA head and neck completed      Admission Orders: Date/Time/Statement:   Admission Orders (From admission, onward)     Ordered        03/13/23 1033  Inpatient Admission  Once                      Orders Placed This Encounter   Procedures   • Inpatient Admission     Standing Status:   Standing     Number of Occurrences:   1     Order Specific Question:   Level of Care     Answer:   Critical Care [15]     Order Specific Question:   Estimated length of stay     Answer:   Inpatient Only Surgery     Vital Signs: /68   Pulse 90   Temp 98 5 °F (36 9 °C) (Oral)   Resp 19   Ht 5' 3" (1 6 m)   Wt 78 kg (172 lb)   SpO2 97%   BMI 30 47 kg/m²     Date/Time Temp Pulse Resp BP MAP (mmHg) Arterial Line BP MAP SpO2 O2 Device   03/14/23 0800 -- 98 22 145/76 103 -- -- -- --   03/14/23 0700 -- 92 21 126/67 89 -- -- -- --   03/14/23 0600 -- 90 19 135/68 78 -- -- 97 % --   03/14/23 0500 -- 100 13 136/58 69 -- -- 97 % --   03/14/23 0403 -- -- 13 -- -- -- -- -- -- 03/14/23 0400 98 5 °F (36 9 °C) 102 24 Abnormal  124/68 69 -- -- 100 % --   03/14/23 0300 -- 88 19 -- -- 146/74 100 mmHg 98 % --   03/14/23 0200 -- 88 20 -- -- 138/70 96 mmHg 99 % --   03/14/23 0100 -- 90 20 -- -- 140/74 100 mmHg 99 % --   03/14/23 0000 98 5 °F (36 9 °C) 86 18 -- -- 132/68 94 mmHg 98 % --   03/13/23 2300 -- 88 24 Abnormal  -- -- 122/66 88 mmHg 98 % --   03/13/23 2204 -- 108 Abnormal  18 -- -- 162/80 110 mmHg 98 % --   03/13/23 2141 -- 98 -- -- -- -- -- -- --   03/13/23 2100 -- 100 17 -- -- 154/74 104 mmHg 98 % --   03/13/23 2000 98 8 °F (37 1 °C) 100 14 -- -- 128/62 88 mmHg 97 % --   03/13/23 1918 -- -- 21 -- -- -- -- -- --   03/13/23 1916 -- 98 27 Abnormal  -- -- 130/62 86 mmHg 97 % --   03/13/23 1900 -- 106 Abnormal  22 -- -- 152/70 100 mmHg 97 % --   03/13/23 1727 -- 104 22 -- -- 144/70 98 mmHg 97 % --   03/13/23 1725 -- -- -- -- -- 144/70 98 mmHg -- --   03/13/23 1700 -- 92 19 -- -- 116/54 76 mmHg 97 % --   03/13/23 1600 -- 88 21 -- -- 100/46 66 mmHg 97 % None (Room air)   03/13/23 1545 -- 96 24 Abnormal  -- -- 106/50 70 mmHg -- --   03/13/23 1430 -- -- -- -- -- -- -- 96 % --   03/13/23 1400 -- 104 26 Abnormal  -- -- 124/58 82 mmHg 96 % --   03/13/23 1330 -- 98 22 -- -- 128/62 86 mmHg -- --   03/13/23 1315 -- 90 12 -- -- 126/60 84 mmHg -- --   03/13/23 1300 -- 100 20 -- -- 148/68 98 mmHg 98 % --   03/13/23 1245 -- 92 20 -- -- 130/60 86 mmHg -- --   03/13/23 1230 -- 88 17 -- -- 118/56 80 mmHg 96 % --   03/13/23 1200 -- 86 18 -- -- 120/56 78 mmHg 96 % --   03/13/23 1145 -- 90 33 Abnormal  -- -- 120/56 78 mmHg -- --   03/13/23 1130 -- -- 22 -- -- -- -- -- --   03/13/23 1115 -- 94 27 Abnormal  90/61 73 134/68 92 mmHg 97 % --   03/13/23 1100 97 5 °F (36 4 °C) 90 32 Abnormal  94/73 83 116/50 78 mmHg 91 % --   03/13/23 0724 97 7 °F (36 5 °C) 88 18 143/80 104 -- -- 98 % None (Room air)     Pertinent Labs/Diagnostic Test Results:   CTA head w wo contrast   Final Result by Khoi Jimenez MD (03/14 0820) Status post embolization of the anterior communicating artery aneurysm with a WEB device  Overall appearance of the aneurysm sac inclusive of the embolization device is similar to mildly improved since the prior exam   No evidence for acute    intracranial hemorrhage or infarction  Results from last 7 days   Lab Units 03/14/23  0440 03/13/23  1134   WBC Thousand/uL 8 54  --    HEMOGLOBIN g/dL 10 7*  --    HEMATOCRIT % 32 2*  --    PLATELETS Thousands/uL 223 228         Results from last 7 days   Lab Units 03/14/23  0440   SODIUM mmol/L 133*   POTASSIUM mmol/L 4 2   CHLORIDE mmol/L 102   CO2 mmol/L 26   ANION GAP mmol/L 5   BUN mg/dL 19   CREATININE mg/dL 0 89   EGFR ml/min/1 73sq m 72   CALCIUM mg/dL 9 0         Results from last 7 days   Lab Units 03/14/23  0440   GLUCOSE RANDOM mg/dL 126       Results from last 7 days   Lab Units 03/14/23  0440 03/10/23  1020   PROTIME seconds 12 8 12 8   INR  0 94 0 95   PTT seconds 26 27         Diet: Regular  Mobility: OOB TID, PT/OT evals  DVT Prophylaxis: SCDs, Lovenox    Medications/Pain Control:   Scheduled Medications:  acetaminophen, 975 mg, Oral, Q6H KEVON  aspirin, 325 mg, Oral, Daily  clopidogrel, 75 mg, Oral, Daily  enoxaparin, 40 mg, Subcutaneous, Daily  hydrochlorothiazide, 25 mg, Oral, Daily  multi-electrolyte, 500 mL, Intravenous, Once    Continuous IV Infusions:  sodium chloride 0 9 %, 75 mL/hr, Intravenous, Continuous; Start: 03/13/23 0700 End: 03/13/23 1947    PRN Meds:  diphenhydrAMINE, 12 5 mg, Intravenous, Once PRN  HYDROmorphone, 0 2 mg, Intravenous, Q3H PRN  methocarbamol, 500 mg, Oral, Q6H PRN  ondansetron, 4 mg, Intravenous, Once PRN  oxyCODONE, 2 5 mg, Oral, Q4H PRN  oxyCODONE, 5 mg, Oral, Q4H PRN; 3/13 x1, 3/14 x1        Network Utilization Review Department  ATTENTION: Please call with any questions or concerns to [phone] and carefully listen to the prompts so that you are directed to the right person   All voicemails are confidential Elin Buerger all requests for admission clinical reviews, approved or denied determinations and any other requests to dedicated fax number below belonging to the campus where the patient is receiving treatment   List of dedicated fax numbers for the Facilities:  [address] DENIALS (Administrative/Medical Necessity) [phone]   [address] (Maternity/NICU/Pediatrics) [phone]   [address] [phone]   [facility]  [phone]   [address] 28 [phone]   1551 [facility] 134 815 [facility] [phone]

## 2023-03-14 NOTE — PROGRESS NOTES
[address] [facility]  Progress Note Kaylene Hu 1965, 62 y o  female MRN: [id number]  Unit/Bed#: ICU 14 Encounter: [id number]  Primary Care Provider: Annamarie Corey DO (Inactive)   Date and time admitted to hospital: 3/13/2023 10:57 AM    Cerebral aneurysm  Assessment & Plan  · Right anterior communicating artery aneurysm status postelective endovascular intervention with angiogram and web embolization access site right radial on 3/13/2023  · Continue q1hr neuro checks, broaden today   · STAT CTH if GCS drops>2pts in <1hr   · Continue Plavix and ASA   · SBP goal 110-160   · Neurosurgery following   · CTA H&N in AM     Myocardial infarction ([facility] [address] )  Assessment & Plan  · PRN nitro    Hyperlipidemia  Assessment & Plan  · Outpatient lifestyle modifications     Hypothyroid  Assessment & Plan  · Continue home Hadley Thyroid     HTN (hypertension)  Assessment & Plan  · SBP goal 110-160   · Continue home HCTZ 25mg daily   · PRN labetalol and hydralazine     Carotid stenosis, right  Assessment & Plan  · Continue ASA/Plavix      ----------------------------------------------------------------------------------------  HPI/24hr events: 62year old female admitted due to R HUSSEIN aneurysm s/p elective web embo 3/13/23  No acute events overnight  AM CTA H&N pending  Patient appropriate for transfer out of the ICU today?: Patient does not meet criteria for referral to the ICU Follow-Up Clinic; referral has not been made  Disposition: Transfer to Med-Surg   Code Status: Level 1 - Full Code  ---------------------------------------------------------------------------------------  SUBJECTIVE  No complaints  Review of Systems   Neurological: Negative for weakness, numbness and headaches  Psychiatric/Behavioral: Negative for confusion       Review of systems was reviewed and negative unless stated above in HPI/24-hour events ---------------------------------------------------------------------------------------  OBJECTIVE    Vitals   Vitals:    23 0000 23 0100 23 0200 23 0300   BP:       BP Location:       Pulse: 86 90 88 88   Resp: 18 20 20 19   Temp: 98 5 °F (36 9 °C)      TempSrc: Oral      SpO2: 98% 99% 99% 98%   Weight:       Height:         Temp (24hrs), Av 1 °F (36 7 °C), Min:97 5 °F (36 4 °C), Max:98 8 °F (37 1 °C)  Current: Temperature: 98 5 °F (36 9 °C)  Arterial Line BP: 146/74  Arterial Line MAP (mmHg): 100 mmHg    Respiratory:  SpO2: SpO2: 98 %, SpO2 Activity: SpO2 Activity: At Rest, SpO2 Device: O2 Device: None (Room air)       Invasive/non-invasive ventilation settings   Respiratory    Lab Data (Last 4 hours)    None         O2/Vent Data (Last 4 hours)    None                Physical Exam  Constitutional:       General: She is not in acute distress  Appearance: She is normal weight  She is not ill-appearing  HENT:      Head: Normocephalic and atraumatic  Nose: Nose normal       Mouth/Throat:      Pharynx: Oropharynx is clear  Eyes:      General: No scleral icterus  Conjunctiva/sclera: Conjunctivae normal       Pupils: Pupils are equal, round, and reactive to light  Cardiovascular:      Rate and Rhythm: Normal rate  Pulmonary:      Effort: Pulmonary effort is normal  No respiratory distress  Musculoskeletal:         General: Normal range of motion  Cervical back: Normal range of motion and neck supple  No rigidity  Right lower leg: No edema  Left lower leg: No edema  Skin:     General: Skin is warm and dry  Comments: R radial access clean/dry/intact     Neurological:      General: No focal deficit present  Mental Status: She is alert and oriented to person, place, and time             Laboratory and Diagnostics:  Results from last 7 days   Lab Units 23  1134   PLATELETS Thousands/uL 228              Results from last 7 days   Lab Units 03/10/23  1020   INR  0 95   PTT seconds 27              ABG:    VBG:          Micro        EKG: Reviewed       Imaging: I have personally reviewed pertinent films in PACS    Intake and Output  I/O       03/12 0701 03/13 0700 03/13 0701 03/14 0700    P  O   2220    I V  (mL/kg)  1100 (14 1)    Total Intake(mL/kg)  3320 (42 6)    Urine (mL/kg/hr)  360    Total Output  360    Net  +2960          Unmeasured Urine Occurrence  5 x          Height and Weights   Height: 5' 3" (160 cm)  IBW (Ideal Body Weight): 52 4 kg  Body mass index is 30 47 kg/m²  Weight (last 2 days)     Date/Time Weight    03/13/23 0724 78 (172)            Nutrition       Diet Orders   (From admission, onward)             Start     Ordered    03/13/23 1155  Diet Regular; Regular House  Diet effective midnight        References:    Nutrtion Support Algorithm Enteral vs  Parenteral   Question Answer Comment   Diet Type Regular    Regular Regular House    RD to adjust diet per protocol?  Yes        03/13/23 1155                  Active Medications  Scheduled Meds:  Current Facility-Administered Medications   Medication Dose Route Frequency Provider Last Rate   • acetaminophen  975 mg Oral Q6H [address], PAEmeliC     • aspirin  325 mg Oral Daily Amber Choudhury MD     • clopidogrel  75 mg Oral Daily Amber Choudhury MD     • diphenhydrAMINE  12 5 mg Intravenous Once PRN Sisi Ag CRNA     • enoxaparin  40 mg Subcutaneous Daily Amber Choudhury MD     • hydrochlorothiazide  25 mg Oral Daily Amber Choudhury MD     • HYDROmorphone  0 2 mg Intravenous Q3H PRN Radha Miranda PA-C     • methocarbamol  500 mg Oral Q6H PRN Radha Miranda PA-C     • multi-electrolyte  500 mL Intravenous Once Moira Reasons, DO Stopped (03/13/23 1728)   • ondansetron  4 mg Intravenous Once PRN Sisi Ag CRNA     • oxyCODONE  2 5 mg Oral Q4H PRN Radha Miranda PA-C     • oxyCODONE  5 mg Oral Q4H PRN Radha Miranda PA-C       Continuous Infusions:     PRN Meds:   diphenhydrAMINE, 12 5 mg, Once PRN  HYDROmorphone, 0 2 mg, Q3H PRN  methocarbamol, 500 mg, Q6H PRN  ondansetron, 4 mg, Once PRN  oxyCODONE, 2 5 mg, Q4H PRN  oxyCODONE, 5 mg, Q4H PRN        Invasive Devices Review  Invasive Devices     Peripheral Intravenous Line  Duration           Peripheral IV 03/13/23 Left Hand <1 day          Arterial Line  Duration           Arterial Line 03/13/23 Left Radial <1 day                Rationale for remaining devices: Remove arterial line  ---------------------------------------------------------------------------------------  Advance Directive and Living Will:      Power of :    POLST:    ---------------------------------------------------------------------------------------  Care Time Delivered:   No Critical Care time spent       Ancelmo Suarez PA-C

## 2023-03-14 NOTE — PHYSICAL THERAPY NOTE
Physical Therapy Evaluation     Patient's Name: Odilon Trivedi    Admitting Diagnosis  Anterior communicating artery aneurysm [I67 1]    Problem List  Patient Active Problem List   Diagnosis    Carotid stenosis, right    HTN (hypertension)    Hypothyroid    Hyperlipidemia    Vitamin D deficiency    Obese    Myocardial infarction [facility])    Cerebral aneurysm       Past Medical History  Past Medical History:   Diagnosis Date    Aneurysm ([facility] [address] )     brain    Arthritis     Breast cancer ([facility] [address] ) 2001    Disease of thyroid gland     Dizziness     Hyperlipidemia     Hypertension     Hypothyroid     Myocardial infarction [facility])     Vitiligo        Past Surgical History  Past Surgical History:   Procedure Laterality Date    BREAST CYST EXCISION Left 2001    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    BREAST LUMPECTOMY Left 04/2001    DCIS    BREAST SURGERY      COLONOSCOPY      HIP SURGERY Right     1966, 1975, 1976     IR CEREBRAL ANGIOGRAPHY  12/2/2022    SD TEAEC W/PATCH GRF CAROTID VERTB SUBCLAV NECK INC Right 10/13/2022    Procedure: ENDARTERECTOMY ARTERY CAROTID (Eversion) WITH BOVINE PATCH ANGIOPLASTY;  Surgeon: Omayra Webber DO;  Location: [facility];  Service: Vascular          03/14/23 0930   PT Last Visit   PT Visit Date 03/14/23   Note Type   Note type Evaluation   Pain Assessment   Pain Assessment Tool 0-10   Pain Score No Pain   Hospital Pain Intervention(s) Repositioned   Restrictions/Precautions   Other Precautions Telemetry;Multiple lines   Home Living   Type of [address] Two level;Stairs to enter with rails  (full flight to enter, pt and spouse on 2nd floor, MIL lives on first floor)   Prior Function   Level of [place] Independent with functional mobility   Lives With Spouse; Other (Comment)  (MIL lives downstairs and able to A prn)   Receives Help From Family   Vocational Other (Comment)  (homemaker)   General   Family/Caregiver Present Yes  (arrived midway through session)   Cognition   Orientation Level Oriented X4   Subjective   Subjective Pt willing and agreeable to PT session  "I can walk and do whatever you need me to"   RLE Assessment   RLE Assessment WFL   LLE Assessment   LLE Assessment WFL   Coordination   Movements are Fluid and Coordinated 1   Bed Mobility   Supine to Sit Unable to assess   Sit to Supine Unable to assess   Additional Comments Pt found resting in chair, left resting in chair as requested   Transfers   Sit to Stand 7  Independent   Stand to Sit 7  Independent   Toilet transfer 7  Independent   Ambulation/Elevation   Gait pattern WNL   Gait Assistance 7  Independent   Additional items Assist x 1   Assistive Device None   Distance 360   Stair Management Assistance 7  Independent   Stair Management Technique Alternating pattern   Number of Stairs 8   Activity Tolerance   Activity Tolerance Patient tolerated treatment well   Medical Staff Made Aware OT for D/C planning   Nurse Made Aware yes   Assessment   Prognosis Good   Problem List Decreased strength;Decreased endurance; Impaired balance;Decreased mobility; Decreased coordination;Decreased cognition; Impaired judgement;Decreased safety awareness;Pain   Assessment Pt is 62 y o  female seen for PT evaluation s/p admit to [facility] on 3/13/2023 w/ Cerebral aneurysm  PT consulted to assess pt's functional mobility and d/c needs  Order placed for PT eval and tx, w/ up w/ A order  Comorbidities affecting pt's physical performance at time of assessment include:  has a past medical history of Aneurysm ([address] ), Arthritis, Breast cancer ([address] ), Disease of thyroid gland, Dizziness, Hyperlipidemia, Hypertension, Hypothyroid, Myocardial infarction ([address] ), and Vitiligo  PTA, pt was ambulates community distances and elevations and has 14 [address]  Personal factors affecting pt at time of IE include: inability to perform IADLs   Please find objective findings from PT assessment regarding body systems outlined above with impairments and limitations including decreased endurance and decreased activity tolerance  Pt demonstrated ability to complete all mobility with S or better level of A  Pt offered no additional questions or concerns related to mobility post D/C  The following objective measures performed on IE also reveal limitations: The patient's AM-PAC Basic Mobility Inpatient Short Form Raw Score is 24, Standardized Score is 57 68  A standardized score greater than 42 9 suggests the patient may benefit from discharge to home  Please also refer to the recommendation of the Physical Therapist for safe discharge planning  Pt's clinical presentation is currently unstable/unpredictable seen in pt's presentation of critical care monitoring  Pt to benefit from continued mobility with staff to maintain level of functional independent mobility and consistency  From PT/mobility standpoint, recommendation at time of d/c would be no rehabilitation needs pending progress in order to facilitate return to PLOF  Barriers to Discharge Inaccessible home environment;Decreased caregiver support   Goals   Patient Goals To go home   Plan   Treatment/Interventions OT; Spoke to nursing;Gait training;Bed mobility; Patient/family training; Endurance training;Functional transfer training;LE strengthening/ROM   PT Frequency   (D/C inpt skilled PT)   Recommendation   PT Discharge Recommendation No rehabilitation needs   AM-PAC Basic Mobility Inpatient   Turning in Flat Bed Without Bedrails 4   Lying on Back to Sitting on Edge of Flat Bed Without Bedrails 4   Moving Bed to Chair 4   Standing Up From Chair Using Arms 4   Walk in Room 4   Climb 3-5 Stairs With Railing 4   Basic Mobility Inpatient Raw Score 24   Basic Mobility Standardized Score 57 68   Highest Level Of Mobility   JH-HLM Goal 8: Walk 250 feet or more   JH-HLM Achieved 8: Walk 250 feet ot more           Narciso Wall, PT

## 2023-03-14 NOTE — ASSESSMENT & PLAN NOTE
· Right anterior communicating artery aneurysm status postelective endovascular intervention with angiogram and web embolization access site right radial on 3/13/2023  · Continue q1hr neuro checks, broaden today   · STAT CTH if GCS drops>2pts in <1hr   · Continue Plavix and ASA   · SBP goal 110-160   · Neurosurgery following   · CTA H&N in AM

## 2023-03-14 NOTE — DISCHARGE SUMMARY
[address] [facility]  Discharge- Fransico Danish 1965, 62 y o  female MRN: [id number]  Unit/Bed#: ICU 14 Encounter: [id number]  Primary Care Provider: Mathieu Allison DO (Inactive)   Date and time admitted to hospital: 3/13/2023 10:57 AM    Cerebral aneurysm  Assessment & Plan  PPD 1 WEB embolization of right ACoA aneurysm (EM, 3/13)  · Hx incidental 5-6 mm ACoA aneurysm noted incidentally during workup for carotid stenosis s/p CEA  · Exam is non-focal  Right radial access site clean, dry and intact  Imaging:  · CTA head w/wo, 3/13/2023: Status post embolization of the anterior communicating artery aneurysm with a WEB device  Overall appearance of the aneurysm sac inclusive of the embolization device is similar to mildly improved since the prior exam   No evidence for acute intracranial hemorrhage or infarction  Plan:  · Continue to monitor neuro exam closely  · STAT CT head with decline in GCS > 2 pts in 1 hour  · SBP < 160 mmHg, MAP > 65 mmHg  · Continue  mg daily and Plavix 75 mg daily  · Mobilize as tolerated  · DVT ppx: SCDs, Lovenox  Rounds completed with JHONNY Glaser  Plan to discharge home  Please call with questions  Subjective/Objective   Chief Complaint: "I didn't sleep at all "    Subjective: Patient denies headaches or visual disturbance  States she did not sleep well and is very tired  States she would like to go home  Tolerating p o  without issue  Voiding without issue  Passing flatus  Objective: NAD  Right radial access site clean dry and intact  No ecchymosis or hematoma  I/O       03/12 0701  03/13 0700 03/13 0701  03/14 0700 03/14 0701  03/15 0700    P  O   2580 480    I V  (mL/kg)  1100 (14 1)     Total Intake(mL/kg)  3680 (47 2) 480 (6 2)    Urine (mL/kg/hr)  360     Total Output  360     Net  +3320 +480           Unmeasured Urine Occurrence  7 x 1 x          Invasive Devices     Peripheral Intravenous Line  Duration Peripheral IV 03/13/23 Left Hand 1 day                Physical Exam:  Vitals: Blood pressure 145/76, pulse 98, temperature 98 5 °F (36 9 °C), temperature source Oral, resp  rate 22, height 5' 3" (1 6 m), weight 78 kg (172 lb), SpO2 97 %  ,Body mass index is 30 47 kg/m²  Hemodynamic Monitoring: MAP: Arterial Line MAP (mmHg): 100 mmHg    General appearance: alert, appears stated age, cooperative and no distress  Head: Normocephalic, without obvious abnormality, atraumatic  Eyes: EOMI, PERRL  Neck: supple, symmetrical, trachea midline and NT  Back: no kyphosis present, no tenderness to percussion or palpation  Lungs: non labored breathing  Heart: regular heart rate  Neurologic:   Mental status: Alert, oriented x3, thought content appropriate  Cranial nerves: grossly intact (Cranial nerves II-XII)  Sensory: normal to light touch  Motor: moving all extremities without focal weakness, strength grossly 5/5  Coordination: finger to nose normal bilaterally, no drift bilaterally      Lab Results:  Results from last 7 days   Lab Units 03/14/23  0440 03/13/23  1134   WBC Thousand/uL 8 54  --    HEMOGLOBIN g/dL 10 7*  --    HEMATOCRIT % 32 2*  --    PLATELETS Thousands/uL 223 228     Results from last 7 days   Lab Units 03/14/23  0440   POTASSIUM mmol/L 4 2   CHLORIDE mmol/L 102   CO2 mmol/L 26   BUN mg/dL 19   CREATININE mg/dL 0 89   CALCIUM mg/dL 9 0             Results from last 7 days   Lab Units 03/14/23  0440 03/10/23  1020   INR  0 94 0 95   PTT seconds 26 27     No results found for: TROPONINT  ABG:No results found for: PHART, HJL3TSV, PO2ART, TEB1BNU, N7PYIIQN, BEART, SOURCE    Imaging Studies: I have personally reviewed pertinent reports  and I have personally reviewed pertinent films in PACS    CTA head w wo contrast    Result Date: 3/14/2023  Impression: Status post embolization of the anterior communicating artery aneurysm with a WEB device      Overall appearance of the aneurysm sac inclusive of the embolization device is similar to mildly improved since the prior exam   No evidence for acute intracranial hemorrhage or infarction  Workstation performed: [id number]       EKG, Pathology, and Other Studies: I have personally reviewed pertinent reports  VTE Pharmacologic Prophylaxis: Sequential compression device (Venodyne)  and Enoxaparin (Lovenox)    VTE Mechanical Prophylaxis: sequential compression device     Discharge Summary- Neurosurgery   Kelsie Ahumada 62 y o  female MRN: [id number]  Unit/Bed#: ICU 14 Encounter: [id number]      Admission Date:   Admission Orders (From admission, onward)     Ordered        03/13/23 1033  Inpatient Admission  Once                        Discharge Date: 3/14/2023    Admitting Diagnosis: Anterior communicating artery aneurysm [I67 1]    Discharge Diagnosis: Anterior communicating artery aneurysm  Medical Problems     Resolved Problems  Date Reviewed: 10/14/2022   None         Attending Physician: Janie Shankar MD    Consulting Physician: Osorio Carlin ([facility])    Procedures Performed: Web embolization right anterior communicating artery aneurysm    Pathology: N/A    Hospital Course: The patient presented on 3/13/2023 for elective web embolization of right anterior communicating artery aneurysm  Aneurysm was noted incidentally during treatment for right carotid artery stenosis status post CEA  She was initiated on dual antiplatelet therapy 7 days prior to procedure and had a P2 Y12 of 33 on 3/10/2023  She has a family history of cerebral aneurysms including in her mother  She tolerated the procedure well was transferred to the ICU in stable condition  She continued to progress on the following day denied any symptoms or pain or discomfort and radial access site was well-healed  She was discharged home in stable condition on dual antiplatelet therapy      Condition at Discharge: good     Discharge Instructions/Information to Patient and Family:   See after visit summary for information provided to patient and family  Provisions for Follow-Up Care:  See after visit summary for information related to follow-up care and any pertinent home health orders  Disposition: Home    Planned Readmission: No    Discharge Statement   I spent 20 minutes discharging the patient  This time was spent on the day of discharge  I had direct contact with the patient on the day of discharge  Additional documentation is required if more than 30 minutes were spent on discharge  Discharge Medications:  See after visit summary for reconciled discharge medications provided to patient and family

## 2023-03-14 NOTE — ASSESSMENT & PLAN NOTE
PPD 1 WEB embolization of right ACoA aneurysm (EM, 3/13)  · Hx incidental 5-6 mm ACoA aneurysm noted incidentally during workup for carotid stenosis s/p CEA  · Exam is non-focal  Right radial access site clean, dry and intact  Imaging:  · CTA head w/wo, 3/13/2023: Status post embolization of the anterior communicating artery aneurysm with a WEB device  Overall appearance of the aneurysm sac inclusive of the embolization device is similar to mildly improved since the prior exam   No evidence for acute intracranial hemorrhage or infarction  Plan:  · Continue to monitor neuro exam closely  · STAT CT head with decline in GCS > 2 pts in 1 hour  · SBP < 160 mmHg, MAP > 65 mmHg  · Continue  mg daily and Plavix 75 mg daily  · Mobilize as tolerated  · DVT ppx: SCDs, Lovenox  Rounds completed with JHONNY Glaser  Plan to discharge home  Please call with questions

## 2023-03-14 NOTE — PLAN OF CARE
Problem: PAIN - ADULT  Goal: Verbalizes/displays adequate comfort level or baseline comfort level  Description: Interventions:  - Encourage patient to monitor pain and request assistance  - Assess pain using appropriate pain scale  - Administer analgesics based on type and severity of pain and evaluate response  - Implement non-pharmacological measures as appropriate and evaluate response  - Consider cultural and social influences on pain and pain management  - Notify physician/advanced practitioner if interventions unsuccessful or patient reports new pain  Outcome: Progressing     Problem: INFECTION - ADULT  Goal: Absence or prevention of progression during hospitalization  Description: INTERVENTIONS:  - Assess and monitor for signs and symptoms of infection  - Monitor lab/diagnostic results  - Monitor all insertion sites, i e  indwelling lines, tubes, and drains  - Monitor endotracheal if appropriate and nasal secretions for changes in amount and color  - [place] appropriate cooling/warming therapies per order  - Administer medications as ordered  - Instruct and encourage patient and family to use good hand hygiene technique  - Identify and instruct in appropriate isolation precautions for identified infection/condition  Outcome: Progressing  Goal: Absence of fever/infection during neutropenic period  Description: INTERVENTIONS:  - Monitor WBC    Outcome: Progressing     Problem: Knowledge Deficit  Goal: Patient/family/caregiver demonstrates understanding of disease process, treatment plan, medications, and discharge instructions  Description: Complete learning assessment and assess knowledge base    Interventions:  - Provide teaching at level of understanding  - Provide teaching via preferred learning methods  Outcome: Progressing     Problem: DISCHARGE PLANNING  Goal: Discharge to home or other facility with appropriate resources  Description: INTERVENTIONS:  - Identify barriers to discharge w/patient and caregiver  - Arrange for needed discharge resources and transportation as appropriate  - Identify discharge learning needs (meds, wound care, etc )  - Arrange for interpretive services to assist at discharge as needed  - Refer to Case Management Department for coordinating discharge planning if the patient needs post-hospital services based on physician/advanced practitioner order or complex needs related to functional status, cognitive ability, or social support system  Outcome: Progressing

## 2023-03-15 NOTE — TELEPHONE ENCOUNTER
Completed post angio/intervention callback to Mariia Jiang at primary contact number  The patient denies any pain, swelling, drainage, fevers at the puncture site  Confirmed understanding of post procedure medications, continue on  mg and Plavix 75mg daily until advised otherwise  Has a supply of medication  Is not currently experiencing any numbness, tingling, or weakness in her arm  Reports mild headaches  Advised that it is normal to experience fatigue and mild headaches for a few days after the procedure  Advised that tylenol should provide adequate relief  Explained that she should contact the office if she experiences any pain, swelling, or drainage from the site, and report to the ER or call 911 if she experiences WHOL, visual disturbance, confusion/disorientation, slurred speech, or ambulatory dysfunction  Reminded of post procedure follow-up scheduled on 3/27/2023  Patient appreciative of call

## 2023-03-15 NOTE — UTILIZATION REVIEW
NOTIFICATION OF ADMISSION DISCHARGE   This is a Notification of Discharge from [facility]  Please be advised that this patient has been discharge from our facility  Below you will find the admission and discharge date and time including the patient’s disposition  UTILIZATION REVIEW CONTACT:  Brielle Nicole  Utilization   Network Utilization Review Department  Phone: [phone] x carefully listen to the prompts  All voicemails are confidential   Email: [email or web address]     ADMISSION INFORMATION  PRESENTATION DATE: 3/13/2023 10:57 AM  OBERVATION ADMISSION DATE:   INPATIENT ADMISSION DATE: 3/13/23 10:57 AM   DISCHARGE DATE: 3/14/2023 11:25 AM   DISPOSITION:Home/Self Care    IMPORTANT INFORMATION:  Send all requests for admission clinical reviews, approved or denied determinations and any other requests to dedicated fax number below belonging to the campus where the patient is receiving treatment   List of dedicated fax numbers:  [address] DENIALS (Administrative/Medical Necessity) [phone]   [address] (Maternity/NICU/Pediatrics) [phone]   [facility] [phone]   [facility] [phone]   [address] [phone]   [address] [facility] [phone]   [address] [place] [address] [phone]   [address] [place] [address] 119 [phone]   [facility]  [phone]   [facility] [phone]   [address] [facility] [address] [facility] [phone]

## 2023-03-27 ENCOUNTER — OFFICE VISIT (OUTPATIENT)
Dept: NEUROSURGERY | Facility: CLINIC | Age: 58
End: 2023-03-27

## 2023-03-27 VITALS
HEART RATE: 89 BPM | WEIGHT: 175 LBS | HEIGHT: 63 IN | OXYGEN SATURATION: 98 % | SYSTOLIC BLOOD PRESSURE: 136 MMHG | TEMPERATURE: 98.2 F | DIASTOLIC BLOOD PRESSURE: 74 MMHG | BODY MASS INDEX: 31.01 KG/M2

## 2023-03-27 DIAGNOSIS — I67.1 CEREBRAL ANEURYSM: Primary | ICD-10-CM

## 2023-03-27 RX ORDER — ASCORBATE CALCIUM 500 MG
500 TABLET ORAL DAILY
COMMUNITY

## 2023-03-27 RX ORDER — DIPHENOXYLATE HYDROCHLORIDE AND ATROPINE SULFATE 2.5; .025 MG/1; MG/1
1 TABLET ORAL DAILY
COMMUNITY

## 2023-03-27 NOTE — PROGRESS NOTES
Patient Id: Castro Bowers is a 62 y o  female        Handedness: Right    Assessment/Plan:    Diagnoses and all orders for this visit:    Cerebral aneurysm  -     MRA head w wo contrast; Future    Other orders  -     multivitamin (THERAGRAN) TABS; Take 1 tablet by mouth daily  -     Calcium Ascorbate (VITAMIN C) 500 mg tablet; Take 500 mg by mouth daily  -     CALCIUM-MAGNESIUM-VITAMIN D PO; Take 2 caplets by mouth in the morning  -     B Complex Vitamins (B COMPLEX PO); Take 1 tablet by mouth in the morning        Discussion Summary:   1  Incidental 4x6mm ACoA aneurysm status post radial web embolization 3/13/2023  Procedure itself was uncomplicated  She has been doing well  She remains on dual antiplatelet therapy with aspirin 325 and Plavix 75  Initially my plan had been to discontinue her Plavix in a short interval, however, I am concerned of some extraluminal extension of the web device into her anterior communicating artery  As such I would like to continue her Plavix for the prescribed 3 months with full dose aspirin  At 3 months we will transition her to aspirin monotherapy and then at 6 months to baby aspirin  I will see her back in approximately 10 to 12 weeks with an MRA with and without contrast of her brain      2  Family history of aneurysm  Given the presence of an aneurysm in her mother and her I recommend that her siblings be screened with an MRA      3  Carotid stenosis regarding right carotid endarterectomy  CEA in October 2022  She is recovering well from this  Chief Complaint: Follow-up        HPI:   This is a very pleasant 49-year-old female who has been followed for her carotid stenosis for an extended period time   She was noted to have interval worsening stenosis and evaluated for carotid endarterectomy   As part of this evaluation a CT a of her neck was performed which demonstrated a 5 5 mm anterior communicating artery aneurysm      She is now status post web treatment of her anterior communicating artery aneurysm  I was able to do this transradial despite some difficulty and bruising at her last transradial procedure  She remains on dual antiplatelet therapy  She has had no significant complications  She does have some subjective complaints of feeling goofy while on Plavix      Her past medical history significant for hypertension, carotid stenosis   Her past surgical history significant for left leg surgery and right breast surgery      She is not allergic to any medications      Patient is  with no children , 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  She is a former smoker, denies any alcohol usage, a very long time ago did use drugs       Mother had brain aneurysm   Grandmother  suddenly of unknown cause      Review of systems obtained by the MA reviewed and updated below  Review of Systems   HENT: Negative for tinnitus  Eyes: Negative for visual disturbance  Respiratory: Negative for shortness of breath and wheezing  Cardiovascular: Negative for chest pain  Gastrointestinal: Negative  Genitourinary: Negative  Musculoskeletal: Positive for back pain and neck pain  Negative for gait problem  Neurological: Positive for numbness (when she wakes up hands are numb sometimes ) and headaches (slight headaches for the past couple of days , started after the procedure )  Negative for dizziness, tremors, seizures, weakness and light-headedness  Hematological: Bruises/bleeds easily (medication )  Physical Exam  Vitals:    23 1259   BP: 136/74   Pulse: 89   Temp: 98 2 °F (36 8 °C)   SpO2: 98%     She is well appearing  Affect is appropriate  Body mass index is 31 kg/m²  Tessy Lindo She is awake alert and oriented  Hearing and vision are grossly intact  Her pupils are equal round reactive to light  Her extraocular movements are intact  Her face is symmetric  Tongue is midline  Facial sensation is intact and symmetric throughout  Shoulder shrug is 5/5  There is no drift or dysmetria  She has full strength in her bilateral upper and lower extremities  She has normal muscle tone muscle bulk  Her biceps reflexes and patellar reflexes are 2+ and symmetric  Fermín sign negative bilaterally  Sensation intact to light touch and pinprick throughout  Her gait is normal      Her heart rate is regular  Normal respiratory effort  Abdomen nondistended  Radial pulses 2+       The following portions of the patient's history were reviewed and updated as appropriate: allergies, current medications, past family history, past medical history, past social history, past surgical history and problem list     Active Ambulatory Problems     Diagnosis Date Noted   • Carotid stenosis, right 07/24/2017   • HTN (hypertension) 10/23/2019   • Hypothyroid 10/23/2019   • Hyperlipidemia 10/23/2019   • Vitamin D deficiency 11/06/2019   • Obese 10/13/2022   • Myocardial infarction [facility])    • Cerebral aneurysm      Resolved Ambulatory Problems     Diagnosis Date Noted   • Aneurysm [facility])      Past Medical History:   Diagnosis Date   • Arthritis    • Breast cancer ([facility] [address] ) 2001   • Disease of thyroid gland    • Dizziness    • Hypertension    • Vitiligo        Past Surgical History:   Procedure Laterality Date   • BREAST CYST EXCISION Left 2001    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   • BREAST LUMPECTOMY Left 04/2001    DCIS   • BREAST SURGERY     • COLONOSCOPY     • HIP SURGERY Right     1966, 1975, 1976    • IR CEREBRAL ANGIOGRAPHY  12/2/2022   • CA TEAEC W/PATCH GRF CAROTID VERTB SUBCLAV NECK INC Right 10/13/2022    Procedure: ENDARTERECTOMY ARTERY CAROTID (Eversion) WITH BOVINE PATCH ANGIOPLASTY;  Surgeon: Roma John DO;  Location: [facility];  Service: Vascular         Current Outpatient Medications:   •  Deary Thyroid 120 MG tablet, Take 120 mg by mouth every other day, Disp: , Rfl:   •  ARMOUR THYROID 90 MG tablet, TAKE 1 TABLET DAILY (Patient taking differently: Take 90 mg by mouth every other day Takes 1/2 of pill), Disp: 30 tablet, Rfl: 0  •  aspirin (ECOTRIN) 325 mg EC tablet, Take 1 tablet (325 mg total) by mouth daily Begin one week prior to procedure, Disp: 30 tablet, Rfl: 0  •  B Complex Vitamins (B COMPLEX PO), Take 1 tablet by mouth in the morning, Disp: , Rfl:   •  Calcium Ascorbate (VITAMIN C) 500 mg tablet, Take 500 mg by mouth daily, Disp: , Rfl:   •  CALCIUM-MAGNESIUM-VITAMIN D PO, Take 2 caplets by mouth in the morning, Disp: , Rfl:   •  clopidogrel (PLAVIX) 75 mg tablet, Take 1 tablet (75 mg total) by mouth daily Begin one week prior to procedure, Disp: 60 tablet, Rfl: 0  •  hydrochlorothiazide (HYDRODIURIL) 25 mg tablet, Take 25 mg by mouth daily, Disp: , Rfl:   •  irbesartan (AVAPRO) 150 mg tablet, Take 150 mg by mouth every other day Alternating with 300mg dose, Disp: , Rfl:   •  irbesartan (AVAPRO) 300 mg tablet, Take 300 mg by mouth every other day, Disp: , Rfl:   •  multivitamin (THERAGRAN) TABS, Take 1 tablet by mouth daily, Disp: , Rfl:   •  rosuvastatin (CRESTOR) 5 mg tablet, 5 mg Mon wed fri, Disp: , Rfl:   •  amoxicillin (AMOXIL) 500 MG tablet, Take 500 mg by mouth 2 (two) times a day, Disp: , Rfl:     Results/Data: Imaging reviewed in detail with patient as well as reports

## 2023-05-01 DIAGNOSIS — I65.21 CAROTID STENOSIS, RIGHT: ICD-10-CM

## 2023-05-01 DIAGNOSIS — I67.1 CEREBRAL ANEURYSM: Primary | ICD-10-CM

## 2023-05-01 RX ORDER — CLOPIDOGREL BISULFATE 75 MG/1
75 TABLET ORAL DAILY
Qty: 45 TABLET | Refills: 0 | Status: SHIPPED | OUTPATIENT
Start: 2023-05-01

## 2023-05-01 NOTE — TELEPHONE ENCOUNTER
Received a call from [employer] reporting she will be out of her Plavix by the end of the week and requesting a refill  Noted at last OV with Dr Terry Poole she is to remain on DAPT for 3 months post coiling (this took place 3/13/2023) which will take her to approx 6/13  Placed order for 45 days to cover her until this date  Will be eRx to confirmed pharmacy

## 2023-05-22 ENCOUNTER — HOSPITAL ENCOUNTER (OUTPATIENT)
Dept: RADIOLOGY | Age: 58
Discharge: HOME | End: 2023-05-22

## 2023-05-22 DIAGNOSIS — I67.1 CEREBRAL ANEURYSM: ICD-10-CM

## 2023-05-22 RX ADMIN — GADOBUTROL 8 ML: 604.72 INJECTION INTRAVENOUS at 15:36

## 2023-05-26 ENCOUNTER — TELEPHONE (OUTPATIENT)
Dept: OBGYN CLINIC | Facility: CLINIC | Age: 58
End: 2023-05-26

## 2023-05-26 NOTE — TELEPHONE ENCOUNTER
Patient was unaware until recently that martell rivera was leaving  She will review our other providers and callback to schedule a yearly   She is requesting a mammogram slip once she schedules

## 2023-06-05 ENCOUNTER — OFFICE VISIT (OUTPATIENT)
Dept: NEUROSURGERY | Facility: CLINIC | Age: 58
End: 2023-06-05
Payer: COMMERCIAL

## 2023-06-05 VITALS
OXYGEN SATURATION: 99 % | BODY MASS INDEX: 32.07 KG/M2 | DIASTOLIC BLOOD PRESSURE: 82 MMHG | SYSTOLIC BLOOD PRESSURE: 138 MMHG | HEART RATE: 81 BPM | HEIGHT: 63 IN | WEIGHT: 181 LBS | TEMPERATURE: 98.3 F

## 2023-06-05 DIAGNOSIS — I67.1 CEREBRAL ANEURYSM: Primary | ICD-10-CM

## 2023-06-05 DIAGNOSIS — I65.21 CAROTID STENOSIS, RIGHT: ICD-10-CM

## 2023-06-05 DIAGNOSIS — I67.1 ANTERIOR COMMUNICATING ARTERY ANEURYSM: Primary | ICD-10-CM

## 2023-06-05 DIAGNOSIS — I67.1 CEREBRAL ANEURYSM: ICD-10-CM

## 2023-06-05 PROCEDURE — 99214 OFFICE O/P EST MOD 30 MIN: CPT | Performed by: NEUROLOGICAL SURGERY

## 2023-06-05 RX ORDER — SODIUM CHLORIDE 9 MG/ML
75 INJECTION, SOLUTION INTRAVENOUS CONTINUOUS
OUTPATIENT
Start: 2023-06-05

## 2023-06-05 NOTE — PROGRESS NOTES
"Patient Id: Jennifer Nunn is a 62 y o  female        Handedness: Right      Assessment/Plan:    Diagnoses and all orders for this visit:    Anterior communicating artery aneurysm  -     IR cerebral angiography; Future    Carotid stenosis, right  -     IR cerebral angiography; Future    Cerebral aneurysm  -     IR cerebral angiography; Future    Other orders  -     Diet NPO; Sips with meds; Standing  -     Nursing communication Apply gown prior to procedure; Standing  -     Have Patient Void On Call to Procedure Room; Standing  -     Insert and Maintain IV; Standing  -     sodium chloride 0 9 % infusion        Discussion and summary:   1  Incidental 4x6mm ACoA aneurysm status post radial web embolization 3/13/2023  Now 3 months status post web embolization  She remains on dual antiplatelet therapy  MRA without evidence of significant aneurysm filling  There is good filling of bilateral A1 and A2's  At this juncture I believe we can stop Plavix  We did discuss warning signs for ischemic changes for which she should alert us immediately  Otherwise I like her to continue her aspirin monotherapy and we will plan for a formal arteriogram 6 months after treatment, in September 2  Family history of aneurysm  Given the presence of an aneurysm in her mother and her I recommend that her siblings be screened with an MRA        Chief Complaint: Follow-up        HPI:   This is a very pleasant 55-year-old female who has been followed for her carotid stenosis for an extended period time  Minor Majors was noted to have interval worsening stenosis and evaluated for carotid endarterectomy   As part of this evaluation a CT a of her neck was performed which demonstrated a 5 5 mm anterior communicating artery aneurysm      She is now 3 months status post web treatment of her anterior communicating artery aneurysm without any significant complication    She remains on dual antiplatelet therapy and still complains of feeling \" " "goofy\" because of the Plavix  She has not had any other concerns  Her past medical history significant for hypertension, carotid stenosis   Her past surgical history significant for left leg surgery and right breast surgery      She is not allergic to any medications      Patient is  with no children , 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  She is a former smoker, denies any alcohol usage, a very long time ago did use drugs       Mother had brain aneurysm   Grandmother  suddenly of unknown cause  Review of systems obtained by the MA reviewed and updated below  Review of Systems   HENT: Negative  Negative for tinnitus  Eyes: Negative for visual disturbance  Respiratory: Negative for shortness of breath and wheezing  Cardiovascular: Negative for chest pain  Gastrointestinal: Negative  Endocrine: Negative  Genitourinary: Negative  Musculoskeletal: Positive for back pain and neck pain  Negative for gait problem  Allergic/Immunologic: Negative  Neurological: Negative for dizziness, tremors, seizures, weakness, light-headedness, numbness ( ) and headaches ( )  Hematological: Bruises/bleeds easily (medication )  Psychiatric/Behavioral: Negative  Physical Exam  Vitals:    23 1402   BP: 138/82   Pulse: 81   Temp: 98 3 °F (36 8 °C)   SpO2: 99%   She is well appearing  Affect is appropriate  Body mass index is 32 06 kg/m²  Mer[place] Goddard She is awake alert and oriented  Hearing and vision are grossly intact  Her pupils are equal round reactive to light  Her extraocular movements are intact  Her face is symmetric  Tongue is midline  Facial sensation is intact and symmetric throughout  Shoulder shrug is 5/5  There is no drift or dysmetria  She has full strength in her bilateral upper and lower extremities  She has normal muscle tone muscle bulk  Her biceps reflexes and patellar reflexes are 2+ and symmetric  Fermín sign negative bilaterally    " Sensation intact to light touch and pinprick throughout  Her gait is normal      Her heart rate is regular  Normal respiratory effort  Abdomen nondistended  Radial pulses 2+       The following portions of the patient's history were reviewed and updated as appropriate: allergies, current medications, past family history, past medical history, past social history, past surgical history and problem list     Active Ambulatory Problems     Diagnosis Date Noted   • Carotid stenosis, right 07/24/2017   • HTN (hypertension) 10/23/2019   • Hypothyroid 10/23/2019   • Hyperlipidemia 10/23/2019   • Vitamin D deficiency 11/06/2019   • Obese 10/13/2022   • Myocardial infarction [facility])    • Cerebral aneurysm      Resolved Ambulatory Problems     Diagnosis Date Noted   • Aneurysm [facility])      Past Medical History:   Diagnosis Date   • Arthritis    • Breast cancer ([address] ) 2001   • Disease of thyroid gland    • Dizziness    • Hypertension    • Vitiligo        Past Surgical History:   Procedure Laterality Date   • BREAST CYST EXCISION Left 2001    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   • BREAST LUMPECTOMY Left 04/2001    DCIS   • BREAST SURGERY     • COLONOSCOPY     • HIP SURGERY Right     1966, 1975, 1976    • IR CEREBRAL ANGIOGRAPHY  12/2/2022   • IL TEAEC W/PATCH GRF CAROTID VERTB SUBCLAV NECK INC Right 10/13/2022    Procedure: ENDARTERECTOMY ARTERY CAROTID (Eversion) WITH BOVINE PATCH ANGIOPLASTY;  Surgeon: Rosa M Swain DO;  Location: [facility];  Service: Vascular         Current Outpatient Medications:   •  amoxicillin (AMOXIL) 500 MG tablet, Take 500 mg by mouth 2 (two) times a day, Disp: , Rfl:   •  [place] Thyroid 120 MG tablet, Take 120 mg by mouth every other day, Disp: , Rfl:   •  ARMOUR THYROID 90 MG tablet, TAKE 1 TABLET DAILY (Patient taking differently: Take 90 mg by mouth every other day Takes 1/2 of pill), Disp: 30 tablet, Rfl: 0  •  aspirin (ECOTRIN) 325 mg EC tablet, Take 1 tablet (325 mg total) by mouth daily Begin one week prior to procedure, Disp: 30 tablet, Rfl: 0  •  B Complex Vitamins (B COMPLEX PO), Take 1 tablet by mouth in the morning, Disp: , Rfl:   •  Calcium Ascorbate (VITAMIN C) 500 mg tablet, Take 500 mg by mouth daily, Disp: , Rfl:   •  CALCIUM-MAGNESIUM-VITAMIN D PO, Take 2 caplets by mouth in the morning, Disp: , Rfl:   •  hydrochlorothiazide (HYDRODIURIL) 25 mg tablet, Take 25 mg by mouth daily, Disp: , Rfl:   •  irbesartan (AVAPRO) 150 mg tablet, Take 150 mg by mouth every other day Alternating with 300mg dose, Disp: , Rfl:   •  irbesartan (AVAPRO) 300 mg tablet, Take 300 mg by mouth every other day, Disp: , Rfl:   •  multivitamin (THERAGRAN) TABS, Take 1 tablet by mouth daily, Disp: , Rfl:   •  rosuvastatin (CRESTOR) 5 mg tablet, 5 mg Mon wed fri, Disp: , Rfl:     Results/Data: Imaging reviewed in detail with patient as well as reports

## 2023-07-28 ENCOUNTER — HOSPITAL ENCOUNTER (OUTPATIENT)
Dept: RADIOLOGY | Facility: MEDICAL CENTER | Age: 58
Discharge: HOME | End: 2023-07-28
Payer: COMMERCIAL

## 2023-07-28 DIAGNOSIS — Z98.890 HISTORY OF CAROTID ENDARTERECTOMY: ICD-10-CM

## 2023-07-28 PROCEDURE — 93880 EXTRACRANIAL BILAT STUDY: CPT

## 2023-07-28 PROCEDURE — 93880 EXTRACRANIAL BILAT STUDY: CPT | Performed by: SURGERY

## 2023-08-02 ENCOUNTER — OFFICE VISIT (OUTPATIENT)
Dept: VASCULAR SURGERY | Facility: CLINIC | Age: 58
End: 2023-08-02
Payer: COMMERCIAL

## 2023-08-02 ENCOUNTER — TELEPHONE (OUTPATIENT)
Dept: VASCULAR SURGERY | Facility: CLINIC | Age: 58
End: 2023-08-02

## 2023-08-02 VITALS
HEIGHT: 63 IN | SYSTOLIC BLOOD PRESSURE: 132 MMHG | OXYGEN SATURATION: 99 % | BODY MASS INDEX: 32.07 KG/M2 | DIASTOLIC BLOOD PRESSURE: 70 MMHG | WEIGHT: 181 LBS | HEART RATE: 76 BPM

## 2023-08-02 DIAGNOSIS — I65.21 CAROTID STENOSIS, RIGHT: ICD-10-CM

## 2023-08-02 DIAGNOSIS — Z98.890 HISTORY OF CEA (CAROTID ENDARTERECTOMY): Primary | ICD-10-CM

## 2023-08-02 PROCEDURE — 99214 OFFICE O/P EST MOD 30 MIN: CPT | Performed by: SURGERY

## 2023-08-02 NOTE — ASSESSMENT & PLAN NOTE
Hx R CEA in 2022 for asymptomatic disease  Doing well  Returns to office to review surveillance duplex  Endart site widely patent  L ICA <50%    Continue current medical regimen  Continue surveillance

## 2023-08-02 NOTE — PROGRESS NOTES
Assessment/Plan:    Carotid stenosis, right  Hx R CEA in 2022 for asymptomatic disease  Doing well  Returns to office to review surveillance duplex  Endart site widely patent  L ICA <50%    Continue current medical regimen  Continue surveillance       Diagnoses and all orders for this visit:    History of CEA (carotid endarterectomy)  -     VAS carotid complete study; Future    Carotid stenosis, right          Subjective:      Patient ID: Carlos Yu is a 62 y.o. female. Patient is s/p R CEA done 10/13/2022. She presents for review of CV done 07/28/2023. She denies TIA/CVA symptoms. She is taking  mg. She is not taking Rosuvastatin due to side effect of cramping. HPI    The following portions of the patient's history were reviewed and updated as appropriate: allergies, current medications, past family history, past medical history, past social history, past surgical history and problem list.    Review of Systems   Constitutional: Negative. HENT: Negative. Eyes: Negative. Negative for visual disturbance. Respiratory: Negative. Cardiovascular: Negative. Gastrointestinal: Negative. Endocrine: Negative. Genitourinary: Negative. Musculoskeletal: Negative. Skin: Negative. Allergic/Immunologic: Negative. Neurological: Negative. Negative for dizziness, facial asymmetry, speech difficulty, weakness and headaches. Hematological: Negative. Psychiatric/Behavioral: Negative. Objective:      /70 (BP Location: Left arm, Patient Position: Sitting, Cuff Size: Standard)   Pulse 76   Ht 5' 3" (1.6 m)   Wt 82.1 kg (181 lb)   SpO2 99%   BMI 32.06 kg/m²          Physical Exam  Constitutional:       General: She is not in acute distress. Appearance: She is well-developed. HENT:      Head: Normocephalic and atraumatic. Eyes:      General: No scleral icterus. Conjunctiva/sclera: Conjunctivae normal.   Neck:      Trachea: No tracheal deviation. Cardiovascular:      Rate and Rhythm: Normal rate. Pulmonary:      Effort: Pulmonary effort is normal.   Abdominal:      General: There is no distension. Palpations: Abdomen is soft. There is no mass (no appreciable aortic pulsation/aneurysm). Tenderness: There is no abdominal tenderness. There is no guarding or rebound. Musculoskeletal:         General: Normal range of motion. Cervical back: Normal range of motion and neck supple. Skin:     General: Skin is warm and dry. Neurological:      General: No focal deficit present. Mental Status: She is alert and oriented to person, place, and time. Cranial Nerves: No cranial nerve deficit. Motor: No weakness. Gait: Gait normal.   Psychiatric:         Mood and Affect: Mood normal.         Behavior: Behavior normal.         Thought Content: Thought content normal.         Judgment: Judgment normal.       VQI CEA Long-Term Follow-Up    Myocardial Infarction - No    Neurological Event - No    Reperfusion Symptoms - No    Modified Alexander Score - 0 - No symptoms         Functional Status - Full           Carotid duplex:  CONCLUSION:     Impression  RIGHT:  Widely patent internal carotid artery and endarterectomy site. Vertebral artery flow is antegrade. There is no significant subclavian artery  disease. LEFT:  There is <50% stenosis noted in the internal carotid artery. Plaque is  heterogenousand irregular. Vertebral artery flow is antegrade. There is no significant subclavian artery  disease.

## 2023-08-24 LAB — HBA1C MFR BLD HPLC: 6 %

## 2023-08-30 ENCOUNTER — TELEPHONE (OUTPATIENT)
Dept: RADIOLOGY | Facility: HOSPITAL | Age: 58
End: 2023-08-30

## 2023-08-31 ENCOUNTER — TELEPHONE (OUTPATIENT)
Dept: RADIOLOGY | Facility: HOSPITAL | Age: 58
End: 2023-08-31

## 2023-08-31 NOTE — PRE-PROCEDURE INSTRUCTIONS
Pre-procedure Instructions for Interventional Radiology  [address] [id number] [place]  [phone]    You are scheduled for a/an Cerebral Angiogram.    On Friday 9/8/23. Your tentative arrival time is 0700. Short stay will notify you the day before your procedure with the exact arrival time and the location to arrive. To prepare for your procedure:  1. Please arrange for someone to drive you home after the procedure and stay with you until the next morning if you are instructed to do so. This is typically for patients receiving some type of sedative or anesthetic for the procedure. 2. DO NOT EAT OR DRINK ANYTHING after midnight on the evening before your procedure including candy & gum.  3. ONLY SIPS OF WATER with your medications are allowed on the morning of your procedure. 4. TAKE ALL OF YOUR REGULAR MEDICATIONS THE MORNING OF YOUR PROCEDURE with sips of water! We may call you to stop some of your blood sugar, blood pressure and blood thinning medications depending on the procedure. Please take all of these medications unless we instruct you to stop them. 5. If you have an allergy to x-ray dye, please contact Interventional Radiology for an x-ray dye preparation which usually consists of an oral steroid and Benadryl. The day of your procedure:  1. Bring a list of the medications you take at home. 2. Bring medications you take for breathing problems (such as inhalers), medications for chest pain, or both. 3. Bring a case for your glasses or contacts. 4. Bring your insurance card and a form of photo ID.  5. Please leave all valuables such as credit cards and jewelry at home. 6. Report to the registration desk in the main lobby at the [facility], Entrance B. Ask to be directed to [facility]. 7. While your procedure is being performed, your family may wait in the Radiology Waiting Room on the 1st floor in Radiology.   if they need to leave, they may provide a number to be called following the procedure. 8. Be prepared to stay overnight just in case. Sometimes procedures will indicate the need for further observation or treatment. 9. If you are scheduled for a follow-up visit with the Interventional Radiologist after your procedure, you will be called with a date and time.     Special Instructions (Medications to stop taking before your procedure etc.):

## 2023-09-08 ENCOUNTER — ANESTHESIA (OUTPATIENT)
Dept: RADIOLOGY | Facility: HOSPITAL | Age: 58
End: 2023-09-08

## 2023-09-08 ENCOUNTER — ANESTHESIA EVENT (OUTPATIENT)
Dept: RADIOLOGY | Facility: HOSPITAL | Age: 58
End: 2023-09-08

## 2023-09-08 ENCOUNTER — HOSPITAL ENCOUNTER (OUTPATIENT)
Dept: RADIOLOGY | Facility: HOSPITAL | Age: 58
Discharge: HOME | End: 2023-09-08
Attending: NEUROLOGICAL SURGERY
Payer: COMMERCIAL

## 2023-09-08 VITALS
RESPIRATION RATE: 16 BRPM | SYSTOLIC BLOOD PRESSURE: 119 MMHG | WEIGHT: 177 LBS | DIASTOLIC BLOOD PRESSURE: 71 MMHG | HEIGHT: 63 IN | OXYGEN SATURATION: 98 % | HEART RATE: 69 BPM | BODY MASS INDEX: 31.36 KG/M2 | TEMPERATURE: 97.7 F

## 2023-09-08 DIAGNOSIS — I65.21 CAROTID STENOSIS, RIGHT: ICD-10-CM

## 2023-09-08 DIAGNOSIS — I67.1 ANTERIOR COMMUNICATING ARTERY ANEURYSM: ICD-10-CM

## 2023-09-08 DIAGNOSIS — I67.1 CEREBRAL ANEURYSM: ICD-10-CM

## 2023-09-08 PROCEDURE — C1894 INTRO/SHEATH, NON-LASER: HCPCS

## 2023-09-08 PROCEDURE — 36224 PLACE CATH CAROTD ART: CPT | Performed by: NEUROLOGICAL SURGERY

## 2023-09-08 PROCEDURE — 76937 US GUIDE VASCULAR ACCESS: CPT | Performed by: NEUROLOGICAL SURGERY

## 2023-09-08 PROCEDURE — 76377 3D RENDER W/INTRP POSTPROCES: CPT | Performed by: NEUROLOGICAL SURGERY

## 2023-09-08 PROCEDURE — 76937 US GUIDE VASCULAR ACCESS: CPT

## 2023-09-08 PROCEDURE — C1887 CATHETER, GUIDING: HCPCS

## 2023-09-08 PROCEDURE — C1769 GUIDE WIRE: HCPCS

## 2023-09-08 PROCEDURE — 36226 PLACE CATH VERTEBRAL ART: CPT | Performed by: NEUROLOGICAL SURGERY

## 2023-09-08 PROCEDURE — 36226 PLACE CATH VERTEBRAL ART: CPT

## 2023-09-08 PROCEDURE — 36224 PLACE CATH CAROTD ART: CPT

## 2023-09-08 RX ORDER — SODIUM CHLORIDE 9 MG/ML
75 INJECTION, SOLUTION INTRAVENOUS CONTINUOUS
Status: DISCONTINUED | OUTPATIENT
Start: 2023-09-08 | End: 2023-09-09 | Stop reason: HOSPADM

## 2023-09-08 RX ORDER — PROPOFOL 10 MG/ML
INJECTION, EMULSION INTRAVENOUS CONTINUOUS PRN
Status: DISCONTINUED | OUTPATIENT
Start: 2023-09-08 | End: 2023-09-08

## 2023-09-08 RX ORDER — VERAPAMIL HYDROCHLORIDE 2.5 MG/ML
INJECTION, SOLUTION INTRAVENOUS AS NEEDED
Status: COMPLETED | OUTPATIENT
Start: 2023-09-08 | End: 2023-09-08

## 2023-09-08 RX ORDER — SODIUM CHLORIDE 9 MG/ML
75 INJECTION, SOLUTION INTRAVENOUS CONTINUOUS
Status: CANCELLED | OUTPATIENT
Start: 2023-09-08

## 2023-09-08 RX ORDER — IODIXANOL 320 MG/ML
200 INJECTION, SOLUTION INTRAVASCULAR
Status: COMPLETED | OUTPATIENT
Start: 2023-09-08 | End: 2023-09-08

## 2023-09-08 RX ORDER — HEPARIN SODIUM 1000 [USP'U]/ML
INJECTION, SOLUTION INTRAVENOUS; SUBCUTANEOUS AS NEEDED
Status: COMPLETED | OUTPATIENT
Start: 2023-09-08 | End: 2023-09-08

## 2023-09-08 RX ORDER — LIDOCAINE HYDROCHLORIDE 10 MG/ML
INJECTION, SOLUTION EPIDURAL; INFILTRATION; INTRACAUDAL; PERINEURAL AS NEEDED
Status: COMPLETED | OUTPATIENT
Start: 2023-09-08 | End: 2023-09-08

## 2023-09-08 RX ORDER — NITROGLYCERIN 20 MG/100ML
INJECTION INTRAVENOUS AS NEEDED
Status: COMPLETED | OUTPATIENT
Start: 2023-09-08 | End: 2023-09-08

## 2023-09-08 RX ORDER — FENTANYL CITRATE 50 UG/ML
INJECTION, SOLUTION INTRAMUSCULAR; INTRAVENOUS AS NEEDED
Status: DISCONTINUED | OUTPATIENT
Start: 2023-09-08 | End: 2023-09-08

## 2023-09-08 RX ORDER — LIDOCAINE HYDROCHLORIDE 10 MG/ML
INJECTION, SOLUTION EPIDURAL; INFILTRATION; INTRACAUDAL; PERINEURAL AS NEEDED
Status: DISCONTINUED | OUTPATIENT
Start: 2023-09-08 | End: 2023-09-08

## 2023-09-08 RX ADMIN — IODIXANOL 150 ML: 320 INJECTION, SOLUTION INTRAVASCULAR at 10:27

## 2023-09-08 RX ADMIN — PROPOFOL 10 MG: 10 INJECTION, EMULSION INTRAVENOUS at 08:17

## 2023-09-08 RX ADMIN — FENTANYL CITRATE 25 MCG: 50 INJECTION, SOLUTION INTRAMUSCULAR; INTRAVENOUS at 08:15

## 2023-09-08 RX ADMIN — LIDOCAINE HYDROCHLORIDE 1 ML: 10 INJECTION, SOLUTION EPIDURAL; INFILTRATION; INTRACAUDAL; PERINEURAL at 08:21

## 2023-09-08 RX ADMIN — HEPARIN SODIUM 4000 UNITS: 1000 INJECTION INTRAVENOUS; SUBCUTANEOUS at 08:21

## 2023-09-08 RX ADMIN — LIDOCAINE HYDROCHLORIDE 50 MG: 10 INJECTION, SOLUTION EPIDURAL; INFILTRATION; INTRACAUDAL; PERINEURAL at 08:06

## 2023-09-08 RX ADMIN — PROPOFOL 10 MG: 10 INJECTION, EMULSION INTRAVENOUS at 08:11

## 2023-09-08 RX ADMIN — PROPOFOL 60 MCG/KG/MIN: 10 INJECTION, EMULSION INTRAVENOUS at 08:06

## 2023-09-08 RX ADMIN — VERAPAMIL HYDROCHLORIDE 5 MG: 2.5 INJECTION INTRAVENOUS at 08:21

## 2023-09-08 RX ADMIN — SODIUM CHLORIDE 75 ML/HR: 0.9 INJECTION, SOLUTION INTRAVENOUS at 07:09

## 2023-09-08 RX ADMIN — NITROGLYCERIN 400 MCG: 20 INJECTION INTRAVENOUS at 08:21

## 2023-09-08 NOTE — ANESTHESIA PREPROCEDURE EVALUATION
Procedure:  IR CEREBRAL ANGIOGRAPHY    Relevant Problems   CARDIO   (+) HTN (hypertension)   (+) Hyperlipidemia   (+) Myocardial infarction (HCC)      ENDO   (+) Hypothyroid        Physical Exam    Airway    Mallampati score: III  TM Distance: <3 FB  Neck ROM: full     Dental   No notable dental hx     Cardiovascular  Cardiovascular exam normal    Pulmonary  Pulmonary exam normal     Other Findings        Anesthesia Plan  ASA Score- 3     Anesthesia Type- IV sedation with anesthesia with ASA Monitors. Additional Monitors:   Airway Plan:     Comment: Prev anesthetics without issue. ? Difficult intubation? Sparkle Bulls if needed. No current cardiac or respiratory issues. Normal ECHO. Plan Factors-Exercise tolerance (METS): >4 METS. Chart reviewed. EKG reviewed. Imaging results reviewed. Existing labs reviewed. Patient summary reviewed. Patient is not a current smoker. Induction- intravenous. Postoperative Plan-     Informed Consent- Anesthetic plan and risks discussed with patient. I personally reviewed this patient with the CRNA. Discussed and agreed on the Anesthesia Plan with the CRNA. Srikanth Mullins

## 2023-09-08 NOTE — DISCHARGE INSTRUCTIONS
Today, you underwent a diagnostic cerebral angiogram under the care of Dr. Fidel Tovar for evaluation of acoa web. ? The following instructions will help you care for yourself, or be cared for upon your return home today. These are guidelines for your care right after your surgery only. ? Notify Your Doctor or Nurse if you have any of the following:  ? SYMPTOMS OF WOUND INFECTION--   Increased pain in or around the incision   Swelling around the incision  Any drainage from the incision  Incision separates or opens up  Warmth in the tissues around the incision  Redness or tenderness on the skin near the incision   Fever (temperature greater than 101 degrees F)   ? NEUROLOGICAL CHANGES--  Change in alertness  Increased sleepiness   Nausea and vomiting   New onset of numbness or weakness in arms or legs   New problems with your bowels or bladder  New or worse problems with balance or walking  Seizures, new or worsening  ? UNRELIEVED HEADACHE PAIN--  New or increased pain unrelieved with pain medications   Pain associated with nausea and vomiting   Pain associated with other symptoms  ? QUESTIONS OR PROBLEMS--  Any questions or problems that you are unsure about  Wound Care:  Keep Incision Clean and Dry   You may shower daily, but do not soak incision. Pat dry after showering. No tub baths, soaking, swimming for 1 week after angiogram.   You do not need to cover the incision. Mild to moderate bruising and tenderness to the site is expected and may last up to 1-2 weeks after your procedure. ?  A closure device was placed at the catheter insertion site. This is MRI compatible. Remove the dressing 24 hours after your procedure. If your groin site is bleeding, apply firm pressure for 10 minutes. Reinforce dressing rather than removing and checking frequently. If continues to bleed through the dressing after 1 hour, contact your neurosurgeon's office. Anticipatory Education:  ?   PAIN MED W/ Acetaminophen (Tylenol)  --IF a prescription for pain medicine has been sent home with you:  --Narcotic pain medication may cause constipation. Be sure to take stool softeners or laxatives while you are on narcotic pain medication. --Do not drive after taking prescription pain medicine. ?  If this medicine is too strong, or no longer necessary, or we did NOT recommend/prescribe oral narcotics, you may take:   - Tylenol Extra-strength/Acetaminophen, 2 tablets every 4-6 hours as needed for mild pain. DO NOT TAKE MORE THAN 4000MG PER DAY from combined sources. NOTE: Remember to eat when taking pain medicines in order to avoid nausea. Watch for constipation. Eat plenty of fruits, vegetables, juices, and drink 6-8 glasses of water each day. Constipation: Stay active and drink at least 6-8 cups of fluid each day to prevent constipation. If you need a laxative or stool softener follow the package directions or consult with your local pharmacists if you have questions. ? After anesthesia, rest for 24 hours. Do not drive, drink alcohol beverages or make any important decisions during this time. General anesthesia may cause sore throat, jaw discomfort or muscle aches. These symptoms can last for one or two days. Activity: Please follow these instructions:  Advance your activity as you can tolerate. You may do light house work; nothing strenuous   You may walk all you want. You may go up and down the steps. Use the railing for support  Do not do excessive bending, straining or heavy lifting for 48 hours after your procedure  Do not drive or return to work until you are instructed   It is normal for your energy level and sleep patterns to change after surgery. Get extra sleep at night and take naps during the day to help you feel less tired. Take rest periods during the day. Complete recovery may take several weeks. ?  You may resume driving after 74-40 hours recovery. You may return to work after 48 hours of recovery. ?  Diet:  Your doctor has recommended that you follow these diet instructions at home. Refer to the patient education materials you received during your hospital stay. If you would like more nutrition counseling, ask your doctor about making an appointment with an outpatient dietitian. Resume your home diet  ? Medications:  Please resume your home medications as instructed. ? Home Supplies and Equipment:  none  Additional Contacts:  ? CONTACTS FOR NEUROSURGERY: You may call your neurosurgeon’s office if you have questions between 8:30 am and 4:30 pm. You may request to speak to the nurse practitioner who is available Monday through Friday. ?  For off hours or the weekend you may call your neurosurgeon's office to leave a message. Moderate Sedation   WHAT YOU NEED TO KNOW:   Moderate sedation, or conscious sedation, is medicine used during procedures to help you feel relaxed and calm. You will be awake and able to follow directions without anxiety or pain. You will remember little to none of the procedure. You may feel tired, weak, or unsteady on your feet after you get sedation. You may also have trouble concentrating or short-term memory loss. These symptoms should go away in 24 hours or less. DISCHARGE INSTRUCTIONS:   Call 911 or have someone else call for any of the following: You have sudden trouble breathing. You cannot be woken. Seek care immediately if:   You have a severe headache or dizziness. Your heart is beating faster than usual.  Contact your healthcare provider if:   You have a fever. You have nausea or are vomiting for more than 8 hours after the procedure. Your skin is itchy, swollen, or you have a rash. You have questions or concerns about your condition or care. Self-care:   Have someone stay with you for 24 hours. This person can drive you to errands and help you do things around the house. This person can also watch for problems.       Rest and do quiet activities for 24 hours. Do not exercise, ride a bike, or play sports. Stand up slowly to prevent dizziness and falls. Take short walks around the house with another person. Slowly return to your usual activities the next day. Do not drive or use dangerous machines or tools for 24 hours. You may injure yourself or others. Examples include a lawnmower, saw, or drill. Do not return to work for 24 hours if you use dangerous machines or tools for work. Do not make important decisions for 24 hours. For example, do not sign important papers or invest money. Drink liquids as directed. Liquids help flush the sedation medicine out of your body. Ask how much liquid to drink each day and which liquids are best for you. Eat small, frequent meals to prevent nausea and vomiting. Start with clear liquids such as juice or broth. If you do not vomit after clear liquids, you can eat your usual foods. Do not drink alcohol or take medicines that make you drowsy. This includes medicines that help you sleep and anxiety medicines. Ask your healthcare provider if it is safe for you to take pain medicine. Follow up with your healthcare provider as directed: Write down your questions so you remember to ask them during your visits. © 2017 5 [facility] Information is for End User's use only and may not be sold, redistributed or otherwise used for commercial purposes. All illustrations and images included in CareNotes® are the copyrighted property of [employer] or Alfie Sandra. The above information is an  only. It is not intended as medical advice for individual conditions or treatments. Talk to your doctor, nurse or pharmacist before following any medical regimen to see if it is safe and effective for you.

## 2023-09-08 NOTE — OP NOTE
OPERATIVE REPORT  PATIENT NAME: Suzanne Friedman     :  1965  MRN: [id number]   Pt Location: Interventional radiology    SURGERY DATE: 23     Preop Diagnosis:  1. Prior anterior communicating artery aneurysm treatment    Postop Diagnosis  1. Prior anterior communicating artery aneurysm treatment    Procedure:  Use of ultrasound  Right radial arteriogram   Right Common Carotid Arteriogram  Right Internal Carotid Arteriogram  3D rotational arteriogram right internal carotid artery with postprocessed images reviewed at a separate workstation  Left Common Carotid Arteriogram  Left Internal Carotid Arteriogram  Right Vertebral Artery Arteriogram    Surgeon:   Alondra Munoz MD    Specimen(s):  None    Estimated Blood Loss:   None    Drains:  None    Anesthesia Type:   Monitored Anesthesia Care     Complications:  None    Operative Indications:  Suzanne Friedman  is a very pleasant 62 y.o. female who underwent previous web embolization of an anterior communicating artery aneurysm. After discussing the risks and benefits of a diagnostic cerebral arteriogram including bleeding, stroke, groin hematoma, and death the patient elected to proceed. Procedure Details:  After obtaining written informed consent, the patient was brought into the operating room and moved to the OR table in supine fashion. The right radial artery was prepped and draped in the usual sterile fashion. Monitored anesthesia care was induced. A surgical time-out was performed. 3 cc mixture of lidocaine and 400 mcg of nitroglycerin was injected immediately above the right radial artery. Ultrasound guidance under real-time visualization was used to guide the micro puncture needle into the right radial artery. Next, a 5 [place] tapered sheath was then placed. Next and infusion of 300 mcg of nitroglycerin, 4000 units heparin, and 5 mg of verapamil were slowly injected intra-arterially through the right radial sheath.   Radial artery arteriogram then performed. Next a 5 [place] Alvarado glide catheter was advanced and reshaped. The catheter was then withdrawn into the aortic arch and advanced into the left common carotid artery. AP lateral and oblique images of the left cervical carotid were obtained. The catheter was then advanced and the left internal carotid artery was then catheterized. AP lateral and magnified oblique images of the left intracranial carotid circulation were obtained. The right common carotid artery was then catheterized. AP lateral and oblique images of the right cervical carotid were obtained. The catheter was then advanced and the right internal carotid artery was then catheterized. AP lateral and magnified oblique images of the right intracranial carotid circulation were obtained. A 3D rotational arteriogram of the right internal carotid artery was then performed postprocessed images reviewed at a separate workstation    Right vertebral artery was catheterized. Trans facial and lateral and oblique images of the right vertebral artery circulation were then obtained. The catheter was then withdrawn from the body and a TR compression band was placed. There was appropriate hemostasis. The patient was then awoken from monitored anesthesia care and found to be in baseline neurologic condition. All sponge and needle counts were correct. INTERPRETATION OF ANGIOGRAPHIC FINDINGS:   1. The Left common carotid circulation reveals antegrade flow into the internal and external carotid arteries. There is no hemodynamically significant carotid stenosis as defined by NASCET criteria. 2. The Left internal carotid artery circulation reveals antegrade flow into the middle cerebral and anterior cerebral arteries. There is a patent anterior communicating artery. There is a patent posterior communicating artery. There is no residual aneurysm filling and there is flash filling into the contralateral A2. There is no evidence of aneurysm, AVM, or vascular malformation. The capillary and venous phases are unremarkable. 3. The Right common carotid circulation reveals antegrade flow into the internal and external carotid arteries. There is no hemodynamically significant carotid stenosis as defined by NASCET criteria. 4. The Right internal carotid artery circulation reveals antegrade flow into the middle cerebral and anterior cerebral arteries. There is a patent anterior communicating artery. There is a patent posterior communicating artery. The white embolization device appears to be in place. There is no filling of the dome of the aneurysm. There is a small area of the neck which has sub-2 mm filling. There also appears to be some stenosis of the proximal A2. This does not appear to be flow-limiting. There is no evidence of aneurysm, AVM, or vascular malformation. The capillary and venous phases are unremarkable. 5.  3D rotational arteriogram the right internal carotid artery better demonstrates minimal residual neck remnant consistent with web embolization as well as less than 50% stenosis of the proximal A2.    6. The Right vertebral intracranial circulation reveals retrograde reflux down the contralateral vertebral artery. Both PICAs are well visualized. Antegrade flow is present intoall the posterior circulation branches. There are no AVMs or aneurysms. The capillary and venous phases are unremarkable. Impression:  1. No residual significant aneurysm, this is consistent with Kimmie Jason 2 embolization for a web embolization device.  WEB grade AB  2. <50% stentosis of proximal right A2     Patient Disposition:  APU    SIGNATURE: Narda Quintanilla MD  DATE: 09/08/23   TIME: 8:55 AM

## 2023-09-08 NOTE — H&P
Patient seen and examined independently. Clinic note from 6/5/23 remains current. Patient is not on any new medications and has not had any new medical problems. Patient remains on CUX188. /83 (BP Location: Right arm)   Pulse 75   Temp 97.9 °F (36.6 °C) (Temporal)   Resp 16   Ht 5' 3" (1.6 m)   Wt 80.3 kg (177 lb)   SpO2 98%   BMI 31.35 kg/m²  On exam, patient is neurologically intact. Heart rate is regular. Breath sounds are clear.   Plan to proceed with diagnostic cerebral arteriogram to better delineate aneurysm treatment

## 2023-09-08 NOTE — SEDATION DOCUMENTATION
Diagnostic cerebral angiogram performed by Dr. Beth Lynn. Anesthesia present for case. TR band in place on right wrist with 17 mL of air at 0847. Report called to [facility]. .IR Procedure Bedrest Start Time is 9163.

## 2023-09-08 NOTE — ANESTHESIA POSTPROCEDURE EVALUATION
Post-Op Assessment Note    CV Status:  Stable  Pain Score: 0    Pain management: adequate     Mental Status:  Alert and awake   Hydration Status:  Euvolemic   PONV Controlled:  Controlled   Airway Patency:  Patent      Post Op Vitals Reviewed: Yes      Staff: CRNA         No notable events documented.     BP   106/62   Temp      Pulse 72   Resp 14   SpO2 100

## 2023-09-12 ENCOUNTER — TELEPHONE (OUTPATIENT)
Dept: NEUROSURGERY | Facility: CLINIC | Age: 58
End: 2023-09-12

## 2023-09-12 NOTE — TELEPHONE ENCOUNTER
Completed post angio callback to [place] at primary contact number. The patient denies any pain, swelling, drainage, fevers at the puncture site. Is not currently experiencing any numbness, tingling, or weakness in her arm. Reports mild headaches initially but resolved quickly. Advised that it is normal to experience fatigue and mild headaches for a few days after the procedure. Advised that tylenol should provide adequate relief. Explained that she should contact the office if she experiences any pain, swelling, or drainage from the site, and report to the ER or call 911 if she experiences [facility] KEMAL, visual disturbance, of confusion/disorientation, slurred speech, ambulatory dysfunction. Reminded of post procedure follow-up scheduled on 9/27/2023. Patient appreciative of call.

## 2023-09-22 NOTE — TELEPHONE ENCOUNTER
----- Message from Alvin Reaves MD sent at 9/22/2023 10:01 AM CDT -----  Contact: Ms. Verma [phone]  I've filled out all paperwork that I've received for Owen. Please see Ms. Cheng and she can check on this, but I don't have any currently in my inbox or outbox  ----- Message -----  From: Mara Penaloza RN  Sent: 9/22/2023   9:18 AM CDT  To: Alvin Reaves MD      ----- Message -----  From: Sepideh Espinosa  Sent: 9/22/2023   9:14 AM CDT  To: [facility] Clinical Support    [email or web address]     Patient is calling for Medical Advice regarding:    How long has patient had these symptoms:    Pharmacy name and phone#:    Would like response via [facility]:      Comments:Calling from [facility] about a plan of care for the pt . It was faxed to the office 9/14-9/19 and 9/21 Please call Ms Verma    Spoke with Luci from [facility], patient needs well visit to have forms completed. Well visit is scheduled for 10/17/23.            Labs already ordered

## 2023-09-27 ENCOUNTER — OFFICE VISIT (OUTPATIENT)
Dept: NEUROSURGERY | Facility: CLINIC | Age: 58
End: 2023-09-27
Payer: COMMERCIAL

## 2023-09-27 VITALS
BODY MASS INDEX: 31.36 KG/M2 | HEART RATE: 74 BPM | HEIGHT: 63 IN | DIASTOLIC BLOOD PRESSURE: 64 MMHG | SYSTOLIC BLOOD PRESSURE: 126 MMHG | TEMPERATURE: 98.6 F | OXYGEN SATURATION: 99 % | WEIGHT: 177 LBS

## 2023-09-27 DIAGNOSIS — I67.1 CEREBRAL ANEURYSM: Primary | ICD-10-CM

## 2023-09-27 PROCEDURE — 99214 OFFICE O/P EST MOD 30 MIN: CPT | Performed by: NEUROLOGICAL SURGERY

## 2023-10-28 ENCOUNTER — HOSPITAL ENCOUNTER (EMERGENCY)
Facility: HOSPITAL | Age: 58
Discharge: HOME | End: 2023-10-28
Attending: EMERGENCY MEDICINE
Payer: COMMERCIAL

## 2023-10-28 ENCOUNTER — APPOINTMENT (EMERGENCY)
Dept: CT IMAGING | Facility: HOSPITAL | Age: 58
End: 2023-10-28
Payer: COMMERCIAL

## 2023-10-28 VITALS
SYSTOLIC BLOOD PRESSURE: 147 MMHG | RESPIRATION RATE: 18 BRPM | WEIGHT: 189.38 LBS | BODY MASS INDEX: 33.55 KG/M2 | HEART RATE: 95 BPM | DIASTOLIC BLOOD PRESSURE: 82 MMHG | TEMPERATURE: 98.6 F | OXYGEN SATURATION: 96 %

## 2023-10-28 DIAGNOSIS — E87.1 HYPONATREMIA: ICD-10-CM
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
## 2023-10-28 DIAGNOSIS — M79.18 MUSCULOSKELETAL PAIN: ICD-10-CM

## 2023-10-28 DIAGNOSIS — S70.10XA: Primary | ICD-10-CM

## 2023-10-28 LAB
ALBUMIN SERPL BCP-MCNC: 3.7 G/DL (ref 3.5–5)
ALP SERPL-CCNC: 71 U/L (ref 34–104)
ALT SERPL W P-5'-P-CCNC: 17 U/L (ref 7–52)
ANION GAP SERPL CALCULATED.3IONS-SCNC: 5 MMOL/L
AST SERPL W P-5'-P-CCNC: 25 U/L (ref 13–39)
BASOPHILS # BLD AUTO: 0.03 THOUSANDS/ÂΜL (ref 0–0.1)
BASOPHILS NFR BLD AUTO: 1 % (ref 0–1)
BILIRUB SERPL-MCNC: 0.57 MG/DL (ref 0.2–1)
BUN SERPL-MCNC: 18 MG/DL (ref 5–25)
CALCIUM SERPL-MCNC: 8.2 MG/DL (ref 8.4–10.2)
CHLORIDE SERPL-SCNC: 94 MMOL/L (ref 96–108)
CO2 SERPL-SCNC: 29 MMOL/L (ref 21–32)
CREAT SERPL-MCNC: 0.81 MG/DL (ref 0.6–1.3)
EOSINOPHIL # BLD AUTO: 0.28 THOUSAND/ÂΜL (ref 0–0.61)
EOSINOPHIL NFR BLD AUTO: 5 % (ref 0–6)
ERYTHROCYTE [DISTWIDTH] IN BLOOD BY AUTOMATED COUNT: 14.1 % (ref 11.6–15.1)
GFR SERPL CREATININE-BSD FRML MDRD: 80 ML/MIN/1.73SQ M
GLUCOSE SERPL-MCNC: 127 MG/DL (ref 65–140)
HCT VFR BLD AUTO: 30.2 % (ref 34.8–46.1)
HGB BLD-MCNC: 10 G/DL (ref 11.5–15.4)
IMM GRANULOCYTES # BLD AUTO: 0.02 THOUSAND/UL (ref 0–0.2)
IMM GRANULOCYTES NFR BLD AUTO: 0 % (ref 0–2)
LIPASE SERPL-CCNC: 12 U/L (ref 11–82)
LYMPHOCYTES # BLD AUTO: 1.47 THOUSANDS/ÂΜL (ref 0.6–4.47)
LYMPHOCYTES NFR BLD AUTO: 24 % (ref 14–44)
MCH RBC QN AUTO: 31.3 PG (ref 26.8–34.3)
MCHC RBC AUTO-ENTMCNC: 33.1 G/DL (ref 31.4–37.4)
MCV RBC AUTO: 94 FL (ref 82–98)
MONOCYTES # BLD AUTO: 0.49 THOUSAND/ÂΜL (ref 0.17–1.22)
MONOCYTES NFR BLD AUTO: 8 % (ref 4–12)
NEUTROPHILS # BLD AUTO: 3.87 THOUSANDS/ÂΜL (ref 1.85–7.62)
NEUTS SEG NFR BLD AUTO: 62 % (ref 43–75)
NRBC BLD AUTO-RTO: 0 /100 WBCS
PLATELET # BLD AUTO: 226 THOUSANDS/UL (ref 149–390)
PMV BLD AUTO: 9.6 FL (ref 8.9–12.7)
POTASSIUM SERPL-SCNC: 3.6 MMOL/L (ref 3.5–5.3)
PROT SERPL-MCNC: 6 G/DL (ref 6.4–8.4)
RBC # BLD AUTO: 3.2 MILLION/UL (ref 3.81–5.12)
SODIUM SERPL-SCNC: 128 MMOL/L (ref 135–147)
WBC # BLD AUTO: 6.16 THOUSAND/UL (ref 4.31–10.16)

## 2023-10-28 PROCEDURE — 85025 COMPLETE CBC W/AUTO DIFF WBC: CPT | Performed by: EMERGENCY MEDICINE

## 2023-10-28 PROCEDURE — 74177 CT ABD & PELVIS W/CONTRAST: CPT

## 2023-10-28 PROCEDURE — 36415 COLL VENOUS BLD VENIPUNCTURE: CPT | Performed by: EMERGENCY MEDICINE

## 2023-10-28 PROCEDURE — 70450 CT HEAD/BRAIN W/O DYE: CPT

## 2023-10-28 PROCEDURE — 71260 CT THORAX DX C+: CPT

## 2023-10-28 PROCEDURE — 72125 CT NECK SPINE W/O DYE: CPT

## 2023-10-28 PROCEDURE — 96374 THER/PROPH/DIAG INJ IV PUSH: CPT

## 2023-10-28 PROCEDURE — 99285 EMERGENCY DEPT VISIT HI MDM: CPT | Performed by: EMERGENCY MEDICINE

## 2023-10-28 PROCEDURE — 99284 EMERGENCY DEPT VISIT MOD MDM: CPT

## 2023-10-28 PROCEDURE — 80053 COMPREHEN METABOLIC PANEL: CPT | Performed by: EMERGENCY MEDICINE

## 2023-10-28 PROCEDURE — 83690 ASSAY OF LIPASE: CPT | Performed by: EMERGENCY MEDICINE

## 2023-10-28 RX ORDER — OXYCODONE HYDROCHLORIDE 5 MG/1
5 TABLET ORAL EVERY 4 HOURS PRN
Qty: 5 TABLET | Refills: 0 | Status: SHIPPED | OUTPATIENT
Start: 2023-10-28 | End: 2023-11-07

## 2023-10-28 RX ORDER — MORPHINE SULFATE 4 MG/ML
4 INJECTION, SOLUTION INTRAMUSCULAR; INTRAVENOUS ONCE
Status: COMPLETED | OUTPATIENT
Start: 2023-10-28 | End: 2023-10-28

## 2023-10-28 RX ADMIN — IOHEXOL 100 ML: 350 INJECTION, SOLUTION INTRAVENOUS at 19:32

## 2023-10-28 RX ADMIN — MORPHINE SULFATE 4 MG: 4 INJECTION INTRAVENOUS at 20:30

## 2023-10-28 NOTE — ED PROVIDER NOTES
History  Chief Complaint   Patient presents with   • Fall     Pt presents with fall from home down cement stairs, +headstrike +ASA. HPI    54-year-old female presenting to the emergency department with diffuse body pain and bruising after slipping down 18 cement stairs. Past medical history significant for brain aneurysm, lipidemia, hypertension, hypothyroidism, MI, vitiligo. Reports that her mother  yesterday, was drinking EtOH, then slipped down the stairs. Positive head strike, no LOC. Is taking baby aspirin. Patient reports that she felt okay that evening, went to sleep, however this morning woke up with severe pain in her upper back, bilateral ribs, right thigh and noted diffuse swelling/bruising. Tylenol for her pain -mild improvement in symptoms. Denies fever, chest pain, shortness of breath, visual changes, nausea, vomiting, urinary symptoms, changes in stool, gait ataxia, or tingling, weakness. Prior to Admission Medications   Prescriptions Last Dose Informant Patient Reported? Taking?    ARMOUR THYROID 90 MG tablet  Self No No   Sig: TAKE 1 TABLET DAILY   Patient taking differently: Take 90 mg by mouth every other day Takes 1/2 of pill   Marshall Thyroid 120 MG tablet  Self Yes No   Sig: Take 120 mg by mouth every other day   B Complex Vitamins (B COMPLEX PO)  Self Yes No   Sig: Take 1 tablet by mouth in the morning   CALCIUM-MAGNESIUM-VITAMIN D PO  Self Yes No   Sig: Take 2 caplets by mouth in the morning   Calcium Ascorbate (VITAMIN C) 500 mg tablet  Self Yes No   Sig: Take 500 mg by mouth daily   amoxicillin (AMOXIL) 500 MG tablet  Self Yes No   Sig: Take 500 mg by mouth 2 (two) times a day   aspirin (ECOTRIN) 325 mg EC tablet  Self No No   Sig: Take 1 tablet (325 mg total) by mouth daily Begin one week prior to procedure   hydrochlorothiazide (HYDRODIURIL) 25 mg tablet  Self Yes No   Sig: Take 25 mg by mouth daily   irbesartan (AVAPRO) 150 mg tablet  Self Yes No   Sig: Take 150 mg by mouth every other day Alternating with 300mg dose   irbesartan (AVAPRO) 300 mg tablet  Self Yes No   Sig: Take 300 mg by mouth every other day   multivitamin (THERAGRAN) TABS  Self Yes No   Sig: Take 1 tablet by mouth daily   rosuvastatin (CRESTOR) 5 mg tablet  Self Yes No   Si mg       Facility-Administered Medications: None       Past Medical History:   Diagnosis Date   • Aneurysm ([address])     brain   • Arthritis    • Breast cancer ([address])    • Disease of thyroid gland    • Dizziness    • Hyperlipidemia    • Hypertension    • Hypothyroid    • Myocardial infarction [facility])    • Vitiligo        Past Surgical History:   Procedure Laterality Date   • BREAST CYST EXCISION Left     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   • BREAST LUMPECTOMY Left 2001    DCIS   • BREAST SURGERY     • COLONOSCOPY     • HIP SURGERY Right     , ,     • IR CEREBRAL ANGIOGRAPHY  2022   • IR CEREBRAL ANGIOGRAPHY  2023   • MT TEAEC W/PATCH GRF CAROTID VERTB SUBCLAV NECK INC Right 10/13/2022    Procedure: ENDARTERECTOMY ARTERY CAROTID (Eversion) WITH BOVINE PATCH ANGIOPLASTY;  Surgeon: Sonia Donato DO;  Location: [facility];  Service: Vascular       Family History   Problem Relation Age of Onset   • Other Mother         Carotid stenosis, bilateral    • Heart disease Mother    • Hypertension Mother    • Lung cancer Father         smoker   • Liver cancer Father    • Hypertension Father    • No Known Problems Sister    • No Known Problems Sister    • Heart attack Maternal Grandmother    • Thyroid disease Maternal Grandmother    • No Known Problems Maternal Grandfather    • No Known Problems Paternal Grandmother    • No Known Problems Paternal Grandfather    • Hypertension Brother    • No Known Problems Brother    • No Known Problems Maternal Aunt    • No Known Problems Maternal Aunt    • Cancer Paternal Aunt      I have reviewed and agree with the history as documented. E-Cigarette/Vaping   • E-Cigarette Use Never User      E-Cigarette/Vaping Substances   • Nicotine No    • THC No    • CBD No    • Flavoring No    • Other No    • Unknown No      Social History     Tobacco Use   • Smoking status: Former   • Smokeless tobacco: Never   • Tobacco comments:     Smoked 20yrs up to 1ppd. Quit in 2008   Vaping Use   • Vaping Use: Never used   Substance Use Topics   • Alcohol use: Yes     Comment: Occasional    • Drug use: Never       Review of Systems   Constitutional:  Negative for chills and fever. Respiratory:  Negative for apnea, cough, choking, chest tightness, shortness of breath, wheezing and stridor. Cardiovascular:  Positive for chest pain (Lower rib pain). Negative for leg swelling. Gastrointestinal:  Negative for abdominal distention, abdominal pain, constipation, diarrhea, nausea, rectal pain and vomiting. Genitourinary:  Negative for dysuria and hematuria. Musculoskeletal:  Positive for arthralgias, back pain, myalgias and neck pain. Negative for gait problem. Bruising   Skin:  Negative for color change, pallor, rash and wound. Neurological:  Negative for dizziness, tremors, seizures, syncope, facial asymmetry, speech difficulty, weakness, light-headedness, numbness and headaches. Physical Exam  Physical Exam  Vitals and nursing note reviewed. Constitutional:       General: She is not in acute distress. Appearance: She is well-developed. She is not ill-appearing, toxic-appearing or diaphoretic. HENT:      Head: Normocephalic and atraumatic. Right Ear: External ear normal.      Left Ear: External ear normal.      Nose: Nose normal.   Eyes:      General:         Right eye: No discharge. Left eye: No discharge. Conjunctiva/sclera: Conjunctivae normal.      Pupils: Pupils are equal, round, and reactive to light. Cardiovascular:      Rate and Rhythm: Normal rate and regular rhythm.       Heart sounds: Normal heart sounds. No murmur heard. No friction rub. No gallop. Pulmonary:      Effort: Pulmonary effort is normal. No respiratory distress. Breath sounds: Normal breath sounds. No stridor. No wheezing, rhonchi or rales. Chest:      Chest wall: No tenderness. Abdominal:      General: Bowel sounds are normal. There is no distension. Palpations: Abdomen is soft. There is no mass. Tenderness: There is no abdominal tenderness. There is no guarding or rebound. Hernia: No hernia is present. Musculoskeletal:         General: Swelling (Right thigh) present. No deformity. Normal range of motion. Cervical back: Normal range of motion and neck supple. Tenderness (Lower C-spine) present. Skin:     General: Skin is warm and dry. Capillary Refill: Capillary refill takes less than 2 seconds. Findings: Bruising (bruising over right arm, lower back bilateral, right thigh, bilateral calves) present. Neurological:      Mental Status: She is alert and oriented to person, place, and time.    Psychiatric:         Mood and Affect: Mood normal.         Vital Signs  ED Triage Vitals   Temperature Pulse Respirations Blood Pressure SpO2   10/28/23 1850 10/28/23 1850 10/28/23 1850 10/28/23 1850 10/28/23 1850   98.6 °F (37 °C) 97 17 143/89 99 %      Temp Source Heart Rate Source Patient Position - Orthostatic VS BP Location FiO2 (%)   10/28/23 1850 10/28/23 1850 10/28/23 1850 10/28/23 1850 --   Oral Monitor Sitting Left arm       Pain Score       10/28/23 1858       2           Vitals:    10/28/23 1850 10/28/23 1858   BP: 143/89 (!) 203/88   Pulse: 97 98   Patient Position - Orthostatic VS: Sitting          Visual Acuity  Visual Acuity      Flowsheet Row Most Recent Value   L Pupil Size (mm) 3   R Pupil Size (mm) 3            ED Medications  Medications - No data to display    Diagnostic Studies  Results Reviewed       Procedure Component Value Units Date/Time    CBC and differential [[id number]]     Lab Status: No result Specimen: Blood     Comprehensive metabolic panel [[id number]]     Lab Status: No result Specimen: Blood     Lipase [[id number]]     Lab Status: No result Specimen: Blood                    CT head without contrast    (Results Pending)   CT spine cervical without contrast    (Results Pending)   CT chest abdomen pelvis w contrast    (Results Pending)   CT recon only thoracolumbar    (Results Pending)              Procedures  Procedures         ED Course  ED Course as of 10/28/23 2056   Sat Oct 28, 2023   234 54-year-old female presenting to the emergency department extensive bruising and pain over her entire body after a fall. Vital signs on arrival within normal limits. Patient's physical exam is remarkable for extensive bruising across upper extremities, lower back, right thigh, bilateral calves. Midline tenderness of the C-spine. Bilateral rib tenderness. Differential diagnosis risk: Fracture, dislocation, hematoma, soft tissue injury, sprain, intracranial bleed. Lab work and imaging was ordered. Is declining analgesia at this time. 2026 Sodium(!): 128   2026 Hemoglobin(!): 10.0  Baseline   2027 Complaining of pain. 4 mg IV morphine was administered for pain control. 2034 CT recon only thoracolumbar   2046 CT spine cervical without contrast   2046 CT chest abdomen pelvis w contrast   2047 CT head without contrast   2047 CTs without any acute traumatic findings. 2047 Lab work including CBC, CMP, lipase were grossly unremarkable aside from anemia and hyponatremia. Upon re-assessment, patient feels improved. Patient remained hemodynamically and clinically stable in the ED. Strict return precautions given. Patient verbalized understanding and will follow up as outpatient with PMD.  Also notified the patient about her hyponatremia and need for recheck in 1 week. Upon discharge, patient was [place], and fully ambulatory.                                                  Medical Decision Making  Amount and/or Complexity of Data Reviewed  Labs: ordered. Decision-making details documented in ED Course. Radiology: ordered. Decision-making details documented in ED Course. Risk  Prescription drug management. Disposition  Final diagnoses:   None     ED Disposition       None          Follow-up Information    None         Patient's Medications   Discharge Prescriptions    No medications on file       No discharge procedures on file.     PDMP Review       None            ED Provider  Electronically Signed by             Betzaida Page MD  10/28/23 [id number]

## 2023-10-29 NOTE — DISCHARGE INSTRUCTIONS
Please make sure to follow-up with a provider within 1 week to recheck your sodium levels. Your sodium level today was 128, she is considered low. Increase your salt intake, decrease the amount of water you are drinking. If you develop confusion, seizures, visual changes, or any other concerning symptoms, please visit an emergency department to be signs of worsening symptoms. In the meantime, you can also take Tylenol and anti-inflammatory pain medications for your musculoskeletal pain from fall.

## 2024-02-02 ENCOUNTER — HOSPITAL ENCOUNTER (OUTPATIENT)
Dept: RADIOLOGY | Facility: MEDICAL CENTER | Age: 59
Discharge: HOME | End: 2024-02-02
Payer: COMMERCIAL

## 2024-02-02 DIAGNOSIS — Z98.890 HISTORY OF CEA (CAROTID ENDARTERECTOMY): ICD-10-CM

## 2024-02-02 PROCEDURE — 93880 EXTRACRANIAL BILAT STUDY: CPT | Performed by: SURGERY

## 2024-02-02 PROCEDURE — 93880 EXTRACRANIAL BILAT STUDY: CPT

## 2024-09-17 ENCOUNTER — TELEPHONE (OUTPATIENT)
Dept: NEUROSURGERY | Facility: CLINIC | Age: 59
End: 2024-09-17

## 2024-09-18 ENCOUNTER — HOSPITAL ENCOUNTER (OUTPATIENT)
Dept: RADIOLOGY | Age: 59
Discharge: HOME | End: 2024-09-18
Payer: COMMERCIAL

## 2024-09-18 DIAGNOSIS — I67.1 CEREBRAL ANEURYSM: ICD-10-CM

## 2024-09-18 PROCEDURE — 70546 MR ANGIOGRAPH HEAD W/O&W/DYE: CPT

## 2024-09-18 PROCEDURE — A9585 GADOBUTROL INJECTION: HCPCS | Performed by: NEUROLOGICAL SURGERY

## 2024-09-18 PROCEDURE — G1004 CDSM NDSC: HCPCS

## 2024-09-18 RX ORDER — GADOBUTROL 604.72 MG/ML
9 INJECTION INTRAVENOUS
Status: COMPLETED | OUTPATIENT
Start: 2024-09-18 | End: 2024-09-18

## 2024-09-18 RX ADMIN — GADOBUTROL 9 ML: 604.72 INJECTION INTRAVENOUS at 14:51

## 2024-09-23 NOTE — ASSESSMENT & PLAN NOTE
1 year follow up s/p WEB embolization of right ACoA aneurysm by Dr. Nichole 3/13/23  5-6 mm ACoA aneurysm noted incidentally during workup for carotid stenosis in 2022 with eventual CEA.  6 month PO angiogram 9/2023 showed minimal filling at base and <50% stentosis of proximal right A2   On 81mg ASA  FH of aneurysm in mother. Former smoker   Exam: hyperreflexic throughout with Robb's bilaterally and 1-2 beats of clonus, otherwise non-focal    Imaging:  MRA head w/wo 9/18/24: Stable treated anterior communicating artery region aneurysm without evidence of recurrent or residual aneurysmal flow.     Plan:  Reviewed imaging with patient and Dr. Nichole.  The aneurysm appears well treated with no evidence of residual or recurrence  No neurosurgical invention recommended.  Continue 81mg ASA  Recommend serial surveillance to evaluate for any new aneurysm formation or change in the treated aneurysm. Usually recommend yearly follow up for 5 years post-treatment, then change in every 2 years for a total of 10 years.   Follow up in 1 year with MRA head w/wo contrast as joint appointment with Dr. Nichole. If stable at that time, may consider spacing out to 2 years.   Call sooner with any questions or concerns.     Orders:    BUN; Future    Creatinine, serum; Future    MRA head w wo contrast; Future

## 2024-09-25 ENCOUNTER — OFFICE VISIT (OUTPATIENT)
Dept: NEUROSURGERY | Facility: CLINIC | Age: 59
End: 2024-09-25
Payer: COMMERCIAL

## 2024-09-25 VITALS
HEART RATE: 77 BPM | TEMPERATURE: 98.1 F | WEIGHT: 170 LBS | RESPIRATION RATE: 18 BRPM | OXYGEN SATURATION: 99 % | DIASTOLIC BLOOD PRESSURE: 76 MMHG | HEIGHT: 63 IN | BODY MASS INDEX: 30.12 KG/M2 | SYSTOLIC BLOOD PRESSURE: 142 MMHG

## 2024-09-25 DIAGNOSIS — I67.1 CEREBRAL ANEURYSM: Primary | ICD-10-CM

## 2024-09-25 PROCEDURE — 99214 OFFICE O/P EST MOD 30 MIN: CPT | Performed by: PHYSICIAN ASSISTANT

## 2024-09-25 RX ORDER — ASPIRIN 81 MG/1
81 TABLET, CHEWABLE ORAL DAILY
COMMUNITY

## 2024-09-25 NOTE — PROGRESS NOTES
Ambulatory Visit  Name: Janee Mckinney      : 1965      MRN: [id number]  Encounter Provider: Pramod Nichole MD  Encounter Date: 2024   Encounter department: [facility]    Assessment & Plan  Cerebral aneurysm  1 year follow up s/p WEB embolization of right ACoA aneurysm by Dr. Nichole 3/13/23  5-6 mm ACoA aneurysm noted incidentally during workup for carotid stenosis in  with eventual CEA.  6 month PO angiogram 2023 showed minimal filling at base and <50% stentosis of proximal right A2   On 81mg ASA  FH of aneurysm in mother. Former smoker   Exam: hyperreflexic throughout with Robb's bilaterally and 1-2 beats of clonus, otherwise non-focal    Imaging:  MRA head w/wo 24: Stable treated anterior communicating artery region aneurysm without evidence of recurrent or residual aneurysmal flow.     Plan:  Reviewed imaging with patient and Dr. Nichole.  The aneurysm appears well treated with no evidence of residual or recurrence  No neurosurgical invention recommended.  Continue 81mg ASA  Recommend serial surveillance to evaluate for any new aneurysm formation or change in the treated aneurysm. Usually recommend yearly follow up for 5 years post-treatment, then change in every 2 years for a total of 10 years.   Follow up in 1 year with MRA head w/wo contrast as joint appointment with Dr. Nichole. If stable at that time, may consider spacing out to 2 years.   Call sooner with any questions or concerns.     Orders:    BUN; Future    Creatinine, serum; Future    MRA head w wo contrast; Future      History of Present Illness   59 year old female seen for 1 year follow up s/p WEB embolization of right ACoA aneurysm by Dr. Nichole 3/13/23.  This was incidentally noted on work up of carotid stenosis in  with eventual CEA. 6 month PO angiogram 2023 showed no residual significant aneurysm, though minimal filling at base and <50% stentosis of proximal right A2. MRA  shows no evidence of residual or recurrence. Taking 81mg ASA. FH of aneurysm in mother. Former smoker.  Since last seen, she has no new neurologic complaints.       Review of Systems   Constitutional: Negative.    HENT:  Negative for tinnitus.    Eyes: Negative.  Visual disturbance: wears glasses.   Respiratory: Negative.     Cardiovascular: Negative.    Gastrointestinal:  Negative for nausea and vomiting.   Endocrine: Negative.    Genitourinary: Negative.    Musculoskeletal: Negative.    Skin: Negative.    Allergic/Immunologic: Negative.    Neurological:  Negative for dizziness, speech difficulty, weakness, numbness and headaches.   Hematological: Negative.    Psychiatric/Behavioral:  Negative for confusion.      I have personally reviewed the MA's review of systems and made changes as necessary.    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    BREAST LUMPECTOMY Left 04/2001    DCIS    BREAST SURGERY      COLONOSCOPY      HIP SURGERY Right     1966, 1975, 1976     IR CEREBRAL ANGIOGRAPHY  12/2/2022    IR CEREBRAL ANGIOGRAPHY  9/8/2023    TN TEAEC W/PATCH GRF CAROTID VERTB SUBCLAV NECK INC Right 10/13/2022    Procedure: ENDARTERECTOMY ARTERY CAROTID (Eversion) WITH BOVINE PATCH ANGIOPLASTY;  Surgeon: Jonathan Leigh DO;  Location: [facility];  Service: Vascular     Family History   Problem Relation Age of Onset    Other Mother         Carotid stenosis, bilateral     Heart disease Mother     Hypertension Mother     Lung cancer Father         smoker    Liver cancer Father     Hypertension Father     No Known Problems Sister      No Known Problems Sister     Heart attack Maternal Grandmother     Thyroid disease Maternal Grandmother     No Known Problems Maternal Grandfather     No Known Problems Paternal Grandmother     No Known Problems Paternal Grandfather     Hypertension Brother     No Known Problems Brother     No Known Problems Maternal Aunt     No Known Problems Maternal Aunt     Cancer Paternal Aunt      Current Outpatient Medications on File Prior to Visit   Medication Sig Dispense Refill    [facility] Thyroid 120 MG tablet Take 120 mg by mouth every other day      ARMOUR THYROID 90 MG tablet TAKE 1 TABLET DAILY (Patient taking differently: Take 90 mg by mouth every other day Takes 1/2 of pill) 30 tablet 0    aspirin 81 mg chewable tablet Chew 81 mg daily      B Complex Vitamins (B COMPLEX PO) Take 1 tablet by mouth in the morning      Calcium Ascorbate (VITAMIN C) 500 mg tablet Take 500 mg by mouth daily      CALCIUM-MAGNESIUM-VITAMIN D PO Take 2 caplets by mouth in the morning      irbesartan (AVAPRO) 150 mg tablet Take 150 mg by mouth every other day Alternating with 300mg dose      irbesartan (AVAPRO) 300 mg tablet Take 300 mg by mouth every other day      multivitamin (THERAGRAN) TABS Take 1 tablet by mouth daily      amoxicillin (AMOXIL) 500 MG tablet Take 500 mg by mouth 2 (two) times a day      aspirin (ECOTRIN) 325 mg EC tablet Take 1 tablet (325 mg total) by mouth daily Begin one week prior to procedure (Patient not taking: Reported on 9/25/2024) 30 tablet 0    hydrochlorothiazide (HYDRODIURIL) 25 mg tablet Take 25 mg by mouth daily (Patient not taking: Reported on 9/25/2024)      rosuvastatin (CRESTOR) 5 mg tablet 5 mg Mon wed fri (Patient not taking: Reported on 9/25/2024)       No current facility-administered medications on file prior to visit.     Allergies   Allergen Reactions    Rosuvastatin Other (See Comments)     cramping    Other Rash     Adhesives - Paper tape OK   Redness      Social History     Tobacco Use     "Smoking status: Former    Smokeless tobacco: Never    Tobacco comments:     Smoked 20yrs up to 1ppd.  Quit in 2008   Vaping Use    Vaping status: Never Used   Substance and Sexual Activity    Alcohol use: Yes     Comment: Occasional     Drug use: Never    Sexual activity: Not Currently     Partners: Male     Birth control/protection: Post-menopausal     Objective     /76 (BP Location: Left arm, Patient Position: Sitting, Cuff Size: Adult)   Pulse 77   Temp 98.1 °F (36.7 °C) (Temporal)   Resp 18   Ht 5' 3\" (1.6 m)   Wt 77.1 kg (170 lb)   SpO2 99%   BMI 30.11 kg/m²     Physical Exam  Vitals reviewed.   Constitutional:       General: She is awake.      Appearance: Normal appearance.   HENT:      Head: Normocephalic and atraumatic.   Eyes:      Extraocular Movements: EOM normal.      Conjunctiva/sclera: Conjunctivae normal.      Pupils: Pupils are equal, round, and reactive to light.   Cardiovascular:      Rate and Rhythm: Normal rate.   Pulmonary:      Effort: Pulmonary effort is normal.   Skin:     General: Skin is warm and dry.   Neurological:      Mental Status: She is alert and oriented to person, place, and time.      Motor: Motor strength is normal.     Gait: Gait is intact.      Deep Tendon Reflexes:      Reflex Scores:       Bicep reflexes are 3+ on the right side and 3+ on the left side.       Brachioradialis reflexes are 3+ on the right side and 3+ on the left side.       Patellar reflexes are 3+ on the right side and 3+ on the left side.  Psychiatric:         Attention and Perception: Attention and perception normal.         Mood and Affect: Mood and affect normal.         Speech: Speech normal.         Behavior: Behavior normal. Behavior is cooperative.         Thought Content: Thought content normal.         Cognition and Memory: Cognition and memory normal.         Judgment: Judgment normal.       Neurologic Exam     Mental Status   Oriented to person, place, and time.   Oriented to year and " month.   Follows 2 step commands.   Attention: normal. Concentration: normal.   Speech: speech is normal   Level of consciousness: alert  Knowledge: good. Able to perform simple calculations.   Able to name object.     Cranial Nerves     CN III, IV, VI   Pupils are equal, round, and reactive to light.  Extraocular motions are normal.   Right pupil: Shape: regular. Reactivity: brisk. Consensual response: intact.   Left pupil: Shape: regular. Reactivity: brisk. Consensual response: intact.   CN III: no CN III palsy  CN VI: no CN VI palsy  Nystagmus: none   Ophthalmoparesis: none  Upgaze: normal  Downgaze: normal  Conjugate gaze: present    CN V   Facial sensation intact.     CN VII   Facial expression full, symmetric.     CN VIII   CN VIII normal.     CN XI   Right trapezius strength: normal  Left trapezius strength: normal    CN XII   CN XII normal.     Motor Exam   Muscle bulk: normal  Overall muscle tone: normal    Strength   Strength 5/5 throughout.     Sensory Exam   Light touch normal.     Gait, Coordination, and Reflexes     Gait  Gait: normal    Tremor   Resting tremor: absent  Intention tremor: absent  Action tremor: absent    Reflexes   Right brachioradialis: 3+  Left brachioradialis: 3+  Right biceps: 3+  Left biceps: 3+  Right patellar: 3+  Left patellar: 3+  Right Robb: present  Left Robb: present  Right ankle clonus: present (1-2 beats)  Left ankle clonus: present (1-2 beats)      I personally reviewed the following image studies in PACS and associated radiology reports: MRA head. My interpretation of the radiology images/reports is: as above.    Administrative Statements   I have spent a total time of 40 minutes in caring for this patient on the day of the visit/encounter including Diagnostic results, Risks and benefits of tx options, Instructions for management, Patient and family education, Impressions, Counseling / Coordination of care, Documenting in the medical record, Reviewing / ordering  tests, medicine, procedures  , Obtaining or reviewing history  , and Communicating with other healthcare professionals .

## 2025-01-16 ENCOUNTER — TELEPHONE (OUTPATIENT)
Age: 60
End: 2025-01-16

## 2025-01-16 ENCOUNTER — TRANSCRIBE ORDERS (OUTPATIENT)
Dept: VASCULAR SURGERY | Facility: CLINIC | Age: 60
End: 2025-01-16

## 2025-01-16 DIAGNOSIS — I65.21 CAROTID STENOSIS, RIGHT: Primary | ICD-10-CM

## 2025-01-16 DIAGNOSIS — Z98.890 HISTORY OF CAROTID ENDARTERECTOMY: ICD-10-CM

## 2025-01-16 NOTE — TELEPHONE ENCOUNTER
Pt called to sched her 1year F/U but is requesting the order for her VAS Carotid comp ,needs it done before seeing Dr PATEL on 03/11/25, please call PT with an update.

## 2025-02-03 ENCOUNTER — HOSPITAL ENCOUNTER (OUTPATIENT)
Dept: RADIOLOGY | Facility: MEDICAL CENTER | Age: 60
Discharge: HOME | End: 2025-02-03
Payer: COMMERCIAL

## 2025-02-03 DIAGNOSIS — Z98.890 HISTORY OF CAROTID ENDARTERECTOMY: ICD-10-CM

## 2025-02-03 DIAGNOSIS — I65.21 CAROTID STENOSIS, RIGHT: ICD-10-CM

## 2025-02-03 PROCEDURE — 93880 EXTRACRANIAL BILAT STUDY: CPT

## 2025-02-04 PROCEDURE — 93880 EXTRACRANIAL BILAT STUDY: CPT | Performed by: SURGERY

## 2025-04-08 ENCOUNTER — OFFICE VISIT (OUTPATIENT)
Dept: VASCULAR SURGERY | Facility: CLINIC | Age: 60
End: 2025-04-08
Payer: COMMERCIAL

## 2025-04-08 VITALS
HEIGHT: 63 IN | BODY MASS INDEX: 31.71 KG/M2 | OXYGEN SATURATION: 98 % | WEIGHT: 179 LBS | HEART RATE: 88 BPM | TEMPERATURE: 98.4 F | DIASTOLIC BLOOD PRESSURE: 82 MMHG | SYSTOLIC BLOOD PRESSURE: 138 MMHG

## 2025-04-08 DIAGNOSIS — Z98.890 H/O CAROTID ENDARTERECTOMY: Primary | ICD-10-CM

## 2025-04-08 DIAGNOSIS — I65.21 CAROTID STENOSIS, RIGHT: ICD-10-CM

## 2025-04-08 PROCEDURE — 99214 OFFICE O/P EST MOD 30 MIN: CPT | Performed by: SURGERY

## 2025-04-08 NOTE — ASSESSMENT & PLAN NOTE
History of right carotid endarterectomy.  Janee returns to the office to review surveillance carotid duplex.  Duplex demonstrates a widely patent endarterectomy site.  The left carotid is less than 50%.  She denies any focal neurologic deficits.  She should continue on her aspirin and rosuvastatin.  Continue surveillance with yearly duplex.

## 2025-04-08 NOTE — PROGRESS NOTES
Name: Janee Mckinney      : 1965      MRN: [id number]  Encounter Provider: Jonathan Leigh DO  Encounter Date: 2025   Encounter department: [facility]  :  Assessment & Plan  H/O carotid endarterectomy    Orders:    VAS carotid complete study; Future    Carotid stenosis, right  History of right carotid endarterectomy.  Janee returns to the office to review surveillance carotid duplex.  Duplex demonstrates a widely patent endarterectomy site.  The left carotid is less than 50%.  She denies any focal neurologic deficits.  She should continue on her aspirin and rosuvastatin.  Continue surveillance with yearly duplex.             History of Present Illness   HPI  Janee Mckinney is a 59 y.o. female who presents to the office to review surveillance carotid duplex.  She offers no specific complaints.  She denies any neurologic events.    Patient presents here for -History of CEA (carotid endarterectomy  25 VAS carotid comp. Patient denies any TIA/ stoke like symptoms.   History obtained from: patient    Review of Systems   All other systems reviewed and are negative.    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    BREAST LUMPECTOMY Left 2001    DCIS    BREAST SURGERY      COLONOSCOPY      HIP SURGERY Right     , ,      IR CEREBRAL ANGIOGRAPHY  2022    IR CEREBRAL ANGIOGRAPHY  2023    DC TEAEC W/PATCH GRF CAROTID VERTB SUBCLAV NECK INC Right 10/13/2022    Procedure: ENDARTERECTOMY ARTERY CAROTID (Eversion) WITH BOVINE PATCH ANGIOPLASTY;  Surgeon: Jonathan Leigh  DO;  Location: [facility];  Service: Vascular     Family History   Problem Relation Age of Onset    Other Mother         Carotid stenosis, bilateral     Heart disease Mother     Hypertension Mother     Lung cancer Father         smoker    Liver cancer Father     Hypertension Father     No Known Problems Sister     No Known Problems Sister     Heart attack Maternal Grandmother     Thyroid disease Maternal Grandmother     No Known Problems Maternal Grandfather     No Known Problems Paternal Grandmother     No Known Problems Paternal Grandfather     Hypertension Brother     No Known Problems Brother     No Known Problems Maternal Aunt     No Known Problems Maternal Aunt     Cancer Paternal Aunt       reports that she has quit smoking. She has never used smokeless tobacco. She reports current alcohol use. She reports that she does not use drugs.  Current Outpatient Medications   Medication Instructions    amoxicillin (AMOXIL) 500 mg, 2 times daily    ARMOUR THYROID 90 MG tablet TAKE 1 TABLET DAILY    [facility] Thyroid 120 mg, Every other day    aspirin (ECOTRIN) 325 mg, Oral, Daily, Begin one week prior to procedure    aspirin 81 mg, Daily    B Complex Vitamins (B COMPLEX PO) 1 tablet, Daily    Calcium Ascorbate (VITAMIN C) 500 mg, Daily    CALCIUM-MAGNESIUM-VITAMIN D PO 2 caplets, Daily    hydroCHLOROthiazide 25 mg, Daily    irbesartan (AVAPRO) 150 mg, Every other day    irbesartan (AVAPRO) 300 mg, Every other day    multivitamin (THERAGRAN) TABS 1 tablet, Daily    rosuvastatin (CRESTOR) 5 mg     Allergies   Allergen Reactions    Rosuvastatin Other (See Comments)     cramping    Other Rash     Adhesives - Paper tape OK   Redness      Current Outpatient Medications on File Prior to Visit   Medication Sig Dispense Refill    amoxicillin (AMOXIL) 500 MG tablet Take 500 mg by mouth 2 (two) times a day      [facility] Thyroid 120 MG tablet Take 120 mg by mouth every other day      ARMOUR THYROID 90 MG tablet TAKE 1 TABLET DAILY  "(Patient taking differently: Take 90 mg by mouth every other day Takes 1/2 of pill) 30 tablet 0    aspirin 81 mg chewable tablet Chew 81 mg daily      B Complex Vitamins (B COMPLEX PO) Take 1 tablet by mouth in the morning      Calcium Ascorbate (VITAMIN C) 500 mg tablet Take 500 mg by mouth daily      CALCIUM-MAGNESIUM-VITAMIN D PO Take 2 caplets by mouth in the morning      irbesartan (AVAPRO) 150 mg tablet Take 150 mg by mouth every other day Alternating with 300mg dose      irbesartan (AVAPRO) 300 mg tablet Take 300 mg by mouth every other day      multivitamin (THERAGRAN) TABS Take 1 tablet by mouth daily      aspirin (ECOTRIN) 325 mg EC tablet Take 1 tablet (325 mg total) by mouth daily Begin one week prior to procedure 30 tablet 0    hydrochlorothiazide (HYDRODIURIL) 25 mg tablet Take 25 mg by mouth daily (Patient not taking: Reported on 9/25/2024)      rosuvastatin (CRESTOR) 5 mg tablet 5 mg Mon wed fri (Patient not taking: Reported on 9/25/2024)       No current facility-administered medications on file prior to visit.      Social History     Tobacco Use    Smoking status: Former    Smokeless tobacco: Never    Tobacco comments:     Smoked 20yrs up to 1ppd.  Quit in 2008   Vaping Use    Vaping status: Never Used   Substance and Sexual Activity    Alcohol use: Yes     Comment: Occasional     Drug use: Never    Sexual activity: Not Currently     Partners: Male     Birth control/protection: Post-menopausal        Objective   /82 (BP Location: Left arm, Patient Position: Sitting, Cuff Size: Large)   Pulse 88   Temp 98.4 °F (36.9 °C) (Temporal)   Ht 5' 3\" (1.6 m)   Wt 81.2 kg (179 lb)   SpO2 98%   BMI 31.71 kg/m²      Physical Exam  Constitutional:       General: She is not in acute distress.     Appearance: She is well-developed.   HENT:      Head: Normocephalic and atraumatic.      Right Ear: External ear normal.      Left Ear: External ear normal.   Eyes:      General: No scleral icterus.     " Conjunctiva/sclera: Conjunctivae normal.   Neck:      Trachea: No tracheal deviation.   Cardiovascular:      Rate and Rhythm: Normal rate.   Pulmonary:      Effort: Pulmonary effort is normal.   Abdominal:      Palpations: Abdomen is soft. Mass: no appreciable aortic pulsation/aneurysm.   Musculoskeletal:         General: Normal range of motion.      Cervical back: Normal range of motion and neck supple.   Skin:     General: Skin is warm and dry.   Neurological:      Mental Status: She is alert and oriented to person, place, and time.   Psychiatric:         Mood and Affect: Mood normal.         Behavior: Behavior normal.         Thought Content: Thought content normal.         Judgment: Judgment normal.     Carotid duplex:  CONCLUSION:     Impression  RIGHT:  Widely patent internal carotid artery and endarterectomy site.  Vertebral artery flow is antegrade. There is no significant subclavian artery  disease.     LEFT:  There is <50% stenosis noted in the internal carotid artery. Plaque is  heterogenous and irregular.  Vertebral artery flow is antegrade. There is no significant subclavian artery  disease.     Compared to previous study on 2/2/2024, there is no change.  Recommend repeat testing in 1 year as per protocol unless otherwise indicated    Administrative Statements   I have spent a total time of 30 minutes in caring for this patient on the day of the visit/encounter including Diagnostic results, Prognosis, Risks and benefits of tx options, Instructions for management, Patient and family education, Importance of tx compliance, Risk factor reductions, Impressions, Counseling / Coordination of care, Documenting in the medical record, Reviewing/placing orders in the medical record (including tests, medications, and/or procedures), and Obtaining or reviewing history  .

## (undated) DEVICE — IV CATH 16 G SAFETY

## (undated) DEVICE — THYROID SHEET: Brand: CONVERTORS

## (undated) DEVICE — CAROTID ARTERY SHUNT KIT,RADIOPAQUE LINE, STRAIGHT: Brand: ARGYLE

## (undated) DEVICE — SYRINGE CATH TIP 50ML

## (undated) DEVICE — 40529 DERMAPROX PAD 11'' X 15'' X 1'': Brand: 40529 DERMAPROX PAD 11'' X 15'' X 1''

## (undated) DEVICE — SUT PROLENE 7-0 BV 175-6 24 IN 8735H

## (undated) DEVICE — VESSEL LOOPS X-RAY DETECTABLE: Brand: DEROYAL

## (undated) DEVICE — SUT MONOCRYL 3-0 SH 27 IN Y416H

## (undated) DEVICE — SYRINGE 10ML LL

## (undated) DEVICE — DRAPE PROBE NEO-PROBE/ULTRASOUND

## (undated) DEVICE — BETHL CAROTID ENDARTERECTOMY: Brand: CARDINAL HEALTH

## (undated) DEVICE — PETRI DISH STERILE

## (undated) DEVICE — SUT PROLENE 6-0 BV130 30 IN 8709H

## (undated) DEVICE — SUT SILK 4-0 30 IN A303H

## (undated) DEVICE — 3M™ IOBAN™ 2 ANTIMICROBIAL INCISE DRAPE 6650EZ: Brand: IOBAN™ 2

## (undated) DEVICE — SUT SILK 3-0 30 IN A304H

## (undated) DEVICE — DRAPE EQUIPMENT RF WAND

## (undated) DEVICE — SUT MONOCRYL 4-0 PS-2 27 IN Y426H

## (undated) DEVICE — ADHESIVE SKIN HIGH VISCOSITY EXOFIN 1ML

## (undated) DEVICE — SUT SILK 2-0 30 IN A305H

## (undated) DEVICE — DRAPE ISO IRRIGATION POUCH 1016

## (undated) DEVICE — TRAY FOLEY 16FR  SILICONE W/DRAINAGE BAG SURESTEP

## (undated) DEVICE — LIGACLIP MCA MULTIPLE CLIP APPLIERS, 20 MEDIUM CLIPS: Brand: LIGACLIP

## (undated) DEVICE — SCD SEQUENTIAL COMPRESSION COMFORT SLEEVE MEDIUM KNEE LENGTH: Brand: KENDALL SCD

## (undated) DEVICE — SURGICEL FIBRILLAR 1 X 2

## (undated) DEVICE — LIGACLIP MCA MULTIPLE CLIP APPLIERS, 20 SMALL CLIPS: Brand: LIGACLIP

## (undated) DEVICE — NEEDLE 25G X 1 1/2

## (undated) DEVICE — BAG DECANTER

## (undated) DEVICE — GLOVE INDICATOR PI UNDERGLOVE SZ 8 BLUE

## (undated) DEVICE — PENCIL ELECTROSURG E-Z CLEAN -0035H